# Patient Record
Sex: MALE | Race: WHITE | Employment: OTHER | URBAN - METROPOLITAN AREA
[De-identification: names, ages, dates, MRNs, and addresses within clinical notes are randomized per-mention and may not be internally consistent; named-entity substitution may affect disease eponyms.]

---

## 2017-01-31 ENCOUNTER — PROCEDURE VISIT (OUTPATIENT)
Dept: CARDIOLOGY CLINIC | Age: 57
End: 2017-01-31

## 2017-01-31 ENCOUNTER — OFFICE VISIT (OUTPATIENT)
Dept: CARDIOLOGY CLINIC | Age: 57
End: 2017-01-31

## 2017-01-31 VITALS
SYSTOLIC BLOOD PRESSURE: 98 MMHG | OXYGEN SATURATION: 97 % | WEIGHT: 192.5 LBS | DIASTOLIC BLOOD PRESSURE: 64 MMHG | HEIGHT: 72 IN | HEART RATE: 73 BPM | BODY MASS INDEX: 26.07 KG/M2

## 2017-01-31 DIAGNOSIS — I25.110 CORONARY ARTERY DISEASE INVOLVING NATIVE HEART WITH UNSTABLE ANGINA PECTORIS, UNSPECIFIED VESSEL OR LESION TYPE (HCC): Primary | ICD-10-CM

## 2017-01-31 DIAGNOSIS — E78.00 HYPERCHOLESTEREMIA: ICD-10-CM

## 2017-01-31 DIAGNOSIS — I10 ESSENTIAL HYPERTENSION: ICD-10-CM

## 2017-01-31 DIAGNOSIS — Z72.0 TOBACCO ABUSE: ICD-10-CM

## 2017-01-31 DIAGNOSIS — Z95.810 AUTOMATIC IMPLANTABLE CARDIOVERTER-DEFIBRILLATOR IN SITU: ICD-10-CM

## 2017-01-31 DIAGNOSIS — I25.5 ISCHEMIC CARDIOMYOPATHY: ICD-10-CM

## 2017-01-31 DIAGNOSIS — I42.8 OTHER PRIMARY CARDIOMYOPATHIES: ICD-10-CM

## 2017-01-31 DIAGNOSIS — I51.3 LEFT VENTRICULAR THROMBUS: ICD-10-CM

## 2017-01-31 PROCEDURE — 99214 OFFICE O/P EST MOD 30 MIN: CPT | Performed by: INTERNAL MEDICINE

## 2017-01-31 PROCEDURE — 93283 PRGRMG EVAL IMPLANTABLE DFB: CPT | Performed by: INTERNAL MEDICINE

## 2017-01-31 RX ORDER — CLOPIDOGREL BISULFATE 75 MG/1
75 TABLET ORAL DAILY
Qty: 90 TABLET | Refills: 3 | Status: SHIPPED | OUTPATIENT
Start: 2017-01-31 | End: 2018-03-02 | Stop reason: SDUPTHER

## 2017-01-31 RX ORDER — CARVEDILOL 12.5 MG/1
12.5 TABLET ORAL 2 TIMES DAILY WITH MEALS
Qty: 180 TABLET | Refills: 3 | Status: SHIPPED | OUTPATIENT
Start: 2017-01-31 | End: 2018-03-07 | Stop reason: SDUPTHER

## 2017-01-31 RX ORDER — ATORVASTATIN CALCIUM 80 MG/1
80 TABLET, FILM COATED ORAL DAILY
Qty: 90 TABLET | Refills: 3 | Status: SHIPPED | OUTPATIENT
Start: 2017-01-31 | End: 2018-03-02 | Stop reason: SDUPTHER

## 2017-01-31 RX ORDER — NICOTINE 21 MG/24HR
1 PATCH, TRANSDERMAL 24 HOURS TRANSDERMAL DAILY
Qty: 90 PATCH | Refills: 3 | Status: SHIPPED | OUTPATIENT
Start: 2017-01-31 | End: 2019-11-05

## 2017-04-29 PROBLEM — F14.10 COCAINE ABUSE (HCC): Status: ACTIVE | Noted: 2017-04-29

## 2017-04-29 PROBLEM — R07.9 CHEST PAIN: Status: ACTIVE | Noted: 2017-04-29

## 2017-06-15 ENCOUNTER — TELEPHONE (OUTPATIENT)
Dept: CARDIOLOGY CLINIC | Age: 57
End: 2017-06-15

## 2017-06-15 ASSESSMENT — ENCOUNTER SYMPTOMS: SHORTNESS OF BREATH: 0

## 2017-06-15 ASSESSMENT — COPD QUESTIONNAIRES: CAT_SEVERITY: MODERATE

## 2017-06-21 ENCOUNTER — TELEPHONE (OUTPATIENT)
Dept: CARDIOLOGY CLINIC | Age: 57
End: 2017-06-21

## 2017-06-22 ENCOUNTER — TELEPHONE (OUTPATIENT)
Dept: CARDIOLOGY CLINIC | Age: 57
End: 2017-06-22

## 2017-06-27 ENCOUNTER — HOSPITAL ENCOUNTER (OUTPATIENT)
Dept: ENDOSCOPY | Age: 57
Discharge: HOME | End: 2017-06-27
Attending: INTERNAL MEDICINE | Admitting: INTERNAL MEDICINE

## 2017-06-27 VITALS
HEART RATE: 55 BPM | DIASTOLIC BLOOD PRESSURE: 80 MMHG | SYSTOLIC BLOOD PRESSURE: 105 MMHG | OXYGEN SATURATION: 95 % | WEIGHT: 202.1 LBS | TEMPERATURE: 97.2 F | BODY MASS INDEX: 27.37 KG/M2 | RESPIRATION RATE: 13 BRPM | HEIGHT: 72 IN

## 2017-06-27 RX ORDER — PANTOPRAZOLE SODIUM 40 MG/1
40 TABLET, DELAYED RELEASE ORAL DAILY
Qty: 60 TABLET | Refills: 3 | Status: SHIPPED | OUTPATIENT
Start: 2017-06-27 | End: 2018-03-07 | Stop reason: SDUPTHER

## 2017-06-27 RX ORDER — SODIUM CHLORIDE 9 MG/ML
INJECTION, SOLUTION INTRAVENOUS CONTINUOUS
Status: DISCONTINUED | OUTPATIENT
Start: 2017-06-27 | End: 2017-06-28 | Stop reason: HOSPADM

## 2017-06-27 RX ADMIN — SODIUM CHLORIDE: 9 INJECTION, SOLUTION INTRAVENOUS at 08:03

## 2017-06-27 ASSESSMENT — PAIN - FUNCTIONAL ASSESSMENT: PAIN_FUNCTIONAL_ASSESSMENT: 0-10

## 2017-06-27 ASSESSMENT — ENCOUNTER SYMPTOMS: DYSPNEA ACTIVITY LEVEL: AFTER AMBULATING 2 FLIGHTS OF STAIRS

## 2017-09-07 ENCOUNTER — TELEPHONE (OUTPATIENT)
Dept: CARDIOLOGY CLINIC | Age: 57
End: 2017-09-07

## 2017-09-07 RX ORDER — SILDENAFIL 50 MG/1
50 TABLET, FILM COATED ORAL PRN
Qty: 10 TABLET | Refills: 0 | Status: SHIPPED | OUTPATIENT
Start: 2017-09-07 | End: 2018-03-07 | Stop reason: DRUGHIGH

## 2017-10-30 PROCEDURE — 93296 REM INTERROG EVL PM/IDS: CPT | Performed by: INTERNAL MEDICINE

## 2017-10-30 PROCEDURE — 93295 DEV INTERROG REMOTE 1/2/MLT: CPT | Performed by: INTERNAL MEDICINE

## 2017-10-31 ENCOUNTER — NURSE ONLY (OUTPATIENT)
Dept: CARDIOLOGY CLINIC | Age: 57
End: 2017-10-31

## 2017-10-31 DIAGNOSIS — I25.5 ISCHEMIC CARDIOMYOPATHY: ICD-10-CM

## 2017-10-31 DIAGNOSIS — Z95.810 AUTOMATIC IMPLANTABLE CARDIOVERTER-DEFIBRILLATOR IN SITU: ICD-10-CM

## 2017-10-31 NOTE — LETTER
[address] [place] [phone]  [address] [facility]    Pacemaker/Defibrillator Clinic          10/30/17        Mamta Rolle  [address] [address]        Dear Mamta Rolle    This letter is to inform you that we received the transmission from your monitor at home that checks your pacemaker and/or defibrillator, or implanted heart monitor. Everything is within normal limits. The next date your monitor will automatically transmit will be 2-6-18. Please do not send additional routine transmissions unless specifically requested. Your device and monitor are wireless and most transmit cellularly, but please periodically check your monitor is still plugged in to the electrical outlet. If you still use the telephone land line to send please ensure the connection to the phone estela is secure. This will help to ensure successful automatic transmissions in the future. Also, the monitor needs to be close to you while sleeping at night. Please be aware that the remote device transmission sites are periodically monitored only during regular business hours during which simultaneous in-office device clinics are being run. If your transmission requires attention, we will contact you as soon as possible. Thank you.             Candace

## 2018-03-02 ENCOUNTER — TELEPHONE (OUTPATIENT)
Dept: CARDIOLOGY CLINIC | Age: 58
End: 2018-03-02

## 2018-03-02 DIAGNOSIS — I10 ESSENTIAL HYPERTENSION: ICD-10-CM

## 2018-03-02 DIAGNOSIS — E78.00 HYPERCHOLESTEREMIA: ICD-10-CM

## 2018-03-02 DIAGNOSIS — I25.5 ISCHEMIC CARDIOMYOPATHY: ICD-10-CM

## 2018-03-02 DIAGNOSIS — I25.110 CORONARY ARTERY DISEASE INVOLVING NATIVE HEART WITH UNSTABLE ANGINA PECTORIS, UNSPECIFIED VESSEL OR LESION TYPE (HCC): ICD-10-CM

## 2018-03-02 DIAGNOSIS — Z72.0 TOBACCO ABUSE: ICD-10-CM

## 2018-03-02 RX ORDER — CLOPIDOGREL BISULFATE 75 MG/1
75 TABLET ORAL DAILY
Qty: 90 TABLET | Refills: 0 | Status: SHIPPED | OUTPATIENT
Start: 2018-03-02 | End: 2018-03-07 | Stop reason: SDUPTHER

## 2018-03-02 RX ORDER — ATORVASTATIN CALCIUM 80 MG/1
80 TABLET, FILM COATED ORAL DAILY
Qty: 90 TABLET | Refills: 0 | Status: SHIPPED | OUTPATIENT
Start: 2018-03-02 | End: 2018-03-07 | Stop reason: SDUPTHER

## 2018-03-07 ENCOUNTER — OFFICE VISIT (OUTPATIENT)
Dept: CARDIOLOGY CLINIC | Age: 58
End: 2018-03-07

## 2018-03-07 VITALS
WEIGHT: 209.3 LBS | DIASTOLIC BLOOD PRESSURE: 82 MMHG | SYSTOLIC BLOOD PRESSURE: 134 MMHG | HEIGHT: 72 IN | BODY MASS INDEX: 28.35 KG/M2 | HEART RATE: 74 BPM

## 2018-03-07 DIAGNOSIS — I10 ESSENTIAL HYPERTENSION: ICD-10-CM

## 2018-03-07 DIAGNOSIS — I25.5 ISCHEMIC CARDIOMYOPATHY: ICD-10-CM

## 2018-03-07 DIAGNOSIS — E78.00 HYPERCHOLESTEREMIA: ICD-10-CM

## 2018-03-07 DIAGNOSIS — Z72.0 TOBACCO ABUSE: ICD-10-CM

## 2018-03-07 DIAGNOSIS — I25.110 CORONARY ARTERY DISEASE INVOLVING NATIVE HEART WITH UNSTABLE ANGINA PECTORIS, UNSPECIFIED VESSEL OR LESION TYPE (HCC): Primary | ICD-10-CM

## 2018-03-07 PROCEDURE — 99214 OFFICE O/P EST MOD 30 MIN: CPT | Performed by: NURSE PRACTITIONER

## 2018-03-07 RX ORDER — SILDENAFIL 50 MG/1
50 TABLET, FILM COATED ORAL PRN
Qty: 10 TABLET | Refills: 0 | Status: CANCELLED | OUTPATIENT
Start: 2018-03-07

## 2018-03-07 RX ORDER — SILDENAFIL 100 MG/1
100 TABLET, FILM COATED ORAL PRN
Qty: 30 TABLET | Refills: 3 | Status: SHIPPED | OUTPATIENT
Start: 2018-03-07 | End: 2019-03-12 | Stop reason: SDUPTHER

## 2018-03-07 RX ORDER — ATORVASTATIN CALCIUM 80 MG/1
80 TABLET, FILM COATED ORAL DAILY
Qty: 90 TABLET | Refills: 0 | Status: SHIPPED | OUTPATIENT
Start: 2018-03-07 | End: 2018-12-07 | Stop reason: SDUPTHER

## 2018-03-07 RX ORDER — CLOPIDOGREL BISULFATE 75 MG/1
75 TABLET ORAL DAILY
Qty: 90 TABLET | Refills: 0 | Status: SHIPPED | OUTPATIENT
Start: 2018-03-07 | End: 2019-03-23 | Stop reason: SDUPTHER

## 2018-03-07 RX ORDER — ASPIRIN 81 MG/1
81 TABLET ORAL DAILY
Qty: 30 TABLET | Refills: 11 | Status: SHIPPED | OUTPATIENT
Start: 2018-03-07 | End: 2019-03-23 | Stop reason: SDUPTHER

## 2018-03-07 RX ORDER — CARVEDILOL 12.5 MG/1
12.5 TABLET ORAL 2 TIMES DAILY WITH MEALS
Qty: 180 TABLET | Refills: 3 | Status: SHIPPED | OUTPATIENT
Start: 2018-03-07 | End: 2019-07-05 | Stop reason: SDUPTHER

## 2018-03-07 RX ORDER — PANTOPRAZOLE SODIUM 40 MG/1
40 TABLET, DELAYED RELEASE ORAL DAILY
Qty: 60 TABLET | Refills: 3 | Status: SHIPPED | OUTPATIENT
Start: 2018-03-07 | End: 2019-03-23 | Stop reason: SDUPTHER

## 2018-04-04 ENCOUNTER — OFFICE VISIT (OUTPATIENT)
Dept: CARDIOLOGY CLINIC | Age: 58
End: 2018-04-04

## 2018-04-04 ENCOUNTER — PROCEDURE VISIT (OUTPATIENT)
Dept: CARDIOLOGY CLINIC | Age: 58
End: 2018-04-04

## 2018-04-04 VITALS
OXYGEN SATURATION: 97 % | WEIGHT: 210.9 LBS | HEART RATE: 79 BPM | DIASTOLIC BLOOD PRESSURE: 80 MMHG | SYSTOLIC BLOOD PRESSURE: 106 MMHG | BODY MASS INDEX: 28.56 KG/M2 | HEIGHT: 72 IN

## 2018-04-04 DIAGNOSIS — I25.5 ISCHEMIC CARDIOMYOPATHY: ICD-10-CM

## 2018-04-04 DIAGNOSIS — Z95.810 AUTOMATIC IMPLANTABLE CARDIOVERTER-DEFIBRILLATOR IN SITU: ICD-10-CM

## 2018-04-04 DIAGNOSIS — I25.110 CORONARY ARTERY DISEASE INVOLVING NATIVE HEART WITH UNSTABLE ANGINA PECTORIS, UNSPECIFIED VESSEL OR LESION TYPE (HCC): Primary | Chronic | ICD-10-CM

## 2018-04-04 DIAGNOSIS — I25.119 CORONARY ARTERY DISEASE WITH ANGINA PECTORIS, UNSPECIFIED VESSEL OR LESION TYPE, UNSPECIFIED WHETHER NATIVE OR TRANSPLANTED HEART (HCC): Chronic | ICD-10-CM

## 2018-04-04 PROCEDURE — 93283 PRGRMG EVAL IMPLANTABLE DFB: CPT | Performed by: INTERNAL MEDICINE

## 2018-04-04 PROCEDURE — 99214 OFFICE O/P EST MOD 30 MIN: CPT | Performed by: INTERNAL MEDICINE

## 2018-04-16 ENCOUNTER — TELEPHONE (OUTPATIENT)
Dept: CARDIOLOGY CLINIC | Age: 58
End: 2018-04-16

## 2018-07-10 ENCOUNTER — NURSE ONLY (OUTPATIENT)
Dept: CARDIOLOGY CLINIC | Age: 58
End: 2018-07-10

## 2018-07-10 DIAGNOSIS — I42.9 PRIMARY CARDIOMYOPATHY (HCC): ICD-10-CM

## 2018-07-10 DIAGNOSIS — Z95.810 AUTOMATIC IMPLANTABLE CARDIOVERTER-DEFIBRILLATOR IN SITU: ICD-10-CM

## 2018-07-10 PROCEDURE — 93296 REM INTERROG EVL PM/IDS: CPT | Performed by: INTERNAL MEDICINE

## 2018-07-10 PROCEDURE — 93295 DEV INTERROG REMOTE 1/2/MLT: CPT | Performed by: INTERNAL MEDICINE

## 2018-07-24 ENCOUNTER — HOSPITAL ENCOUNTER (OUTPATIENT)
Dept: NON INVASIVE DIAGNOSTICS | Age: 58
Discharge: HOME | End: 2018-07-24
Attending: INTERNAL MEDICINE | Admitting: INTERNAL MEDICINE

## 2018-07-24 DIAGNOSIS — R07.89 OTHER CHEST PAIN: ICD-10-CM

## 2018-07-24 LAB
LV EF: 28 %
LVEF MODALITY: NORMAL

## 2018-07-28 DIAGNOSIS — E78.00 HYPERCHOLESTEREMIA: ICD-10-CM

## 2018-07-28 DIAGNOSIS — Z72.0 TOBACCO ABUSE: ICD-10-CM

## 2018-07-28 DIAGNOSIS — I25.5 ISCHEMIC CARDIOMYOPATHY: ICD-10-CM

## 2018-07-28 DIAGNOSIS — I10 ESSENTIAL HYPERTENSION: ICD-10-CM

## 2018-07-28 DIAGNOSIS — I25.110 CORONARY ARTERY DISEASE INVOLVING NATIVE HEART WITH UNSTABLE ANGINA PECTORIS, UNSPECIFIED VESSEL OR LESION TYPE (HCC): ICD-10-CM

## 2018-07-31 ENCOUNTER — OFFICE VISIT (OUTPATIENT)
Dept: CARDIOLOGY CLINIC | Age: 58
End: 2018-07-31

## 2018-07-31 VITALS
DIASTOLIC BLOOD PRESSURE: 72 MMHG | WEIGHT: 213 LBS | HEART RATE: 72 BPM | SYSTOLIC BLOOD PRESSURE: 110 MMHG | BODY MASS INDEX: 28.89 KG/M2 | OXYGEN SATURATION: 96 %

## 2018-07-31 DIAGNOSIS — I10 ESSENTIAL HYPERTENSION: Chronic | ICD-10-CM

## 2018-07-31 DIAGNOSIS — I25.110 CORONARY ARTERY DISEASE INVOLVING NATIVE HEART WITH UNSTABLE ANGINA PECTORIS, UNSPECIFIED VESSEL OR LESION TYPE (HCC): Chronic | ICD-10-CM

## 2018-07-31 DIAGNOSIS — Z72.0 TOBACCO ABUSE: Chronic | ICD-10-CM

## 2018-07-31 DIAGNOSIS — I42.9 PRIMARY CARDIOMYOPATHY (HCC): Primary | ICD-10-CM

## 2018-07-31 DIAGNOSIS — E78.2 MIXED HYPERLIPIDEMIA: ICD-10-CM

## 2018-07-31 PROCEDURE — 99214 OFFICE O/P EST MOD 30 MIN: CPT | Performed by: NURSE PRACTITIONER

## 2018-07-31 RX ORDER — LISINOPRIL 2.5 MG/1
2.5 TABLET ORAL DAILY
Qty: 30 TABLET | Refills: 3 | Status: SHIPPED | OUTPATIENT
Start: 2018-07-31 | End: 2018-12-07 | Stop reason: SDUPTHER

## 2018-08-01 RX ORDER — ATORVASTATIN CALCIUM 80 MG/1
80 TABLET, FILM COATED ORAL DAILY
Qty: 90 TABLET | Refills: 3 | Status: SHIPPED | OUTPATIENT
Start: 2018-08-01 | End: 2018-12-06 | Stop reason: SDUPTHER

## 2018-08-01 RX ORDER — CLOPIDOGREL BISULFATE 75 MG/1
75 TABLET ORAL DAILY
Qty: 90 TABLET | Refills: 3 | Status: SHIPPED | OUTPATIENT
Start: 2018-08-01 | End: 2019-03-25 | Stop reason: ALTCHOICE

## 2018-11-01 ENCOUNTER — OFFICE VISIT (OUTPATIENT)
Dept: CARDIOLOGY CLINIC | Age: 58
End: 2018-11-01
Payer: COMMERCIAL

## 2018-11-01 VITALS
BODY MASS INDEX: 28.58 KG/M2 | WEIGHT: 211 LBS | HEART RATE: 68 BPM | SYSTOLIC BLOOD PRESSURE: 102 MMHG | HEIGHT: 72 IN | DIASTOLIC BLOOD PRESSURE: 60 MMHG

## 2018-11-01 DIAGNOSIS — I25.5 ISCHEMIC CARDIOMYOPATHY: ICD-10-CM

## 2018-11-01 DIAGNOSIS — E78.2 MIXED HYPERLIPIDEMIA: ICD-10-CM

## 2018-11-01 DIAGNOSIS — I25.10 CORONARY ARTERY DISEASE INVOLVING NATIVE CORONARY ARTERY OF NATIVE HEART WITHOUT ANGINA PECTORIS: Primary | Chronic | ICD-10-CM

## 2018-11-01 DIAGNOSIS — Z72.0 TOBACCO ABUSE: Chronic | ICD-10-CM

## 2018-11-01 DIAGNOSIS — I10 ESSENTIAL HYPERTENSION: Chronic | ICD-10-CM

## 2018-11-01 PROCEDURE — 99214 OFFICE O/P EST MOD 30 MIN: CPT | Performed by: NURSE PRACTITIONER

## 2018-11-01 RX ORDER — AMOXICILLIN AND CLAVULANATE POTASSIUM 875; 125 MG/1; MG/1
TABLET, FILM COATED ORAL
Refills: 0 | COMMUNITY
Start: 2018-10-28 | End: 2019-11-05

## 2018-11-01 NOTE — PROGRESS NOTES
fixed anterior and fixed inferior lateral defects  of severe intensity c/w scar. There is no ischemia. There is severe LV dysfunction with EF=28%. This is similar to study in 2013. Procedure: 11/14  [facility]  iFR RCA - 0.96        Coronary Findings   Vessel Ostial Proximal Mid Distal Special   LM 0% 0% 0% 0%     LAD 0% 0% 0% 0% D1-100% with L-L karey   RI   50% sup br         Cx 0% 0% 0% 0% Widely patent stent   RCA 0% 0% 40%, 60% 0% iFR negative      ECHO: 11/16  Left Ventricle   Normal left ventricular size. Mild concentric left ventricular hypertrophy.   Decreased left ventricular systolic function with mid to distal   inferolateral and apical hypokinesis. Estimated EF 40%. No evidence of an   apical clot. Definity confirmed. Assessment:   ~ Coronary artery disease/ cardiomyopathy     S/p CABG, AICD 4/13 11/16 4/13 CX stent patent, D1 100%, LIMA- OM patient but occluded to LAD, ramus severe, RCA 40-60%  Cx stent patent, D1 100%, LIMA-OM patent but occluded to LAD, ramus severe      Last Myoview stress test: 7/224/18: EF 28%, showed no ischemia     Last ECHO: 11/16: EF 40%, mild MR, TR     On    ASA, Plavix, Coreg, Lisinopril, and Lipitor    Patient appears stable on physical , No signs of CHF    Plan: continue current medications     ~ LV thrombus     Last ECHO: 11/16- showed no thrombus, on Plavix and ASA    ~ Hypertension     Today's B/P- 102/60     Stable, continue current medications    ~ Hyperlipidemia     Continue Lipitor 80 mg daily    6/28/18: HDL: 42, LDL: 91    ~ Tobacco    Smoking cessation was discussed at today's office visit    Patient is stable since last office visit. Plan:   1. Continue current medications  2. Lab slip for BMP- today   3. Smoking cessation was discussed at today's visit  4. Follow up in six months    I have addressed the patient's cardiac risk factors and adjusted pharmacologic treatment as needed.  In addition, I have reinforced the need for patient directed risk

## 2018-12-06 DIAGNOSIS — Z72.0 TOBACCO ABUSE: ICD-10-CM

## 2018-12-06 DIAGNOSIS — I25.110 CORONARY ARTERY DISEASE INVOLVING NATIVE HEART WITH UNSTABLE ANGINA PECTORIS, UNSPECIFIED VESSEL OR LESION TYPE (HCC): ICD-10-CM

## 2018-12-06 DIAGNOSIS — I10 ESSENTIAL HYPERTENSION: ICD-10-CM

## 2018-12-06 DIAGNOSIS — E78.00 HYPERCHOLESTEREMIA: ICD-10-CM

## 2018-12-06 DIAGNOSIS — I25.5 ISCHEMIC CARDIOMYOPATHY: ICD-10-CM

## 2018-12-07 RX ORDER — ATORVASTATIN CALCIUM 80 MG/1
80 TABLET, FILM COATED ORAL DAILY
Qty: 90 TABLET | Refills: 3 | Status: SHIPPED | OUTPATIENT
Start: 2018-12-07

## 2018-12-07 RX ORDER — LISINOPRIL 2.5 MG/1
2.5 TABLET ORAL DAILY
Qty: 90 TABLET | Refills: 3 | Status: SHIPPED | OUTPATIENT
Start: 2018-12-07 | End: 2019-11-05

## 2019-02-11 ENCOUNTER — HOSPITAL ENCOUNTER (OUTPATIENT)
Dept: GENERAL RADIOLOGY | Age: 59
Discharge: HOME | End: 2019-02-11
Payer: COMMERCIAL

## 2019-02-11 ENCOUNTER — HOSPITAL ENCOUNTER (OUTPATIENT)
Age: 59
Discharge: HOME | End: 2019-02-11
Payer: COMMERCIAL

## 2019-02-11 DIAGNOSIS — R52 PAIN: ICD-10-CM

## 2019-02-11 PROCEDURE — 73030 X-RAY EXAM OF SHOULDER: CPT

## 2019-03-12 ENCOUNTER — TELEPHONE (OUTPATIENT)
Dept: CARDIOLOGY CLINIC | Age: 59
End: 2019-03-12

## 2019-03-12 RX ORDER — SILDENAFIL 100 MG/1
100 TABLET, FILM COATED ORAL PRN
Qty: 30 TABLET | Refills: 3 | Status: ON HOLD | OUTPATIENT
Start: 2019-03-12 | End: 2020-01-09 | Stop reason: HOSPADM

## 2019-03-23 DIAGNOSIS — E78.00 HYPERCHOLESTEREMIA: ICD-10-CM

## 2019-03-23 DIAGNOSIS — I25.5 ISCHEMIC CARDIOMYOPATHY: ICD-10-CM

## 2019-03-23 DIAGNOSIS — I25.110 CORONARY ARTERY DISEASE INVOLVING NATIVE HEART WITH UNSTABLE ANGINA PECTORIS, UNSPECIFIED VESSEL OR LESION TYPE (HCC): ICD-10-CM

## 2019-03-23 DIAGNOSIS — Z72.0 TOBACCO ABUSE: ICD-10-CM

## 2019-03-23 DIAGNOSIS — I10 ESSENTIAL HYPERTENSION: ICD-10-CM

## 2019-03-25 RX ORDER — ASPIRIN 81 MG/1
TABLET, COATED ORAL
Qty: 30 TABLET | Refills: 0 | Status: SHIPPED | OUTPATIENT
Start: 2019-03-25

## 2019-03-25 RX ORDER — CLOPIDOGREL BISULFATE 75 MG/1
75 TABLET ORAL DAILY
Qty: 90 TABLET | Refills: 0 | Status: ON HOLD | OUTPATIENT
Start: 2019-03-25 | End: 2019-11-20 | Stop reason: ALTCHOICE

## 2019-03-25 RX ORDER — PANTOPRAZOLE SODIUM 40 MG/1
40 TABLET, DELAYED RELEASE ORAL DAILY
Qty: 60 TABLET | Refills: 0 | Status: SHIPPED | OUTPATIENT
Start: 2019-03-25 | End: 2019-06-06 | Stop reason: SDUPTHER

## 2019-06-07 RX ORDER — PANTOPRAZOLE SODIUM 40 MG/1
40 TABLET, DELAYED RELEASE ORAL DAILY
Qty: 60 TABLET | Refills: 0 | Status: SHIPPED | OUTPATIENT
Start: 2019-06-07

## 2019-07-05 DIAGNOSIS — I10 ESSENTIAL HYPERTENSION: ICD-10-CM

## 2019-07-05 DIAGNOSIS — I25.110 CORONARY ARTERY DISEASE INVOLVING NATIVE HEART WITH UNSTABLE ANGINA PECTORIS, UNSPECIFIED VESSEL OR LESION TYPE (HCC): ICD-10-CM

## 2019-07-05 DIAGNOSIS — Z72.0 TOBACCO ABUSE: ICD-10-CM

## 2019-07-05 DIAGNOSIS — I25.5 ISCHEMIC CARDIOMYOPATHY: ICD-10-CM

## 2019-07-05 DIAGNOSIS — E78.00 HYPERCHOLESTEREMIA: ICD-10-CM

## 2019-07-05 RX ORDER — CARVEDILOL 12.5 MG/1
TABLET ORAL
Qty: 180 TABLET | Refills: 0 | Status: ON HOLD | OUTPATIENT
Start: 2019-07-05 | End: 2020-01-21 | Stop reason: HOSPADM

## 2019-09-30 ENCOUNTER — TELEPHONE (OUTPATIENT)
Dept: CARDIOLOGY CLINIC | Age: 59
End: 2019-09-30

## 2019-11-05 RX ORDER — LANOLIN ALCOHOL/MO/W.PET/CERES
1000 CREAM (GRAM) TOPICAL DAILY
Status: ON HOLD | COMMUNITY
End: 2020-01-21 | Stop reason: HOSPADM

## 2019-11-05 RX ORDER — OMEPRAZOLE 20 MG/1
20 CAPSULE, DELAYED RELEASE ORAL NIGHTLY
Status: ON HOLD | COMMUNITY
End: 2020-01-03

## 2019-11-06 ENCOUNTER — ANESTHESIA EVENT (OUTPATIENT)
Dept: ENDOSCOPY | Age: 59
End: 2019-11-06
Payer: COMMERCIAL

## 2019-11-20 ENCOUNTER — ANESTHESIA (OUTPATIENT)
Dept: ENDOSCOPY | Age: 59
End: 2019-11-20
Payer: COMMERCIAL

## 2019-11-20 ENCOUNTER — HOSPITAL ENCOUNTER (OUTPATIENT)
Age: 59
Setting detail: OUTPATIENT SURGERY
Discharge: HOME | End: 2019-11-20
Attending: INTERNAL MEDICINE | Admitting: INTERNAL MEDICINE
Payer: COMMERCIAL

## 2019-11-20 VITALS
BODY MASS INDEX: 29.12 KG/M2 | HEIGHT: 72 IN | DIASTOLIC BLOOD PRESSURE: 82 MMHG | TEMPERATURE: 96.9 F | RESPIRATION RATE: 16 BRPM | OXYGEN SATURATION: 99 % | WEIGHT: 215 LBS | HEART RATE: 69 BPM | SYSTOLIC BLOOD PRESSURE: 132 MMHG

## 2019-11-20 VITALS — OXYGEN SATURATION: 100 % | DIASTOLIC BLOOD PRESSURE: 73 MMHG | SYSTOLIC BLOOD PRESSURE: 108 MMHG

## 2019-11-20 PROCEDURE — 2500000003 HC RX 250 WO HCPCS: Performed by: INTERNAL MEDICINE

## 2019-11-20 PROCEDURE — 3700000000 HC ANESTHESIA ATTENDED CARE: Performed by: INTERNAL MEDICINE

## 2019-11-20 PROCEDURE — 3700000001 HC ADD 15 MINUTES (ANESTHESIA): Performed by: INTERNAL MEDICINE

## 2019-11-20 PROCEDURE — 7100000011 HC PHASE II RECOVERY - ADDTL 15 MIN: Performed by: INTERNAL MEDICINE

## 2019-11-20 PROCEDURE — 6360000002 HC RX W HCPCS: Performed by: NURSE ANESTHETIST, CERTIFIED REGISTERED

## 2019-11-20 PROCEDURE — 6370000000 HC RX 637 (ALT 250 FOR IP): Performed by: INTERNAL MEDICINE

## 2019-11-20 PROCEDURE — 2500000003 HC RX 250 WO HCPCS: Performed by: NURSE ANESTHETIST, CERTIFIED REGISTERED

## 2019-11-20 PROCEDURE — 2709999900 HC NON-CHARGEABLE SUPPLY: Performed by: INTERNAL MEDICINE

## 2019-11-20 PROCEDURE — 2580000003 HC RX 258: Performed by: ANESTHESIOLOGY

## 2019-11-20 PROCEDURE — 3609010600 HC COLONOSCOPY POLYPECTOMY SNARE/COLD BIOPSY: Performed by: INTERNAL MEDICINE

## 2019-11-20 PROCEDURE — 7100000010 HC PHASE II RECOVERY - FIRST 15 MIN: Performed by: INTERNAL MEDICINE

## 2019-11-20 RX ORDER — PROPOFOL 10 MG/ML
INJECTION, EMULSION INTRAVENOUS CONTINUOUS PRN
Status: DISCONTINUED | OUTPATIENT
Start: 2019-11-20 | End: 2019-11-20 | Stop reason: SDUPTHER

## 2019-11-20 RX ORDER — PROPOFOL 10 MG/ML
INJECTION, EMULSION INTRAVENOUS PRN
Status: DISCONTINUED | OUTPATIENT
Start: 2019-11-20 | End: 2019-11-20 | Stop reason: SDUPTHER

## 2019-11-20 RX ORDER — SODIUM CHLORIDE 9 MG/ML
INJECTION, SOLUTION INTRAVENOUS CONTINUOUS
Status: DISCONTINUED | OUTPATIENT
Start: 2019-11-20 | End: 2019-11-20 | Stop reason: HOSPADM

## 2019-11-20 RX ORDER — LIDOCAINE HYDROCHLORIDE 20 MG/ML
INJECTION, SOLUTION INFILTRATION; PERINEURAL PRN
Status: DISCONTINUED | OUTPATIENT
Start: 2019-11-20 | End: 2019-11-20 | Stop reason: SDUPTHER

## 2019-11-20 RX ADMIN — PROPOFOL 40 MG: 10 INJECTION, EMULSION INTRAVENOUS at 11:32

## 2019-11-20 RX ADMIN — PROPOFOL 30 MG: 10 INJECTION, EMULSION INTRAVENOUS at 11:45

## 2019-11-20 RX ADMIN — LIDOCAINE HYDROCHLORIDE 100 MG: 20 INJECTION, SOLUTION INFILTRATION; PERINEURAL at 11:32

## 2019-11-20 RX ADMIN — PROPOFOL 20 MG: 10 INJECTION, EMULSION INTRAVENOUS at 11:38

## 2019-11-20 RX ADMIN — PROPOFOL 40 MG: 10 INJECTION, EMULSION INTRAVENOUS at 11:35

## 2019-11-20 RX ADMIN — PROPOFOL 140 MCG/KG/MIN: 10 INJECTION, EMULSION INTRAVENOUS at 11:32

## 2019-11-20 RX ADMIN — SODIUM CHLORIDE: 9 INJECTION, SOLUTION INTRAVENOUS at 11:17

## 2019-11-20 RX ADMIN — PROPOFOL 20 MG: 10 INJECTION, EMULSION INTRAVENOUS at 11:48

## 2019-11-20 ASSESSMENT — PAIN - FUNCTIONAL ASSESSMENT: PAIN_FUNCTIONAL_ASSESSMENT: 0-10

## 2019-11-20 ASSESSMENT — PAIN SCALES - GENERAL: PAINLEVEL_OUTOF10: 0

## 2019-11-20 NOTE — TELEPHONE ENCOUNTER
Medication Refill      PLEASE NOTE PT HAS APPT [facility]PK[facility] SCHEDULED FOR 3/7/18   THESE SCRIPTS WERE REFUSED ON 2/27/18 SO PT CALLED IN TO SCHED. When was your last appointment with cardiology?  (if 1year or longer, please schedule an appointment)    Medication needing refilled:clopidogrel (PLAVIX) 75 MG tablet AND  atorvastatin (LIPITOR) 80 MG tablet      Doseage of the medication    How are you taking this medication (QD, BID, TID, QID, PRN):    Patient want a 30 or 90 day supply called in:30    Which Pharmacy are we sending the medication [facility]: [facility] [address] [facility] [address]    Pharmacy Phone number:    Pharmacy Fax number: PAST SURGICAL HISTORY:  Cataract extraction status of eye, left     H/O knee surgery torn minsicus repair b/l knee    H/O:

## 2020-01-03 ENCOUNTER — APPOINTMENT (OUTPATIENT)
Dept: CT IMAGING | Age: 60
DRG: 065 | End: 2020-01-03
Payer: COMMERCIAL

## 2020-01-03 ENCOUNTER — APPOINTMENT (OUTPATIENT)
Dept: GENERAL RADIOLOGY | Age: 60
DRG: 065 | End: 2020-01-03
Payer: COMMERCIAL

## 2020-01-03 ENCOUNTER — HOSPITAL ENCOUNTER (INPATIENT)
Age: 60
LOS: 6 days | Discharge: NURSING, REHAB OR LONG-TERM CARE | DRG: 065 | End: 2020-01-09
Attending: EMERGENCY MEDICINE | Admitting: INTERNAL MEDICINE
Payer: COMMERCIAL

## 2020-01-03 PROBLEM — I63.9 ACUTE CEREBROVASCULAR ACCIDENT (CVA) (HCC): Status: ACTIVE | Noted: 2020-01-03

## 2020-01-03 LAB
A/G RATIO: 1.1 (ref 1.1–2.2)
ALBUMIN SERPL-MCNC: 3.8 G/DL (ref 3.4–5)
ALP BLD-CCNC: 82 U/L (ref 40–129)
ALT SERPL-CCNC: 14 U/L (ref 10–40)
ANION GAP SERPL CALCULATED.3IONS-SCNC: 12 MMOL/L (ref 3–16)
AST SERPL-CCNC: 16 U/L (ref 15–37)
BASOPHILS ABSOLUTE: 0.1 K/UL (ref 0–0.2)
BASOPHILS RELATIVE PERCENT: 0.8 %
BILIRUB SERPL-MCNC: 0.7 MG/DL (ref 0–1)
BUN BLDV-MCNC: 17 MG/DL (ref 7–20)
CALCIUM SERPL-MCNC: 9.6 MG/DL (ref 8.3–10.6)
CHLORIDE BLD-SCNC: 102 MMOL/L (ref 99–110)
CO2: 28 MMOL/L (ref 21–32)
CREAT SERPL-MCNC: 1.2 MG/DL (ref 0.8–1.3)
EOSINOPHILS ABSOLUTE: 0.1 K/UL (ref 0–0.6)
EOSINOPHILS RELATIVE PERCENT: 0.7 %
GFR AFRICAN AMERICAN: >60
GFR NON-AFRICAN AMERICAN: >60
GLOBULIN: 3.6 G/DL
GLUCOSE BLD-MCNC: 106 MG/DL (ref 70–99)
GLUCOSE BLD-MCNC: 118 MG/DL (ref 70–99)
HCT VFR BLD CALC: 46.5 % (ref 40.5–52.5)
HEMOGLOBIN: 15.2 G/DL (ref 13.5–17.5)
INR BLD: 0.92 (ref 0.86–1.14)
LYMPHOCYTES ABSOLUTE: 1.6 K/UL (ref 1–5.1)
LYMPHOCYTES RELATIVE PERCENT: 13.3 %
MCH RBC QN AUTO: 30.9 PG (ref 26–34)
MCHC RBC AUTO-ENTMCNC: 32.6 G/DL (ref 31–36)
MCV RBC AUTO: 95 FL (ref 80–100)
MONOCYTES ABSOLUTE: 0.8 K/UL (ref 0–1.3)
MONOCYTES RELATIVE PERCENT: 6.5 %
NEUTROPHILS ABSOLUTE: 9.2 K/UL (ref 1.7–7.7)
NEUTROPHILS RELATIVE PERCENT: 78.7 %
PDW BLD-RTO: 14 % (ref 12.4–15.4)
PERFORMED ON: ABNORMAL
PLATELET # BLD: 230 K/UL (ref 135–450)
PMV BLD AUTO: 9 FL (ref 5–10.5)
POTASSIUM REFLEX MAGNESIUM: 4.5 MMOL/L (ref 3.5–5.1)
PROTHROMBIN TIME: 10.7 SEC (ref 10–13.2)
RBC # BLD: 4.9 M/UL (ref 4.2–5.9)
REASON FOR REJECTION: NORMAL
REJECTED TEST: NORMAL
SODIUM BLD-SCNC: 142 MMOL/L (ref 136–145)
TOTAL PROTEIN: 7.4 G/DL (ref 6.4–8.2)
TROPONIN: <0.01 NG/ML
WBC # BLD: 11.7 K/UL (ref 4–11)

## 2020-01-03 PROCEDURE — 84484 ASSAY OF TROPONIN QUANT: CPT

## 2020-01-03 PROCEDURE — 36415 COLL VENOUS BLD VENIPUNCTURE: CPT

## 2020-01-03 PROCEDURE — 70450 CT HEAD/BRAIN W/O DYE: CPT

## 2020-01-03 PROCEDURE — 80053 COMPREHEN METABOLIC PANEL: CPT

## 2020-01-03 PROCEDURE — 2580000003 HC RX 258: Performed by: INTERNAL MEDICINE

## 2020-01-03 PROCEDURE — 6360000004 HC RX CONTRAST MEDICATION: Performed by: EMERGENCY MEDICINE

## 2020-01-03 PROCEDURE — 96361 HYDRATE IV INFUSION ADD-ON: CPT

## 2020-01-03 PROCEDURE — 70498 CT ANGIOGRAPHY NECK: CPT

## 2020-01-03 PROCEDURE — 85610 PROTHROMBIN TIME: CPT

## 2020-01-03 PROCEDURE — 96360 HYDRATION IV INFUSION INIT: CPT

## 2020-01-03 PROCEDURE — 99285 EMERGENCY DEPT VISIT HI MDM: CPT

## 2020-01-03 PROCEDURE — 85025 COMPLETE CBC W/AUTO DIFF WBC: CPT

## 2020-01-03 PROCEDURE — 2060000000 HC ICU INTERMEDIATE R&B

## 2020-01-03 PROCEDURE — 6370000000 HC RX 637 (ALT 250 FOR IP): Performed by: EMERGENCY MEDICINE

## 2020-01-03 PROCEDURE — 2580000003 HC RX 258: Performed by: EMERGENCY MEDICINE

## 2020-01-03 PROCEDURE — 71045 X-RAY EXAM CHEST 1 VIEW: CPT

## 2020-01-03 PROCEDURE — 93005 ELECTROCARDIOGRAM TRACING: CPT | Performed by: EMERGENCY MEDICINE

## 2020-01-03 RX ORDER — SODIUM CHLORIDE 0.9 % (FLUSH) 0.9 %
10 SYRINGE (ML) INJECTION EVERY 12 HOURS SCHEDULED
Status: DISCONTINUED | OUTPATIENT
Start: 2020-01-03 | End: 2020-01-09 | Stop reason: HOSPADM

## 2020-01-03 RX ORDER — ASPIRIN 300 MG/1
600 SUPPOSITORY RECTAL ONCE
Status: COMPLETED | OUTPATIENT
Start: 2020-01-03 | End: 2020-01-03

## 2020-01-03 RX ORDER — CARVEDILOL 6.25 MG/1
12.5 TABLET ORAL 2 TIMES DAILY
Status: DISCONTINUED | OUTPATIENT
Start: 2020-01-03 | End: 2020-01-09 | Stop reason: HOSPADM

## 2020-01-03 RX ORDER — ASPIRIN 300 MG/1
300 SUPPOSITORY RECTAL DAILY
Status: DISCONTINUED | OUTPATIENT
Start: 2020-01-04 | End: 2020-01-09 | Stop reason: HOSPADM

## 2020-01-03 RX ORDER — ONDANSETRON 2 MG/ML
4 INJECTION INTRAMUSCULAR; INTRAVENOUS EVERY 6 HOURS PRN
Status: DISCONTINUED | OUTPATIENT
Start: 2020-01-03 | End: 2020-01-09 | Stop reason: HOSPADM

## 2020-01-03 RX ORDER — CLOPIDOGREL BISULFATE 75 MG/1
75 TABLET ORAL DAILY
Status: DISCONTINUED | OUTPATIENT
Start: 2020-01-04 | End: 2020-01-09 | Stop reason: HOSPADM

## 2020-01-03 RX ORDER — ATORVASTATIN CALCIUM 80 MG/1
80 TABLET, FILM COATED ORAL DAILY
Status: DISCONTINUED | OUTPATIENT
Start: 2020-01-04 | End: 2020-01-09 | Stop reason: HOSPADM

## 2020-01-03 RX ORDER — ASPIRIN 81 MG/1
81 TABLET ORAL DAILY
Status: DISCONTINUED | OUTPATIENT
Start: 2020-01-04 | End: 2020-01-03 | Stop reason: SDUPTHER

## 2020-01-03 RX ORDER — SODIUM CHLORIDE 0.9 % (FLUSH) 0.9 %
10 SYRINGE (ML) INJECTION PRN
Status: DISCONTINUED | OUTPATIENT
Start: 2020-01-03 | End: 2020-01-09 | Stop reason: HOSPADM

## 2020-01-03 RX ORDER — ASPIRIN 81 MG/1
81 TABLET ORAL DAILY
Status: DISCONTINUED | OUTPATIENT
Start: 2020-01-04 | End: 2020-01-09 | Stop reason: HOSPADM

## 2020-01-03 RX ORDER — PANTOPRAZOLE SODIUM 40 MG/1
40 TABLET, DELAYED RELEASE ORAL DAILY
Status: DISCONTINUED | OUTPATIENT
Start: 2020-01-04 | End: 2020-01-09 | Stop reason: HOSPADM

## 2020-01-03 RX ORDER — 0.9 % SODIUM CHLORIDE 0.9 %
1000 INTRAVENOUS SOLUTION INTRAVENOUS ONCE
Status: COMPLETED | OUTPATIENT
Start: 2020-01-03 | End: 2020-01-03

## 2020-01-03 RX ADMIN — Medication 10 ML: at 23:15

## 2020-01-03 RX ADMIN — SODIUM CHLORIDE 1000 ML: 9 INJECTION, SOLUTION INTRAVENOUS at 19:08

## 2020-01-03 RX ADMIN — IOPAMIDOL 75 ML: 755 INJECTION, SOLUTION INTRAVENOUS at 19:34

## 2020-01-03 RX ADMIN — ASPIRIN 600 MG: 300 SUPPOSITORY RECTAL at 19:08

## 2020-01-03 ASSESSMENT — PAIN SCALES - GENERAL: PAINLEVEL_OUTOF10: 0

## 2020-01-03 NOTE — ED PROVIDER NOTES
[address] [facility]        Pt Name: Sang To  MRN: [id number]  [place] 1960  Date of evaluation: 1/3/2020  Provider: Ilsa Parish MD  PCP: Pennie Umana MD    This patient was seen and evaluated by the attending physician Ilsa Parish MD.      [address] [place] [address]       Chief Complaint   Patient presents with    Altered Mental Status     Pt arrived via EMS from home for c/c of AMS and R sided weakness, last known well time yesterday at 1800, pt alert x3, making chewing motions with his mouth, pt denies pain, reports hx of previous stroke and MI       HISTORY OF PRESENT ILLNESS   (Location/Symptom, Timing/Onset, Context/Setting, Quality, Duration, Modifying Factors, Severity)  Note limiting factors. Sang To is a 62 y.o. male BIB EMS for right sided CVA. IN short, he's Awake and alert to name and location, year. Date is a bit hard for him, but he knows January. Last known well sounds like 24hrs ago+. Right sided weakness with 2/5 strength to RUE and RLE, some right sided neglect. Nursing Notes were all reviewed and agreed with or any disagreements were addressed  in the HPI. REVIEW OF SYSTEMS    (2-9 systems for level 4, 10 or more for level 5)     Review of Systems    Positives and Pertinent negatives as per HPI. Except as noted abovein the ROS, all other systems were reviewed and negative.        PAST MEDICAL HISTORY     Past Medical History:   Diagnosis Date    CAD (coronary artery disease)     MI CABG x5    Chronic kidney disease     Collapsed lung     Bilat    GERD (gastroesophageal reflux disease)     Hematuria     Hyperlipidemia     Hypertension     ICD (implantable cardioverter-defibrillator) in place     Mural thrombus of left ventricle     Tortuous aorta ([address]          SURGICAL HISTORY     Past Surgical History:   Procedure Laterality Date    ARM DEBRIDEMENT  10-26-10    incision tobacco: Never Used    Tobacco comment: trying to quit/currently < 1PPD   Substance and Sexual Activity    Alcohol use: No    Drug use: Yes     Types: Cocaine     Comment: 10 YEARS AGO    Sexual activity: None   Lifestyle    Physical activity:     Days per week: None     Minutes per session: None    Stress: None   Relationships    Social connections:     Talks on phone: None     Gets together: None     Attends Religion service: None     Active member of club or organization: None     Attends meetings of clubs or organizations: None     Relationship status: None    Intimate partner violence:     Fear of current or ex partner: None     Emotionally abused: None     Physically abused: None     Forced sexual activity: None   Other Topics Concern    None   Social History Narrative    None       SCREENINGS   NIH Stroke Scale  Interval: Baseline  Level of Consciousness (1a. ): Alert  LOC Questions (1b. ): Answers both correctly  LOC Commands (1c. ): Performs both tasks correctly  Best Gaze (2. ): Normal  Visual (3. ): No visual loss  Facial Palsy (4. ): (!) Minor paralysis  Motor Arm, Left (5a. ): No drift  Motor Arm, Right (5b. ): Some effort against gravity  Motor Leg, Left (6a. ): No drift  Motor Leg, Right (6b. ): Drift, but does not hit bed  Limb Ataxia (7. ): (!) Present in one limb  Sensory (8. ): (!) Mild to Moderate  Best Language (9. ): Mild to moderate aphasia  Dysarthria (10. ): Mild to moderate dysarthria  Extinction and Inattention (11): No abnormality  Total: 8         PHYSICAL EXAM    (up to 7 for level 4, 8 or more for level 5)     ED Triage Vitals   BP Temp Temp src Pulse Resp SpO2 Height Weight   -- -- -- -- -- -- -- --       Physical Exam     General Appearance:  Alert, cooperative, no distress, appears stated age. Head:  Normocephalic, without obvious abnormality, atraumatic. Eyes:  conjunctiva/corneas clear, EOM's intact. Sclera anicteric.    ENT: Mucous membranes moist.   Neck: Supple, interpretation of EKG. RADIOLOGY:   Non-plain film images such as CT, Ultrasound and MRI are read by the radiologist. Plain radiographic images are visualized andpreliminarily interpreted by the  ED Provider with the below findings:        Interpretation perthe Radiologist below, if available at the time of this note:    XR CHEST PORTABLE   Final Result   No acute abnormality identified. CT HEAD WO CONTRAST   Preliminary Result   1. Low-attenuation within the left thalamus as well as left posteromedial   temporal lobe compatible with acute/subacute PCA territory infarctions. No   associated hemorrhage. 2. Right frontal lobe encephalomalacia compatible with old right MCA   territory infarction. Small area of encephalomalacia in the right cerebellum   compatible with old infarction. 3. Parenchymal volume loss and sequela of chronic microvascular ischemic   changes. Critical results were called by Dr. Keisha Rios MD to [facility] on 1/3/2020 at 18:40. CTA HEAD NECK W CONTRAST    (Results Pending)     No results found.         PROCEDURES   Unless otherwise noted below, none     Procedures    CRITICAL CARE TIME   N/A    CONSULTS:  IP CONSULT TO PHARMACY  IP CONSULT TO HOSPITALIST      EMERGENCY DEPARTMENT COURSE and DIFFERENTIAL DIAGNOSIS/MDM:   Vitals:    Vitals:    01/03/20 1815 01/03/20 1845 01/03/20 1900   BP: (!) 158/100 (!) 164/97 (!) 164/103   Pulse: 77 71 79   Resp: 20 19 13   Temp: 98.3 °F (36.8 °C)     TempSrc: Oral     SpO2: 99% 98% 100%   Weight: 210 lb (95.3 kg)     Height: 6' (1.829 m)         Patient was given thefollowing medications:  Medications   0.9 % sodium chloride bolus (1,000 mLs Intravenous New Bag 1/3/20 1908)   aspirin suppository 600 mg (600 mg Rectal Given 1/3/20 1908)   iopamidol (ISOVUE-370) 76 % injection 75 mL (75 mLs Intravenous Given 1/3/20 1934)       I ordered the stroke alert to expedite  Not a TPA candidate due to time of onset (24hrs +).  PPM; can't MRI presently. Getting CT/CTA  Will admit. Called PCP Dr. Patton Phoenix. Venous Access Procedure Note  Indication: nursing staff unable to obtain access    Procedure: The patient was placed in the appropriate position and the skin over the puncture site was prepped with betadine and draped in a sterile fashion. Intravenous access was obtained in Right Brachial Vein and the site was secured appropriately. US guidance used. The patient tolerated the procedure well. Complications: None            FINAL IMPRESSION      1. Cerebrovascular accident (CVA), unspecified mechanism ([facility]          DISPOSITION/PLAN   DISPOSITION Decision To Admit 01/03/2020 07:43:02 PM      PATIENT REFERREDTO:  No follow-up provider specified.     DISCHARGE MEDICATIONS:  New Prescriptions    No medications on file       DISCONTINUED MEDICATIONS:  Discontinued Medications    No medications on file              (Please note that portions ofthis note were completed with a voice recognition program.  Efforts were made to edit the dictations but occasionally words are mis-transcribed.)    Braxton Sharma MD (electronically signed)           Braxton Sharma MD  01/03/20 2010

## 2020-01-03 NOTE — ED NOTES
Bed: 01  Expected date:   Expected time:   Means of arrival:   Comments:  MEDIC 212 December AG Gomez  01/03/20 8995

## 2020-01-04 LAB
AMPHETAMINE SCREEN, URINE: POSITIVE
BARBITURATE SCREEN URINE: ABNORMAL
BENZODIAZEPINE SCREEN, URINE: ABNORMAL
CANNABINOID SCREEN URINE: ABNORMAL
CHOLESTEROL, TOTAL: 225 MG/DL (ref 0–199)
COCAINE METABOLITE SCREEN URINE: ABNORMAL
EKG ATRIAL RATE: 73 BPM
EKG DIAGNOSIS: NORMAL
EKG P AXIS: 26 DEGREES
EKG P-R INTERVAL: 118 MS
EKG Q-T INTERVAL: 388 MS
EKG QRS DURATION: 98 MS
EKG QTC CALCULATION (BAZETT): 427 MS
EKG R AXIS: -14 DEGREES
EKG T AXIS: 131 DEGREES
EKG VENTRICULAR RATE: 73 BPM
HCT VFR BLD CALC: 44.9 % (ref 40.5–52.5)
HDLC SERPL-MCNC: 39 MG/DL (ref 40–60)
HEMOGLOBIN: 14.9 G/DL (ref 13.5–17.5)
LDL CHOLESTEROL CALCULATED: 152 MG/DL
Lab: ABNORMAL
MCH RBC QN AUTO: 31.3 PG (ref 26–34)
MCHC RBC AUTO-ENTMCNC: 33.2 G/DL (ref 31–36)
MCV RBC AUTO: 94.3 FL (ref 80–100)
METHADONE SCREEN, URINE: ABNORMAL
OPIATE SCREEN URINE: ABNORMAL
OXYCODONE URINE: ABNORMAL
PDW BLD-RTO: 14.1 % (ref 12.4–15.4)
PH UA: 6
PHENCYCLIDINE SCREEN URINE: ABNORMAL
PLATELET # BLD: 205 K/UL (ref 135–450)
PMV BLD AUTO: 8.6 FL (ref 5–10.5)
PROPOXYPHENE SCREEN: ABNORMAL
RBC # BLD: 4.76 M/UL (ref 4.2–5.9)
TRIGL SERPL-MCNC: 170 MG/DL (ref 0–150)
VLDLC SERPL CALC-MCNC: 34 MG/DL
WBC # BLD: 8.3 K/UL (ref 4–11)

## 2020-01-04 PROCEDURE — 93010 ELECTROCARDIOGRAM REPORT: CPT | Performed by: INTERNAL MEDICINE

## 2020-01-04 PROCEDURE — 94760 N-INVAS EAR/PLS OXIMETRY 1: CPT

## 2020-01-04 PROCEDURE — 99255 IP/OBS CONSLTJ NEW/EST HI 80: CPT | Performed by: PSYCHIATRY & NEUROLOGY

## 2020-01-04 PROCEDURE — 85027 COMPLETE CBC AUTOMATED: CPT

## 2020-01-04 PROCEDURE — 92523 SPEECH SOUND LANG COMPREHEN: CPT

## 2020-01-04 PROCEDURE — 80061 LIPID PANEL: CPT

## 2020-01-04 PROCEDURE — 2580000003 HC RX 258: Performed by: INTERNAL MEDICINE

## 2020-01-04 PROCEDURE — 2060000000 HC ICU INTERMEDIATE R&B

## 2020-01-04 PROCEDURE — 6360000002 HC RX W HCPCS: Performed by: INTERNAL MEDICINE

## 2020-01-04 PROCEDURE — 6370000000 HC RX 637 (ALT 250 FOR IP): Performed by: INTERNAL MEDICINE

## 2020-01-04 PROCEDURE — 92610 EVALUATE SWALLOWING FUNCTION: CPT

## 2020-01-04 PROCEDURE — 80307 DRUG TEST PRSMV CHEM ANLYZR: CPT

## 2020-01-04 PROCEDURE — 36415 COLL VENOUS BLD VENIPUNCTURE: CPT

## 2020-01-04 PROCEDURE — 92526 ORAL FUNCTION THERAPY: CPT

## 2020-01-04 RX ADMIN — PANTOPRAZOLE SODIUM 40 MG: 40 TABLET, DELAYED RELEASE ORAL at 08:43

## 2020-01-04 RX ADMIN — CARVEDILOL 12.5 MG: 6.25 TABLET, FILM COATED ORAL at 08:43

## 2020-01-04 RX ADMIN — Medication 10 ML: at 08:43

## 2020-01-04 RX ADMIN — CLOPIDOGREL 75 MG: 75 TABLET, FILM COATED ORAL at 08:43

## 2020-01-04 RX ADMIN — ENOXAPARIN SODIUM 40 MG: 40 INJECTION SUBCUTANEOUS at 08:44

## 2020-01-04 RX ADMIN — Medication 10 ML: at 22:52

## 2020-01-04 RX ADMIN — ATORVASTATIN CALCIUM 80 MG: 80 TABLET, FILM COATED ORAL at 08:43

## 2020-01-04 RX ADMIN — ASPIRIN 81 MG: 81 TABLET, COATED ORAL at 08:43

## 2020-01-04 ASSESSMENT — PAIN SCALES - GENERAL
PAINLEVEL_OUTOF10: 0

## 2020-01-04 NOTE — PROGRESS NOTES
Ice pack applied to right upper arm and raised with 2x pillows for prior infiltrated IV with IV contrast. Right upper arm remains swollen, no redness to site noted. Ulnar and brachial pulses remain palpable. Scar along radial site (prior hx of CABG).

## 2020-01-04 NOTE — PROGRESS NOTES
Bed pad alarm placed on bed and hooked into nurse call light system. Camera to be placed in room d/t pt impulsiveness.

## 2020-01-04 NOTE — CONSULTS
Caron Kay MD        ondansetron [facility]) injection 4 mg  4 mg Intravenous Q6H PRN Caron Kay MD        enoxaparin (LOVENOX) injection 40 mg  40 mg Subcutaneous Daily Caron Kay MD   40 mg at 01/04/20 0844    aspirin EC tablet 81 mg  81 mg Oral Daily Caron Kay MD   81 mg at 01/04/20 2428    Or    aspirin suppository 300 mg  300 mg Rectal Daily Caron Kay MD        clopidogrel (PLAVIX) tablet 75 mg  75 mg Oral Daily Caron Kay MD   75 mg at 01/04/20 0843     No Known Allergies    PHYSICAL EXAMINATION:  /82   Pulse 78   Temp 97.8 °F (36.6 °C) (Oral)   Resp 16   Ht 6' (1.829 m)   Wt 205 lb 6.4 oz (93.2 kg)   SpO2 95%   BMI 27.86 kg/m²   Appearance: Well appearing, well nourished and in no distress  Mental Status Exam: Patient is awake and alert. He is oriented x2. Recent memory slightly impaired. Speech is dysarthric. No aphasia. Fund of knowledge is reasonable. Judgment is normal.  Funduscopic Exam: Could not cooperate for exam  Cranial Nerves:   II: Visual fields: Right homonymous hemianopsia  III: Pupils: Equal round reactive to light  III,IV,VI: Extraocular movements are intact  V: Facial sensation: Normal bilaterally  VII: Mild right facial droop  VIII: Hearing: Normal for conversation  IX: Palate moves symmetrically  XI: Shoulder strength is diminished on the right side  XII: Tongue is in the midline  Motor: Strength is 2/5 on the right side and 4+5 on the left side. Tone is diminished on the right side.   Sensory: Normal for light touch in the arms and legs  Coordination:    Impaired on the right side and normal on the left side           Reflexes: 1 and symmetrical bilaterally  Plantar response: Extensor on the right and flexor on the left  Gait: Unable to ambulate at this time  Romberg: Could not be tested  Vascular: No carotid bruit bilaterally        DATA:  LABS:  General Labs:    CBC:   Lab Results   Component Value

## 2020-01-04 NOTE — PROGRESS NOTES
Patient is admitted to room [phone] from the emergency room. Patient has arrived to the floor at this time via stretcher and ambulated/transfered to bed. Oriented to room. Call light and bedside table within reach. Patient rates a high fall risk. Denies further needs at this time. Will continue to monitor.  [facility]

## 2020-01-04 NOTE — PLAN OF CARE
Received a page from the ED regarding Mr. Lomas. 61 yo M who was LKW at 18:00 on 01/02/2020. Presented with R hemiparesis. Personally reviewed and independently interpreted imaging in the [address] [place] [address] stroke fidelia and discussed case with vascular team. L P2 occlusion. Evidence of completed infarct within the L PCA distribution.  Previous R frontal infarct.  - No role for mechanical thrombectomy given the time of presentation from [facility] being >24 hrs, evidence of completed infarct on [facility], and the location of the occlusion  - Recommend admission to the hospital  - Recommend neurology consult for stroke etiology work up and secondary stroke prevention

## 2020-01-04 NOTE — PLAN OF CARE
Problem: SAFETY  Goal: Free from accidental physical injury  Intervention: ASSESS FOR FALL RISK  Note:   Patient is a high fall risk. Patient free of falls this shift. Bed low, locked and alarmed at all times. Call light and bedside table is within reach. Yellow blanket on bed, arm band on wrist and fall sign posted in the room. Notified patient to ask for assistance when needed. Pt has been impulsive with attempts at getting OOB but Patient verbalized understanding at this time. Problem: PAIN  Goal: Patient's pain/discomfort is manageable  Intervention: ASSESS PAIN LEVEL  Note:   No pain noted upon assessment. Problem: Falls - Risk of: Intervention: Toileting assistance  Note:   Assistance to be provided as needed in the form of urinal/bedpan at this time. Problem: HEMODYNAMIC STATUS  Goal: Patient has stable vital signs and fluid balance  Intervention: Blood pressure monitoring  Note:   VSS upon assessment.

## 2020-01-04 NOTE — H&P
Department of Internal Medicine  History & Physical      SUBJECTIVE: The patient is a 62year old white male with history of CAD, cardiomyopathy,hypercholesterolemia, meth and cocaine abuse and non compliance with medicine, the patient was found home by his step dad on the floor with right sided paralysis, unknown the timing , was not candidate for TPA, , CT consistent with CVA, denies any c/o    ALLERGIES: No Known Allergies   MEDICATIONS:   Prior to Admission medications    Medication Sig Start Date End Date Taking?  Authorizing Provider   vitamin B-12 (CYANOCOBALAMIN) 1000 MCG tablet Take 1,000 mcg by mouth daily   Yes Historical Provider, MD   carvedilol (COREG) 12.5 MG tablet TAKE 1 TABLET BY MOUTH TWICE DAILY WITH MEALS 7/5/19  Yes Sofia Thayer MD   pantoprazole (Gregory [address] 26) 40 MG tablet TAKE 1 TABLET BY MOUTH DAILY 6/7/19  Yes Sofia Thayer MD   ASPIRIN LOW DOSE 81 MG EC tablet TAKE 1 TABLET BY MOUTH DAILY 3/25/19  Yes RAFAEL Pappas CNP   sildenafil (VIAGRA) 100 MG tablet Take 1 tablet by mouth as needed for Erectile Dysfunction 3/12/19  Yes RAFAEL Pappas CNP   atorvastatin (LIPITOR) 80 MG tablet Take 1 tablet by mouth daily 12/7/18  Yes RAFAEL Pappas CNP     PMH   Past Medical History:   Diagnosis Date    CAD (coronary artery disease)     MI CABG x5    Chronic kidney disease     Collapsed lung     Bilat    GERD (gastroesophageal reflux disease)     Hematuria     Hyperlipidemia     Hypertension     ICD (implantable cardioverter-defibrillator) in place     Mural thrombus of left ventricle     Tortuous aorta (HCC)       PSH:       Procedure Laterality Date    ARM DEBRIDEMENT  10-26-10    incision and drainage bilateral arms    CARDIAC SURGERY      three stents    CHOLECYSTECTOMY, OPEN      COLON SURGERY      COLONOSCOPY  07/18/2016    COLONOSCOPY  11/20/2019    COLONOSCOPY POLYPECTOMY SNARE/COLD BIOPSY performed by Shellie Sahu MD at [address] [place] [address] apparent distress and appears older than stated age  HEAD:  Normocephalic, atraumatic  HEENT:  ENT exam normal, no neck nodes or sinus tenderness  EYE: conjunctivae/corneas clear. PERRL, EOM's intact. Fundi benign. NECK:  Supple, symmetrical, trachea midline, no adenopathy, thyroid symmetric, not enlarged and no tenderness, skin normal  LUNGS:  no increased work of breathing, decreased air exchange and crackles right base and left base  CARDIOVASCULAR:  regular rate and rhythm  ABDOMEN:  No scars, normal bowel sounds, soft, non-distended, non-tender, no masses palpated, no hepatosplenomegally  MUSCULOSKELETAL:  There is no redness, warmth, or swelling of the joints. Full range of motion noted. Motor strength is 5 out of 5 all extremities bilaterally.   Tone is normal.  NEUROLOGIC:  Right side  0/5, left normal  SKIN:  no bruising or bleeding  EDEMA: Normal    Data    Labs Reviewed   CBC WITH AUTO DIFFERENTIAL - Abnormal; Notable for the following components:       Result Value    WBC 11.7 (*)     Neutrophils Absolute 9.2 (*)     All other components within normal limits    Narrative:     Performed at:  [facility] [address], [place], [address]   Phone [phone]   COMPREHENSIVE METABOLIC PANEL W/ REFLEX TO MG FOR LOW K - Abnormal; Notable for the following components:    Glucose 106 (*)     All other components within normal limits    Narrative:     Performed at:  [facility] [address], [place], [address]   Phone [phone]   POCT GLUCOSE - Abnormal; Notable for the following components:    POC Glucose 118 (*)     All other components within normal limits    Narrative:     Performed at:  [facility] [address],  [place], [address]   Phone [phone]   TROPONIN    Narrative:     Performed at:  [facility] [address], [place]   Phone [phone]   PROTIME-INR    Narrative:     Performed at:  [facility]  [address] [facility],  [place], [address]   Phone [phone]    Narrative:     Abdoul Tompkins  SFERF tel. [phone],  Rejected Test Name/Called to: cmp / Chano Fong RN, 01/03/2020 19:37, by  Justice Simons  Performed at:  [facility]  [address] [facility],  [place], [address]   Phone [phone]   CBC    Narrative:     Performed at:  [facility]  [address],  [place], [address]   Phone [phone]   LIPID PANEL   URINE DRUG SCREEN   POCT GLUCOSE     CBC:   Lab Results   Component Value Date    WBC 8.3 01/04/2020    RBC 4.76 01/04/2020    HGB 14.9 01/04/2020    HCT 44.9 01/04/2020    MCV 94.3 01/04/2020    MCH 31.3 01/04/2020    MCHC 33.2 01/04/2020    RDW 14.1 01/04/2020     01/04/2020    MPV 8.6 01/04/2020     CMP:    Lab Results   Component Value Date     01/03/2020    K 4.5 01/03/2020     01/03/2020    CO2 28 01/03/2020    BUN 17 01/03/2020    CREATININE 1.2 01/03/2020    GFRAA >60 01/03/2020    GFRAA >60 04/10/2013    AGRATIO 1.1 01/03/2020    LABGLOM >60 01/03/2020    LABGLOM >60>60 03/23/2012    GLUCOSE 106 01/03/2020    PROT 7.4 01/03/2020    PROT 6.0 03/23/2012    LABALBU 3.8 01/03/2020    CALCIUM 9.6 01/03/2020    BILITOT 0.7 01/03/2020    ALKPHOS 82 01/03/2020    AST 16 01/03/2020    ALT 14 01/03/2020       Imaging   CT HEAD WO CONTRAST [[id number]] Collected: 01/03/20 1835     Order Status: Completed Updated: 01/03/20 1849     Narrative:       EXAMINATION:  CT OF THE HEAD WITHOUT CONTRAST,  1/3/2020 6:26 pm    TECHNIQUE:  CT of the head was performed without the administration of intravenous  contrast. Dose modulation, iterative reconstruction, and/or weight based  adjustment of the mA/kV was utilized to reduce the radiation dose to as low  as reasonably achievable. COMPARISON:  None    HISTORY:  ORDERING SYSTEM PROVIDED HISTORY: Dense right hemiparesis; right sided  weakness  TECHNOLOGIST PROVIDED HISTORY:  Has a \"code stroke\" or \"stroke alert\" been called? ->Yes  Reason for exam:->Dense right hemiparesis; right sided weakness    FINDINGS:  BRAIN/VENTRICLES: There is low-attenuation within the left thalamus as well  as inferomedial left temporal lobe (series 2, images 24-25). Encephalomalacia in the right frontal lobe as well as small area of  encephalomalacia in the right inferior cerebellum.  Smaller old lacunar  infarctions in the right basal ganglia. Mild parenchymal volume loss with enlargement of the ventricles and cerebral  sulci.  There are nonspecific areas of hypoattenuation within the  periventricular and subcortical white matter, which likely represent chronic  microvascular ischemic change.  Intracranial atherosclerotic disease. ORBITS: The visualized portion of the orbits demonstrate no acute abnormality. SINUSES: The visualized paranasal sinuses and mastoid air cells demonstrate  no acute abnormality. SOFT TISSUES/SKULL: No acute abnormality of the visualized skull or soft  tissues.     Impression:       1. Low-attenuation within the left thalamus as well as left posteromedial  temporal lobe compatible with acute/subacute PCA territory infarctions.  No  associated hemorrhage. 2. Right frontal lobe encephalomalacia compatible with old right MCA  territory infarction.  Small area of encephalomalacia in the right cerebellum  compatible with old infarction. 3. Parenchymal volume loss and sequela of chronic microvascular ischemic  changes.   Critical results were called by Dr. Smita Moya      Patient Active Problem List   Diagnosis    Cellulitis    Hyponatremia    Arm pain    Essential hypertension    Ischemic chest pain ([address]    Mixed hyperlipidemia    ARF (acute renal failure) (HCC)    Automatic implantable cardioverter-defibrillator in situ    Tobacco abuse    Primary cardiomyopathy ([facility]    CAD (coronary artery disease)    Ischemic chest pain (HCC)    Unstable angina ([facility]    Chest pain    Cocaine abuse ([facility]    Ischemic cardiomyopathy    Hematochezia    Acute [facility] accident ([facility]) ([facility]         PLAN  1. The patient was admitted to telemetry  2. Neurology consult  3. PT/OT and social service consult  4. NPO and speech therapy evaluation  5. Urine drug screen  6. Resume aspirin and plavix  7. Echo and carotid doppler ordered  8.  MRI not done due to pacemaker

## 2020-01-04 NOTE — ED NOTES
At 2000, pt back from CT scan and informed by [facility] SYS GARTH, CT tech, that the IV contrast had infiltrated and study was not completed. Right upper extremity IV site and arm without any redness; + swelling noted. See pt's NIHSS for sensorimotor status of RUE prior to CT. Pt denied pain at the site. RUE elevated with ice pack placed initially, but pt repositioned self with assistance of his mother, onto right side. Distal circulation intact with + ulnar pulse and cap refill less than 2 seconds. Pt admitted to floor and transported @ 2239 as noted; right upper extremity again elevated on several pillows, pt instructed to keep arm elevated if possible and an additional ice pack applied. Right upper extremity reassessed by this RN and Seb Comer RN; continues to be swollen at that time with cap refill less than 2 seconds and + ulnar pulse.       Kayode Ornelas Post, RN  01/04/20 [address], RN  01/04/20 1494

## 2020-01-04 NOTE — ED NOTES
Pt a, a/o x 3, resps easy. NIHSS done by this RN and Jacky Deluna RN and Bhargav Jane RN at shift handoff. HOB elevated. VS as noted. Mother at bedside. Sinus rhythm noted on monitor with resps easy.      Lo Burns RN  01/03/20 1929

## 2020-01-04 NOTE — ED PROVIDER NOTES
I took call from [facility] for results of the CTA of the head and neck. This shows a focal occlusion of the mid P2 segment of the left posterior cerebral artery.   I spoke with the on-call neurosurgeon, Denver Gonsalez, regarding patient due to profound deficits and he states that they do not routinely go after PCA clots especially considering patient is greater than 24 hours out side last known normal.  He will consult as needed and speak with stroke team.     Will OMAR Aguilar  01/03/20 2138

## 2020-01-04 NOTE — PLAN OF CARE
Patient is a high fall risk. Patient free of falls this shift. Bed low, locked and alarmed at all times. Call light and bedside table is within reach. Yellow blanket on bed, arm band on wrist and fall sign posted in the room. Notified patient to ask for assistance when needed. Patient verbalized understanding.

## 2020-01-04 NOTE — PROGRESS NOTES
Pt educated on bedrest order and that he will need to call out for assistance with urinal or bedpan. Pt also with new R sided weakness. Pt states he is agreeable to use call light at this time if assistance is needed. Call light in reach of pt and explained how to use.

## 2020-01-05 PROBLEM — R13.12 OROPHARYNGEAL DYSPHAGIA: Status: ACTIVE | Noted: 2020-01-05

## 2020-01-05 PROCEDURE — 6360000002 HC RX W HCPCS: Performed by: INTERNAL MEDICINE

## 2020-01-05 PROCEDURE — 97535 SELF CARE MNGMENT TRAINING: CPT

## 2020-01-05 PROCEDURE — 97162 PT EVAL MOD COMPLEX 30 MIN: CPT

## 2020-01-05 PROCEDURE — 6370000000 HC RX 637 (ALT 250 FOR IP): Performed by: INTERNAL MEDICINE

## 2020-01-05 PROCEDURE — 99233 SBSQ HOSP IP/OBS HIGH 50: CPT | Performed by: PSYCHIATRY & NEUROLOGY

## 2020-01-05 PROCEDURE — 2580000003 HC RX 258: Performed by: INTERNAL MEDICINE

## 2020-01-05 PROCEDURE — 97166 OT EVAL MOD COMPLEX 45 MIN: CPT

## 2020-01-05 PROCEDURE — 97530 THERAPEUTIC ACTIVITIES: CPT

## 2020-01-05 PROCEDURE — 2060000000 HC ICU INTERMEDIATE R&B

## 2020-01-05 RX ORDER — FLUOXETINE HYDROCHLORIDE 20 MG/1
20 CAPSULE ORAL DAILY
Status: DISCONTINUED | OUTPATIENT
Start: 2020-01-05 | End: 2020-01-09 | Stop reason: HOSPADM

## 2020-01-05 RX ORDER — ALPRAZOLAM 0.25 MG/1
0.25 TABLET ORAL EVERY 6 HOURS PRN
Status: DISCONTINUED | OUTPATIENT
Start: 2020-01-05 | End: 2020-01-09 | Stop reason: HOSPADM

## 2020-01-05 RX ORDER — NICOTINE 21 MG/24HR
1 PATCH, TRANSDERMAL 24 HOURS TRANSDERMAL DAILY
Status: DISCONTINUED | OUTPATIENT
Start: 2020-01-05 | End: 2020-01-06

## 2020-01-05 RX ADMIN — ASPIRIN 81 MG: 81 TABLET, COATED ORAL at 08:43

## 2020-01-05 RX ADMIN — Medication 10 ML: at 08:44

## 2020-01-05 RX ADMIN — MAGNESIUM HYDROXIDE 30 ML: 400 SUSPENSION ORAL at 20:29

## 2020-01-05 RX ADMIN — ENOXAPARIN SODIUM 40 MG: 40 INJECTION SUBCUTANEOUS at 08:44

## 2020-01-05 RX ADMIN — CLOPIDOGREL 75 MG: 75 TABLET, FILM COATED ORAL at 08:43

## 2020-01-05 RX ADMIN — CARVEDILOL 12.5 MG: 6.25 TABLET, FILM COATED ORAL at 08:43

## 2020-01-05 RX ADMIN — CARVEDILOL 12.5 MG: 6.25 TABLET, FILM COATED ORAL at 20:29

## 2020-01-05 RX ADMIN — ALPRAZOLAM 0.25 MG: 0.25 TABLET ORAL at 17:12

## 2020-01-05 RX ADMIN — Medication 10 ML: at 20:29

## 2020-01-05 RX ADMIN — FLUOXETINE 20 MG: 20 CAPSULE ORAL at 20:29

## 2020-01-05 RX ADMIN — ATORVASTATIN CALCIUM 80 MG: 80 TABLET, FILM COATED ORAL at 08:43

## 2020-01-05 RX ADMIN — PANTOPRAZOLE SODIUM 40 MG: 40 TABLET, DELAYED RELEASE ORAL at 08:44

## 2020-01-05 ASSESSMENT — PAIN SCALES - GENERAL
PAINLEVEL_OUTOF10: 0

## 2020-01-05 NOTE — PROGRESS NOTES
Occupational Therapy   Occupational Therapy Initial Assessment  Date: 2020   Patient Name: Sophie Cazares  MRN: [id number]     : 1960    Date of Service: 2020    Lily Lomas scored a 14/24 on the AM-PAC ADL Inpatient form. Current research shows that an AM-PAC score of 17 or less is typically not associated with a discharge to the patient's home setting. Based on the patients AM-PAC score and their current ADL deficits, it is recommended that the patient have 5-7 sessions per week of Occupational Therapy at d/c to increase the patients independence. Discharge Recommendations:  IP Rehab       Assessment   Performance deficits / Impairments: Decreased functional mobility ; Decreased ROM; Decreased strength;Decreased sensation;Decreased balance;Decreased ADL status; Decreased safe awareness;Decreased coordination  Assessment: Pt is a 62year old male who was admitted to hospital for possible CVA. Pt is requiring increased assist with ADLs and functional mobility. Pt requires cues to use R side and attend to R side during functional trasnfers. Pt would benefit from continued skilled OT intervention to maximize independence with ADL/IADLs and return to PLOF. Treatment Diagnosis: CVA   Prognosis: Good  Decision Making: Medium Complexity  History: see above  Exam: see above  Assistance / Modification: mod A x2 for functional mobility, max A with LB dressing   OT Education: OT Role;Plan of Care;Transfer Training  REQUIRES OT FOLLOW UP: Yes  Activity Tolerance  Activity Tolerance: Patient limited by fatigue;Patient limited by pain  Activity Tolerance: Pt eager to complete OOB activity initially however has pt aware of defcitis and fatigue increased pt eager to return to bed. Pt requires cueing to attend to R side and bring R UE/LE with functional transfers. Safety Devices  Safety Devices in place: Yes  Type of devices: All fall risk precautions in place;Nurse notified; Left in bed;Call light Alert  MEWS Score: 1  Patient Currently in Pain: Denies  Oxygen Therapy  SpO2: 95 %  O2 Device: None (Room air)  Social/Functional History  Social/Functional History  Lives With: Alone  Type of Home: House  Home Layout: One level, Laundry in basement  Home Access: Level entry  Bathroom Shower/Tub: Tub/Shower unit  ADL Assistance: Independent  Homemaking Assistance: Independent  Ambulation Assistance: Independent  Transfer Assistance: Independent  Active : Yes  Additional Comments: patient questionable historian - unsure of accuracy, denied falls        Objective   Vision: Impaired  Vision Exceptions: Wears glasses for reading  Hearing: Within functional limits    Orientation  Overall Orientation Status: Impaired  Orientation Level: Oriented to place; Disoriented to person;Disoriented to situation;Disoriented to time  Observation/Palpation  Posture: Fair  Observation: decreased awareness of RUE/RLE and R environment  Balance  Sitting Balance: (SBA-CGA while sitting EOB for UB bathing and dressing task )  Standing Balance: Moderate assistance(mod A x2 )  Standing Balance  Time: ~45 sec  Activity: pull pants up over hips   Comment: cueing to utilize walker for suppoer, required mod A x2  ADL  Feeding: Setup(unable ot open packages, only utilizing L UE for feeding tasks this date )  Grooming: Setup;Minimal assistance(to wash face while sitting EOB )  UE Bathing: Moderate assistance  UE Dressing: Moderate assistance  LE Dressing: Maximum assistance(to don pants )  Tone LUE  LUE Tone: Normotonic  Coordination  Movements Are Fluid And Coordinated: No  Coordination and Movement description: Fine motor impairments;Gross motor impairments; Left UE     Bed mobility  Supine to Sit: Moderate assistance  Sit to Supine: Minimal assistance  Scooting:  Moderate assistance  Transfers  Sit to stand: 2 Person assistance(mod A x2)  Stand to sit: 2 Person assistance(mod A x2 )  Vision - Basic Assessment  Prior Vision: Wears glasses

## 2020-01-05 NOTE — PROGRESS NOTES
Shift assessment complete, meds given whole with water. Pt has been grumpy this evening, has not communicated much with me. Will not do an NIH scale. Is very upset about his prognosis and that he cannot get into his phone. Pt remains very weak trying to get to the side of the bed. Vitals have been stable. Pt resting comfortably. No other concerns, will continue to monitor.

## 2020-01-05 NOTE — PROGRESS NOTES
GFRAA >60 01/03/2020    GFRAA >60 04/10/2013    LABGLOM >60 01/03/2020    LABGLOM >60>60 03/23/2012    GLUCOSE 106 01/03/2020       ASSESSMENT      Patient Active Problem List   Diagnosis    Cellulitis    Hyponatremia    Arm pain    Essential hypertension    Ischemic chest pain (HCC)    Mixed hyperlipidemia    ARF (acute renal failure) (HCC)    Automatic implantable cardioverter-defibrillator in situ    Tobacco abuse    Primary cardiomyopathy ([address]    CAD (coronary artery disease)    Ischemic chest pain ([address]    Unstable angina ([address]    Chest pain    Cocaine abuse ([address]    Ischemic cardiomyopathy    Hematochezia    Acute cerebrovascular accident (CVA) ([address]    Oropharyngeal dysphagia         PLAN  1. Echo and carotid doppler ordered  2. PT/OT and speech therapy  3. Social service for disposition   4.  Acute rehab consult

## 2020-01-05 NOTE — PROGRESS NOTES
Perfect serve message sent to Dr Lisa Cifuentes is a smoker 1-2 pack a day. Could he get nicotine patch.  Thanks \"

## 2020-01-05 NOTE — PROGRESS NOTES
NEUROLOGY FOLLOWUP    HISTORY OF PRESENT ILLNESS :     Sam Srinivasan is a 62 y.o. male   History was obtained from the patient and dictations in the chart  Additional history was obtained from the patient's mother at the bedside. Patient denies any major changes in his neurological symptoms. He continues to have right-sided weakness. Detailed history:  Patient lives alone. There is a history of cocaine abuse until about 4 years ago. Patient's family states that he has been clean since that time. Patient was found on the floor by his stepdad with right-sided weakness. He was brought to the hospital.  Since the time of onset was not clear patient was not a TPA candidate. He was admitted to the telemetry unit. Patient's right-sided weakness has persisted.   Patient has known cardiomyopathy and has cardiac defibrillator     REVIEW OF SYSTEMS       Constitutional:  []   Chills   []  Fatigue   []  Fevers   []  Malaise   []  Weight loss     [x] Denies all of the above    Respiratory:   []  Cough    []  Shortness of breath         [x] Denies all of the above     Cardiovascular:   []  Chest pain    []  Exertional chest pressure/discomfort           [] Palpitations    []  Syncope     [x] Denies all of the above    Past Medical History:   Diagnosis Date    CAD (coronary artery disease)     MI CABG x5    Chronic kidney disease     Collapsed lung     Bilat    GERD (gastroesophageal reflux disease)     Hematuria     Hyperlipidemia     Hypertension     ICD (implantable cardioverter-defibrillator) in place     Mural thrombus of left ventricle     Tortuous aorta ([address]      Family History   Problem Relation Age of Onset    Asthma Mother     Alcohol Abuse Father     Diabetes Brother     Heart Attack Paternal Uncle      Social History     Socioeconomic History    Marital status:      Spouse name: None    Number of the right side. Unable to ambulate at this time. Plantar responses extensor on the right and flexor on the left. No carotid bruit. No neck stiffness. DATA :  LABS:  General Labs:    CBC:   Lab Results   Component Value Date    WBC 8.3 01/04/2020    RBC 4.76 01/04/2020    HGB 14.9 01/04/2020    HCT 44.9 01/04/2020    MCV 94.3 01/04/2020    MCH 31.3 01/04/2020    MCHC 33.2 01/04/2020    RDW 14.1 01/04/2020     01/04/2020    MPV 8.6 01/04/2020     BMP:    Lab Results   Component Value Date     01/03/2020    K 4.5 01/03/2020     01/03/2020    CO2 28 01/03/2020    BUN 17 01/03/2020    LABALBU 3.8 01/03/2020    CREATININE 1.2 01/03/2020    CALCIUM 9.6 01/03/2020    GFRAA >60 01/03/2020    GFRAA >60 04/10/2013    LABGLOM >60 01/03/2020    LABGLOM >60>60 03/23/2012    GLUCOSE 106 01/03/2020     RADIOLOGY REVIEW:  I have reviewed radiology image(s) and reports(s) of: CT head as well as CT angiogram      IMPRESSION :  Acute left posterior cerebral artery infarction involving the left thalamus and the left medial temporal lobe. This is most likely from cardiac emboli.   CT angiogram shows focal occlusion of the P2 segment of the left posterior cerebral artery  CT head confirms the nonhemorrhagic stroke  Cardiomyopathy  Other risk factors include hypertension and hyperlipidemia  History of cocaine abuse in the past  Patient Active Problem List   Diagnosis    Cellulitis    Hyponatremia    Arm pain    Essential hypertension    Ischemic chest pain (HCC)    Mixed hyperlipidemia    ARF (acute renal failure) (HCC)    Automatic implantable cardioverter-defibrillator in situ    Tobacco abuse    Primary cardiomyopathy ([address]    CAD (coronary artery disease)    Ischemic chest pain ([address]    Unstable angina ([address]    Chest pain    Cocaine abuse ([address]    Ischemic cardiomyopathy    Hematochezia    Acute cerebrovascular accident (CVA) ([address]    Oropharyngeal dysphagia       RECOMMENDATIONS :  Discussed with the patient  Discussed with patient's mother at the bedside  Discussed with Dr. Omar Chambers antiplatelet therapy for the time being  Await echocardiogram  If the ejection fraction is significantly decreased patient may need chronic anticoagulation. However there is been some questions raised about patient's compliance. High risk patient with acute stroke          Please note a portion of this chart was generated using dragon dictation software. Although every effort was made to ensure the accuracy of this automated transcription, some errors in transcription may have occurred.          Lillian Gutiérrez M.D.

## 2020-01-05 NOTE — PROGRESS NOTES
Nicotine patch applied. Order received for xanax. No need of xanax this time. Will continue monitor. Family at bed side.

## 2020-01-05 NOTE — PROGRESS NOTES
Perfect serve message sent to Dr Mariah Patiño- Pt said to his mom that \" I don't want to live any more, I want to go to [facility] \" pt was tearful. Did not mention any suicidal thoughts or plan to this RN.  Thanks

## 2020-01-05 NOTE — PROGRESS NOTES
Physical Therapy    Facility/Department: [address]  Initial Assessment    NAME: Jay Jay Fenton  : 1960  MRN: [id number]    Date of Service: 2020    Discharge Recommendations:  Jay Jay Fenton scored a  on the AM-PAC short mobility form. Current research shows that an AM-PAC score of 17 or less is typically not associated with a discharge to the patient's home setting. Based on the patients AM-PAC score and their current functional mobility deficits, it is recommended that the patient have 5-7 sessions per week of Physical Therapy at d/c to increase the patients independence. 5-7 sessions per week   PT Equipment Recommendations  Equipment Needed: No    Assessment   Body structures, Functions, Activity limitations: Decreased safe awareness;Decreased functional mobility ; Decreased balance;Decreased ROM; Decreased endurance;Decreased strength;Decreased sensation  Assessment: Patient not at baseline function and would benefit from skilled PT to address above deficits and facilitate return to baseline function. Patient with significant R weakness, decreased awaress of RUE/LE and decreaed awareness of deficits. Patient at significant risk of falling  Treatment Diagnosis: decreased functional mobility, impaired gait, R weakness s/p CVA  Prognosis: Good  Decision Making: Medium Complexity  Clinical Presentation: evolving  PT Education: Transfer Training;Goals;PT Role;Functional Mobility Training;Plan of Care;General Safety;Gait Training  Patient Education: d/c recommendations  Barriers to Learning: cognitive  REQUIRES PT FOLLOW UP: Yes  Activity Tolerance  Activity Tolerance: Patient limited by fatigue       Patient Diagnosis(es): The encounter diagnosis was Cerebrovascular accident (CVA), unspecified mechanism ([facility] [address]      has a past medical history of CAD (coronary artery disease), Chronic kidney disease, Collapsed lung, GERD (gastroesophageal reflux disease), Hematuria, Hyperlipidemia, Hypertension, ICD (implantable cardioverter-defibrillator) in place, Mural thrombus of left ventricle, and Tortuous aorta ([facility] [address] has a past surgical history that includes Colon surgery; Cholecystectomy, open; Arm Debridement (10-26-10); Cardiac surgery; Colonoscopy (07/18/2016); pacemaker placement (Left, 2013); Upper gastrointestinal endoscopy (05/01/2017); Upper gastrointestinal endoscopy (06/27/2017); and Colonoscopy (11/20/2019). Restrictions  Restrictions/Precautions  Restrictions/Precautions: Fall Risk(high fall risk)  Required Braces or Orthoses?: No  Position Activity Restriction  Other position/activity restrictions: Pt arrived via EMS from home for c/c of AMS and R sided weakness, CT head showed \" Low-attenuation within the left thalamus as well as left posteromedial temporal lobe compatible with acute/subacute PCA territory infarctions. Vision/Hearing  Vision: Impaired  Vision Exceptions: Wears glasses for reading  Hearing: Within functional limits     Subjective  General  Chart Reviewed: Yes  Family / Caregiver Present: No  Diagnosis: CVA  Follows Commands: Impaired  Other (Comment): occasional delayed responses, increased cues needed at times  General Comment  Comments: supine in bed upon arrival with telesitter in place  Subjective  Subjective: Denied pain. Agreeable to therapy. Decreased verbal communication  Pain Screening  Patient Currently in Pain: Denies          Orientation  Orientation  Overall Orientation Status: Impaired  Orientation Level: Disoriented to situation;Oriented to person;Oriented to place; Disoriented to time  Social/Functional History  Social/Functional History  Lives With: Alone  Type of Home: House  Home Layout: One level, Laundry in basement  Home Access: Level entry  Bathroom Shower/Tub: Tub/Shower unit  ADL Assistance: Independent  Homemaking Assistance: Independent  Ambulation Assistance: Independent  Transfer Assistance: Independent  Active :  Yes  Additional [phone])  Mobility Inpatient CMS 0-100% Score: 86.62 (01/05/20 0948)  Mobility Inpatient CMS G-Code Modifier : CM (01/05/20 5064)          Goals  Short term goals  Time Frame for Short term goals: To be met prior to discharge  Short term goal 1: Bed mobility with mod A  Short term goal 2: Sit to/from stand with mod A  Short term goal 3: Bed to/from chair with mod A  Short term goal 4: Ambulate 25 feet with AAD and mod A  Patient Goals   Patient goals :  To go home       Therapy Time   Individual Concurrent Group Co-treatment   Time In 0844         Time Out 0910         Minutes 26         Timed Code Treatment Minutes: [address]       Jesse Jarvis, PT     Thanks, Jesse Jarvis PT, DPT [id number]

## 2020-01-06 LAB
LEFT VENTRICULAR EJECTION FRACTION MODE: NORMAL
LV EF: 35 %
LV EF: 35 %
LVEF MODALITY: NORMAL

## 2020-01-06 PROCEDURE — 97112 NEUROMUSCULAR REEDUCATION: CPT

## 2020-01-06 PROCEDURE — 93306 TTE W/DOPPLER COMPLETE: CPT

## 2020-01-06 PROCEDURE — 2060000000 HC ICU INTERMEDIATE R&B

## 2020-01-06 PROCEDURE — 97530 THERAPEUTIC ACTIVITIES: CPT

## 2020-01-06 PROCEDURE — 94760 N-INVAS EAR/PLS OXIMETRY 1: CPT

## 2020-01-06 PROCEDURE — 92526 ORAL FUNCTION THERAPY: CPT

## 2020-01-06 PROCEDURE — 99233 SBSQ HOSP IP/OBS HIGH 50: CPT | Performed by: PSYCHIATRY & NEUROLOGY

## 2020-01-06 PROCEDURE — 2580000003 HC RX 258: Performed by: INTERNAL MEDICINE

## 2020-01-06 PROCEDURE — 97535 SELF CARE MNGMENT TRAINING: CPT

## 2020-01-06 PROCEDURE — 6370000000 HC RX 637 (ALT 250 FOR IP): Performed by: INTERNAL MEDICINE

## 2020-01-06 PROCEDURE — 6360000002 HC RX W HCPCS: Performed by: INTERNAL MEDICINE

## 2020-01-06 RX ORDER — LORAZEPAM 2 MG/ML
0.5 INJECTION INTRAMUSCULAR EVERY 6 HOURS PRN
Status: DISCONTINUED | OUTPATIENT
Start: 2020-01-06 | End: 2020-01-09 | Stop reason: HOSPADM

## 2020-01-06 RX ORDER — NICOTINE 21 MG/24HR
1 PATCH, TRANSDERMAL 24 HOURS TRANSDERMAL DAILY
Status: DISCONTINUED | OUTPATIENT
Start: 2020-01-06 | End: 2020-01-09 | Stop reason: HOSPADM

## 2020-01-06 RX ADMIN — ASPIRIN 81 MG: 81 TABLET, COATED ORAL at 09:20

## 2020-01-06 RX ADMIN — PANTOPRAZOLE SODIUM 40 MG: 40 TABLET, DELAYED RELEASE ORAL at 09:24

## 2020-01-06 RX ADMIN — Medication 10 ML: at 09:21

## 2020-01-06 RX ADMIN — FLUOXETINE 20 MG: 20 CAPSULE ORAL at 09:20

## 2020-01-06 RX ADMIN — ATORVASTATIN CALCIUM 80 MG: 80 TABLET, FILM COATED ORAL at 09:20

## 2020-01-06 RX ADMIN — ALPRAZOLAM 0.25 MG: 0.25 TABLET ORAL at 21:08

## 2020-01-06 RX ADMIN — CARVEDILOL 12.5 MG: 6.25 TABLET, FILM COATED ORAL at 09:20

## 2020-01-06 RX ADMIN — LORAZEPAM 0.5 MG: 2 INJECTION INTRAMUSCULAR; INTRAVENOUS at 23:14

## 2020-01-06 RX ADMIN — ENOXAPARIN SODIUM 40 MG: 40 INJECTION SUBCUTANEOUS at 09:20

## 2020-01-06 RX ADMIN — CLOPIDOGREL 75 MG: 75 TABLET, FILM COATED ORAL at 09:20

## 2020-01-06 RX ADMIN — Medication 10 ML: at 21:09

## 2020-01-06 RX ADMIN — CARVEDILOL 12.5 MG: 6.25 TABLET, FILM COATED ORAL at 21:08

## 2020-01-06 ASSESSMENT — PAIN SCALES - GENERAL
PAINLEVEL_OUTOF10: 0

## 2020-01-06 NOTE — PROGRESS NOTES
disease), Hematuria, Hyperlipidemia, Hypertension, ICD (implantable cardioverter-defibrillator) in place, Mural thrombus of left ventricle, and Tortuous aorta ([address] has a past surgical history that includes Colon surgery; Cholecystectomy, open; Arm Debridement (10-26-10); Cardiac surgery; Colonoscopy (07/18/2016); pacemaker placement (Left, 2013); Upper gastrointestinal endoscopy (05/01/2017); Upper gastrointestinal endoscopy (06/27/2017); and Colonoscopy (11/20/2019). Restrictions  Restrictions/Precautions  Restrictions/Precautions: Fall Risk(high fall risk)  Required Braces or Orthoses?: No  Position Activity Restriction  Other position/activity restrictions: Pt arrived via EMS from home for c/c of AMS and R sided weakness, CT head showed \" Low-attenuation within the left thalamus as well as left posteromedial temporal lobe compatible with acute/subacute PCA territory infarctions. Subjective   General  Chart Reviewed: Yes  Additional Pertinent Hx: CAD, GERD, Hematuria, HTN, Hyperlipidemia, CAD, CKD, Pacemaker, ICD, Collapsed Lung   Family / Caregiver Present: No  Referring Practitioner: Chandler Oreilly MD  Diagnosis: CVA   Subjective  Subjective: Pt was lying in bed upon arrival. Pt agreeable to PT/OT this date. Pre Treatment Pain Screening  Intervention List: Patient able to continue with treatment  Vital Signs  Patient Currently in Pain: Denies   Orientation  Orientation  Overall Orientation Status: Impaired  Orientation Level: Oriented to place; Disoriented to person;Disoriented to situation;Disoriented to time  Objective    ADL  Feeding: Setup  Grooming: Setup;Minimal assistance(wash face, comb hair)  UE Bathing: Moderate assistance  LE Bathing: Maximum assistance  UE Dressing:  Moderate assistance  LE Dressing: Maximum assistance        Standing Balance  Time: ~2 minutes  Activity: sit<>stand modA of 2, side steps modA + Christa with verbal cues  Bed mobility  Supine to Sit: Dependent/Total(modA of 2)  Sit to Supine: Dependent/Total(modA of 2)  Scooting: Moderate assistance                    Neuromuscular Education  Neuromuscular education: Yes  NDT Treatment: Upper extremity; Sitting(Simultaneous filing. User may not have seen previous data.)  Trunk Control: CGA to close SBA sitting EOB  Functional Movement Patterns: crossing midline RUE     Cognition  Overall Cognitive Status: Exceptions  Arousal/Alertness: Delayed responses to stimuli  Following Commands: Follows one step commands with increased time  Attention Span: Difficulty dividing attention  Memory: Decreased recall of recent events  Safety Judgement: Decreased awareness of need for safety;Decreased awareness of need for assistance  Insights: Decreased awareness of deficits  Initiation: Requires cues for some  Sequencing: Requires cues for some  Cognition Comment: Pt requires cueing to utilize R side and assist needed to recall R UE during functional transfers and tasks                  Upper Extremity Function  NDT Treatment: Upper extremity; Sitting(Simultaneous filing.  User may not have seen previous data.)                       Plan   Plan  Times per week: 5-7x/week   Plan weeks: until discharge   Current Treatment Recommendations: Strengthening, Balance Training, ROM, Functional Mobility Training, Neuromuscular Re-education, Safety Education & Training, Self-Care / ADL, Patient/Caregiver Education & Training, Cognitive/Perceptual Training    AM-PAC Score        AM-[facility] Inpatient Daily Activity Raw Score: 14 (01/06/20 1636)  AM-PAC Inpatient ADL T-Scale Score : 33.39 (01/06/20 1636)  ADL Inpatient CMS 0-100% Score: 59.67 (01/06/20 1636)  ADL Inpatient CMS G-Code Modifier : CK (01/06/20 1636)    Goals  Short term goals  Time Frame for Short term goals: STG=LTG  Short term goal 1: Pt will complete grooming tasks with B UE with min A   Short term goal 2: Pt will complete functional transfer with min A with AD as needed  Short term goal 3: Pt will complete LB dressing tasks with min A with AD/AE as needed  Patient Goals   Patient goals : Return home        Therapy Time   Individual Concurrent Group Co-treatment   Time In       0398   Time Out       1621   Minutes       38        Timed Code Treatment Minutes:  23 Minutes    Total Treatment Minutes:  [address], OTR/L [id number]

## 2020-01-06 NOTE — CONSULTS
tinnitus, ear drainage, sinus pressure, nasal congestion, epistaxis and snoring. RESPIRATORY: Negative for hemoptysis, cough, sputum production. CARDIOVASCULAR: negative for chest pain, palpitations, exertional chest pressure/discomfort, edema, syncope. GASTROINTESTINAL: negative for nausea, vomiting, diarrhea, constipation, blood in stool and abdominal pain. GENITOURINARY: negative for frequency, dysuria, urinary incontinence, decreased urine volume, and hematuria. HEMATOLOGIC/LYMPHATIC: negative for easy bruising, bleeding and lymphadenopathy. ALLERGIC/IMMUNOLOGIC: negative for recurrent infections, angioedema, anaphylaxis and drug reactions. ENDOCRINE: negative for weight changes and diabetic symptoms including polyuria, polydipsia and polyphagia. MUSCULOSKELETAL: negative for pain, joint swelling, decreased range of motion and muscle weakness. NEUROLOGICAL: negative for headaches, slurred speech, positive unilateral weakness. PSYCHIATRIC/BEHAVIORAL: negative for hallucinations, behavioral problems, confusion and agitation. All pertinent positives are noted in the HPI. Physical Examination:  Vitals:   Patient Vitals for the past 24 hrs:   BP Temp Temp src Pulse Resp SpO2 Weight   01/06/20 0815 (!) 143/85 97.4 °F (36.3 °C) Axillary 60 16 95 % --   01/06/20 0743 -- -- -- -- -- -- 205 lb (93 kg)   01/06/20 0555 118/83 98.3 °F (36.8 °C) Oral 62 16 95 % --   01/05/20 2322 125/87 97.9 °F (36.6 °C) Oral 77 14 98 % --   01/05/20 2006 120/82 97.4 °F (36.3 °C) Oral 71 14 95 % --   01/05/20 1714 127/87 98.1 °F (36.7 °C) Temporal 66 16 96 % --   01/05/20 1252 (!) 142/74 97.8 °F (36.6 °C) Oral 70 16 95 % --     Psych: Stable mood, normal judgement, normal affect. Const: No distress  Eyes: Conjunctiva noninjected, no icterus noted; pupils equal, round. HENT: Atraumatic, normocephalic; Oral mucosa moist  Neck: Trachea midline, neck supple. No thyromegaly noted.   CV: No audible murmurs  Resp: No dilated.   -A bubble study was performed and fails to show evidence of shunting.   -Pacer / ICD wire is visualized in the right heart. Most recent imaging studies revealed   EXAMINATION:   CT OF THE HEAD WITHOUT CONTRAST,  1/3/2020 6:26 pm       TECHNIQUE:   CT of the head was performed without the administration of intravenous   contrast. Dose modulation, iterative reconstruction, and/or weight based   adjustment of the mA/kV was utilized to reduce the radiation dose to as low   as reasonably achievable.       COMPARISON:   None       HISTORY:   ORDERING SYSTEM PROVIDED HISTORY: Dense right hemiparesis; right sided   weakness   TECHNOLOGIST PROVIDED HISTORY:   Has a \"code stroke\" or \"stroke alert\" been called? ->Yes   Reason for exam:->Dense right hemiparesis; right sided weakness       FINDINGS:   BRAIN/VENTRICLES: There is low-attenuation within the left thalamus as well   as inferomedial left temporal lobe (series 2, images 24-25).     Encephalomalacia in the right frontal lobe as well as small area of   encephalomalacia in the right inferior cerebellum.  Smaller old lacunar   infarctions in the right basal ganglia.       Mild parenchymal volume loss with enlargement of the ventricles and cerebral   sulci.  There are nonspecific areas of hypoattenuation within the   periventricular and subcortical white matter, which likely represent chronic   microvascular ischemic change.  Intracranial atherosclerotic disease.       ORBITS: The visualized portion of the orbits demonstrate no acute abnormality.       SINUSES: The visualized paranasal sinuses and mastoid air cells demonstrate   no acute abnormality.       SOFT TISSUES/SKULL: No acute abnormality of the visualized skull or soft   tissues.           Impression   1. Low-attenuation within the left thalamus as well as left posteromedial   temporal lobe compatible with acute/subacute PCA territory infarctions.  No   associated hemorrhage.    2. Right frontal plaque   in the carotid bulbs and proximal internal carotid arteries bilaterally.       VERTEBRAL ARTERIES: No dissection, arterial injury, or significant stenosis. Left vertebral artery is dominant.       SOFT TISSUES: The lung apices are clear.  No cervical or superior mediastinal   lymphadenopathy.  The larynx and pharynx are unremarkable.  No acute   abnormality of the salivary and thyroid glands.       BONES: No acute osseous abnormality.           CTA HEAD:       ANTERIOR CIRCULATION: No significant stenosis of the intracranial internal   carotid, anterior cerebral, or middle cerebral arteries. No aneurysm.       POSTERIOR CIRCULATION: Abrupt occlusion of the mid P2 segment of the left   posterior cerebral artery (series 2, images 235-236).  The intradural   vertebral arteries, basilar artery, and right posterior cerebral artery are   patent without focal stenosis.  Left intradural vertebral artery   atherosclerotic calcifications.       OTHER: No dural venous sinus thrombosis on this non-dedicated study.       BRAIN: No mass effect or midline shift. No extra-axial fluid collection. The   gray-white differentiation is maintained.           Impression   1. Focal occlusion of the mid P2 segment of the left posterior cerebral   artery. 2. No additional hemodynamically significant stenosis or branch occlusion in   the cervical or intracranial arterial circulation. The above laboratory data have been reviewed. The above imaging data have been reviewed. The above medical testing have been reviewed. Body mass index is 27.8 kg/m². Assessment and Plan:  Acute left thalamic and temporal lobe infarcts: PT/OT for hemiplegia  Dysphagia: SLP  HLD  HTN  CAD  Cardiomyopathy  H/O Cocaine abuse    Dispo: Patient is an appropriate ARU candidate. Precert initiated today. We will continue to follow. Thank you for the consultation.     Brianna Damon MD 1/6/2020, 11:58 AM     * This document was created using dictation software. While all precautions were taken to ensure accuracy, errors may have occurred. Please disregard any typographical errors.

## 2020-01-06 NOTE — PROGRESS NOTES
Recommendations: Strengthening, Transfer Training, Endurance Training, Neuromuscular Re-education, Balance Training, Gait Training, Home Exercise Program, Functional Mobility Training, Safety Education & Training  Safety Devices  Type of devices:  All fall risk precautions in place, Call light within reach, Bed alarm in place, Left in bed, Telesitter in use, Nurse notified, Gait belt, Patient at risk for falls  Restraints  Initially in place: No     Therapy Time   Individual Concurrent Group Co-treatment   Time In       1547   Time Out       1625   Minutes       38        Timed Code Treatment Minutes:  38    Total Treatment Minutes:  OPHELIA [address], [address], [address] [facility]

## 2020-01-06 NOTE — PROGRESS NOTES
Upon assessment pt remains AOx4 but forgetful with short term memory as pt repeats questions several times during assessment with no memory of prior answer from this RN. Pt calm and cooperative with assessment.

## 2020-01-06 NOTE — PLAN OF CARE
Problem: SAFETY  Goal: Free from accidental physical injury  Intervention: ASSESS FOR FALL RISK  Note:   Patient is a high fall risk. Patient free of falls this shift. Bed low, locked and alarmed at all times. Call light and bedside table is within reach. Yellow blanket on bed, arm band on wrist and fall sign posted in the room. Notified patient to ask for assistance when needed. Patient verbalized understanding but has been impulsive so far this admission. Pt room directly across from nurses station in view of monitor tech. Pt remains on camera and in low bed with fall mats. Bed alarmed and bed pad alarm hooked into nurse call light system. Problem: HEMODYNAMIC STATUS  Goal: Patient has stable vital signs and fluid balance  Note:   NIHSS remains unchanged. Problem: ACTIVITY INTOLERANCE/IMPAIRED MOBILITY  Goal: Mobility/activity is maintained at optimum level for patient  Intervention: ASSESS FOR BARRIERS TO MOBILITY/ACTIVITY  Note:   Pt with new R sided weakness, pt able to independently turn self and reposition in bed as needed. 2x person assist to be provided with urinal use and standing at bedside.

## 2020-01-06 NOTE — PROGRESS NOTES
of choking/ poor tolerance of diet noted recommend NPO pending swallow re-assessment    ST.) Pt will tolerate recommended diet without s/s of aspiration. (20 ONGOING)    2.) Pt will tolerate diet advance to least restricted diet, as clinically indicated, with no signs of aspiration.  (20 ONGOING)   4.) Pt will improve oral motor function via bolus control exercises 5/5. (20 ONGOING)     Plan: Continued daily Dysphagia treatment with goals per plan of care. Patient/Family Education:Education given to the Pt, pt's mother and nurse, who verbalized understanding re: strict aspiration precautions to utilize with po (small bites/ sips, reduce intake, alternate textures, ensure upright with all Po and strict GERD precautions). Discharge Recommendations: Recommend continued speech therapy for speech/ cognition and dysphagia prior to and following d/c. Timed Code Treatment: 0     Total Treatment Time: 12 minutes     Signature:  Warren Giordano. LU  [id number] [facility] #[id number] 2020 2:57 PM  Speech-Language Pathologist

## 2020-01-06 NOTE — PROGRESS NOTES
Department of Internal Medicine  Progress Note      SUBJECTIVE:  No new complaints, more alert today, having lunch    Review of Systems - General ROS:denies any headache or weakness    Respiratory ROS: denies any shortness of breath, dyspnea on exertion, wheezing, or cough   Cardiovascular ROS: denies any chest pain, palpitations, shortness of breath, dizziness syncope or swelling in extremities  Gastrointestinal ROS: denies any abdominal pain, acid reflux, change in bowel habits or blood in stool, dark or tarry stools       OBJECTIVE:      PHYSICAL:   VITALS:  BP 97/60   Pulse 79   Temp 97.6 °F (36.4 °C) (Oral)   Resp 16   Ht 6' (1.829 m)   Wt 205 lb (93 kg)   SpO2 95%   BMI 27.80 kg/m²   PULSE OXIMETRY RANGE: SpO2  Av.4 %  Min: 94 %  Max: 98 %    Intake/Output Summary (Last 24 hours) at 2020 1309  Last data filed at 2020 1307  Gross per 24 hour   Intake 720 ml   Output 1375 ml   Net -655 ml      CONSTITUTIONAL:  awake, alert, cooperative, no apparent distress, and appears stated age  NECK:  Supple, symmetrical, trachea midline, no adenopathy, thyroid symmetric, not enlarged and no tenderness, skin normal  LUNGS:  No increased work of breathing, good air exchange, clear to auscultation bilaterally, no crackles or wheezing  CARDIOVASCULAR:  regular rate and rhythm  ABDOMEN:  No scars, normal bowel sounds, soft, non-distended, non-tender, no masses palpated, no hepatosplenomegally  NEUROLOGIC:  Awake, alert and oriented, muscle strength on the right 2/5 , upper better then lower  EDEMA: Normal    Data      CBC:   Lab Results   Component Value Date    WBC 8.3 2020    RBC 4.76 2020    HGB 14.9 2020    HCT 44.9 2020    MCV 94.3 2020    MCH 31.3 2020    MCHC 33.2 2020    RDW 14.1 2020     2020    MPV 8.6 2020     CMP:    Lab Results   Component Value Date     2020    K 4.5 2020     2020    CO2 28 01/03/2020    BUN 17 01/03/2020    CREATININE 1.2 01/03/2020    GFRAA >60 01/03/2020    GFRAA >60 04/10/2013    AGRATIO 1.1 01/03/2020    LABGLOM >60 01/03/2020    LABGLOM >60>60 03/23/2012    GLUCOSE 106 01/03/2020    PROT 7.4 01/03/2020    PROT 6.0 03/23/2012    LABALBU 3.8 01/03/2020    CALCIUM 9.6 01/03/2020    BILITOT 0.7 01/03/2020    ALKPHOS 82 01/03/2020    AST 16 01/03/2020    ALT 14 01/03/2020       ASSESSMENT      Patient Active Problem List   Diagnosis    Cellulitis    Hyponatremia    Arm pain    Essential hypertension    Ischemic chest pain (HCC)    Mixed hyperlipidemia    ARF (acute renal failure) (HCC)    Automatic implantable cardioverter-defibrillator in situ    Tobacco abuse    Primary cardiomyopathy ([address]    CAD (coronary artery disease)    Ischemic chest pain ([address]    Unstable angina ([address]    Chest pain    Cocaine abuse ([address]    Ischemic cardiomyopathy    Hematochezia    Acute cerebrovascular accident (CVA) ([address]    Oropharyngeal dysphagia         PLAN  1. Continue current care  2. Started on Prozac for depression  3.  Awaiting disposition to acute rehabilitation

## 2020-01-06 NOTE — PROGRESS NOTES
Return from ECHO - am po medications given - swallowed whole with orange juice with no choking or coughing- set up with breakfast tray -feeding self with LUE- RUE with weakness- limited ROM - RLE flaccid -LLE with moderate strength- oriented to person - confusion to place time and situation - re oriented to place and situation at time- now with meal - call light in reach - bed alarm on -in specialty bed- low bed since attempts to get up without assistance to ensure safety -video camera in place to ensure safety - bed alarm on

## 2020-01-07 PROCEDURE — 2580000003 HC RX 258: Performed by: INTERNAL MEDICINE

## 2020-01-07 PROCEDURE — 97530 THERAPEUTIC ACTIVITIES: CPT

## 2020-01-07 PROCEDURE — 97112 NEUROMUSCULAR REEDUCATION: CPT

## 2020-01-07 PROCEDURE — 2060000000 HC ICU INTERMEDIATE R&B

## 2020-01-07 PROCEDURE — 99232 SBSQ HOSP IP/OBS MODERATE 35: CPT | Performed by: PSYCHIATRY & NEUROLOGY

## 2020-01-07 PROCEDURE — 97116 GAIT TRAINING THERAPY: CPT

## 2020-01-07 PROCEDURE — 6360000002 HC RX W HCPCS: Performed by: INTERNAL MEDICINE

## 2020-01-07 PROCEDURE — 6370000000 HC RX 637 (ALT 250 FOR IP): Performed by: INTERNAL MEDICINE

## 2020-01-07 RX ADMIN — Medication 10 ML: at 10:23

## 2020-01-07 RX ADMIN — CARVEDILOL 12.5 MG: 6.25 TABLET, FILM COATED ORAL at 10:27

## 2020-01-07 RX ADMIN — CLOPIDOGREL 75 MG: 75 TABLET, FILM COATED ORAL at 10:27

## 2020-01-07 RX ADMIN — PANTOPRAZOLE SODIUM 40 MG: 40 TABLET, DELAYED RELEASE ORAL at 10:27

## 2020-01-07 RX ADMIN — FLUOXETINE 20 MG: 20 CAPSULE ORAL at 10:28

## 2020-01-07 RX ADMIN — ENOXAPARIN SODIUM 40 MG: 40 INJECTION SUBCUTANEOUS at 10:23

## 2020-01-07 RX ADMIN — ALPRAZOLAM 0.25 MG: 0.25 TABLET ORAL at 04:06

## 2020-01-07 RX ADMIN — ASPIRIN 81 MG: 81 TABLET, COATED ORAL at 10:27

## 2020-01-07 RX ADMIN — ATORVASTATIN CALCIUM 80 MG: 80 TABLET, FILM COATED ORAL at 10:28

## 2020-01-07 RX ADMIN — Medication 10 ML: at 22:14

## 2020-01-07 ASSESSMENT — PAIN SCALES - GENERAL
PAINLEVEL_OUTOF10: 0

## 2020-01-07 NOTE — PLAN OF CARE
Problem: SAFETY  Goal: Free from accidental physical injury  Outcome: Ongoing     Pt anxious and attempts to get OOB without assistance. Pt very unsteady and needs maximum assistance with transfers. Urinal offered multiple times;pt did not urinate. Pt repositioned multiple times and RN staying in room with patient for safety. Pt given xanax for anxiety and agitation with restlessness at 0406. Pt is a high fall risk. Pt remains free from falls, throughout night. Bed alarm remains in place, door open. Camera remains at bedside for safety monitoring. Pt encouraged to use call light for needs throughout night; call light is within reach. Bed lock is in lowest position. Will continue to monitor throughout night.

## 2020-01-07 NOTE — FLOWSHEET NOTE
Called Dr. Matilde Tyler and updated on pt's agitation tonight and pt's refusal to take xanax. Order placed for Ativan IM now;see MAR & all flowsheets. Friends here at bedside now.

## 2020-01-07 NOTE — PROGRESS NOTES
(implantable cardioverter-defibrillator) in place, Mural thrombus of left ventricle, and Tortuous aorta ([address] has a past surgical history that includes Colon surgery; Cholecystectomy, open; Arm Debridement (10-26-10); Cardiac surgery; Colonoscopy (07/18/2016); pacemaker placement (Left, 2013); Upper gastrointestinal endoscopy (05/01/2017); Upper gastrointestinal endoscopy (06/27/2017); and Colonoscopy (11/20/2019). Restrictions  Restrictions/Precautions  Restrictions/Precautions: Fall Risk(High fall risk)  Required Braces or Orthoses?: No  Position Activity Restriction  Other position/activity restrictions: Pt arrived via EMS from home for c/c of AMS and R sided weakness, CT head showed \" Low-attenuation within the left thalamus as well as left posteromedial temporal lobe compatible with acute/subacute PCA territory infarctions. Subjective   General  Chart Reviewed:  Yes  Additional Pertinent Hx: CAD, GERD, Hematuria, HTN, Hyperlipidemia, CAD, CKD, Pacemaker, ICD, Collapsed Lung   Family / Caregiver Present: Yes(Mother, Cookie)  Referring Practitioner: Candace Holder MD  Diagnosis: CVA   Subjective  Subjective: Pt was lying in bed upon arrival, lethargic but cooperative   Vital Signs  Patient Currently in Pain: Other (comment)(no s/s pain during treatment)   Orientation  Orientation  Orientation Level: Disoriented X4(answers to name, opens eyes briefly)  Objective    ADL  UE Bathing: Maximum assistance  UE Dressing: Maximum assistance  LE Dressing: Dependent/Total        Balance  Standing Balance: Dependent/Total(maxA of 2 sit>stand, moda of 2 with maximal cues 2-3 side steps to [facility]) Bedside table used in front of patient /WB through forearms to facilitate upright posture)  Standing Balance  Time: ~3-5 minutes  Activity: side steps modA of 2, changing brief dependent in stance  Bed mobility  Bridging: Minimal assistance  Rolling to Left: Moderate assistance  Rolling to Right: Maximum assistance(due to pushing with LUE, LLE)  Supine to Sit: 2 Person assistance;Dependent/Total(maxA of 2)  Sit to Supine: Dependent/Total(of 2)  Scooting: Minimal assistance(CGA to Christa)           Coordination  Gross Motor: cues for attention to R side, ataxic movement attempts to thread RUE into gown. Hand over hand to place RUE in WB position (supinates, inattention)              Cognition  Overall Cognitive Status: Exceptions  Arousal/Alertness: Delayed responses to stimuli  Following Commands:  Follows one step commands with increased time  Attention Span: Difficulty dividing attention  Memory: Decreased recall of recent events  Safety Judgement: Decreased awareness of need for safety;Decreased awareness of need for assistance  Insights: Decreased awareness of deficits  Initiation: Requires cues for some  Sequencing: Requires cues for some  Cognition Comment: Pt requires cueing to utilize R side and assist needed to recall R UE during functional transfers and tasks        Plan   Plan  Times per week: 5-7x/week   Times per day: Daily  Plan weeks: until discharge   Current Treatment Recommendations: Strengthening, Balance Training, ROM, Functional Mobility Training, Neuromuscular Re-education, Safety Education & Training, Self-Care / ADL, Patient/Caregiver Education & Training, Cognitive/Perceptual Training    AM-PAC Score        [facility] Inpatient Daily Activity Raw Score: 11 (01/07/20 1613)  AM-PAC Inpatient ADL T-Scale Score : 29.04 (01/07/20 1613)  ADL Inpatient CMS 0-100% Score: 70.42 (01/07/20 1613)  ADL Inpatient CMS G-Code Modifier : CL (01/07/20 1613)    Goals  Short term goals  Time Frame for Short term goals: STG=LTG  Short term goal 1: Pt will complete grooming tasks with B UE with min A   Short term goal 2: Pt will complete functional transfer with min A with AD as needed  Short term goal 3: Pt will complete LB dressing tasks with min A with AD/AE as needed  Patient Goals   Patient goals : Return home        Therapy Time

## 2020-01-07 NOTE — PLAN OF CARE
Sleeping - no distress-NSR on tele- door closed for quiet- mother left for day taking cell phone so patient could not be disturbed with it - made RN aware she was available by phone call if needed- call light in reach - bed alarm on - camera in place to ensure safety

## 2020-01-07 NOTE — PLAN OF CARE
Hand off report- then became restless trying to get up confusion to place time situation -oriented to person only - encouraged to lay back down - now back down in bed resting -vitals stable- HR 60 bpm - NSR on tele -no signs of pain or distress- 02 sat 98% on room air -lungs CTA_ no edema- call light in reach - bed alarm on - camera in place to ensure safety

## 2020-01-07 NOTE — PROGRESS NOTES
Department of Internal Medicine  Progress Note      SUBJECTIVE: very lethargic secondary to Anxiolytic          OBJECTIVE:      PHYSICAL:   VITALS:  /66   Pulse 67   Temp 97.5 °F (36.4 °C) (Axillary)   Resp 16   Ht 6' (1.829 m)   Wt 205 lb (93 kg)   SpO2 99%   BMI 27.80 kg/m²   PULSE OXIMETRY RANGE: SpO2  Av %  Min: 95 %  Max: 99 %    Intake/Output Summary (Last 24 hours) at 2020 1831  Last data filed at 2020 1230  Gross per 24 hour   Intake 460 ml   Output 400 ml   Net 60 ml      CONSTITUTIONAL:  somnolent  NECK:  Supple, symmetrical, trachea midline, no adenopathy, thyroid symmetric, not enlarged and no tenderness, skin normal  LUNGS:  No increased work of breathing, good air exchange, clear to auscultation bilaterally, no crackles or wheezing  CARDIOVASCULAR:  regular rate and rhythm  ABDOMEN:  No scars, normal bowel sounds, soft, non-distended, non-tender, no masses palpated, no hepatosplenomegally  NEUROLOGIC:  Right hemiparesis  EDEMA: Normal    Data      CBC:   Lab Results   Component Value Date    WBC 8.3 2020    RBC 4.76 2020    HGB 14.9 2020    HCT 44.9 2020    MCV 94.3 2020    MCH 31.3 2020    MCHC 33.2 2020    RDW 14.1 2020     2020    MPV 8.6 2020     CMP:    Lab Results   Component Value Date     2020    K 4.5 2020     2020    CO2 28 2020    BUN 17 2020    CREATININE 1.2 2020    GFRAA >60 2020    GFRAA >60 04/10/2013    AGRATIO 1.1 2020    LABGLOM >60 2020    LABGLOM >60>60 2012    GLUCOSE 106 2020    PROT 7.4 2020    PROT 6.0 2012    LABALBU 3.8 2020    CALCIUM 9.6 2020    BILITOT 0.7 2020    ALKPHOS 82 2020    AST 16 2020    ALT 14 2020       ASSESSMENT      Patient Active Problem List   Diagnosis    Cellulitis    Hyponatremia    Arm pain    Essential hypertension    Ischemic chest pain ([address]    Mixed hyperlipidemia    ARF (acute renal failure) (HCC)    Automatic implantable cardioverter-defibrillator in situ    Tobacco abuse    Primary cardiomyopathy ([address]    CAD (coronary artery disease)    Ischemic chest pain (HCC)    [facility]ina [facility]    Chest pain    Cocaine abuse ([facility]    Ischemic cardiomyopathy    Hematochezia    Acute cerebrovascular accident (CVA) ([address]    Oropharyngeal dysphagia         PLAN  1. continue and PT and OT  2. Not candidate for anticoagulation secondary to noncompliance  3.  Awaiting disposition

## 2020-01-07 NOTE — PROGRESS NOTES
Endurance Training, Neuromuscular Re-education, Balance Training, Gait Training, Home Exercise Program, Functional Mobility Training, Safety Education & Training  Safety Devices  Type of devices:  All fall risk precautions in place, Call light within reach, Bed alarm in place, Left in bed, Telesitter in use, Nurse notified, Gait belt, Patient at risk for falls  Restraints  Initially in place: No     Therapy Time   Individual Concurrent Group Co-treatment   Time In       1536   Time Out       1601   Minutes       25        Timed Code Treatment Minutes:  25  Total Treatment Minutes:  [address], [address], [address] [facility]

## 2020-01-07 NOTE — PLAN OF CARE
Problem: Anxiety:  Goal: Level of anxiety will decrease  Description  Level of anxiety will decrease  Outcome: Ongoing     Problem: HEMODYNAMIC STATUS  Goal: Patient has stable vital signs and fluid balance  Outcome: Ongoing     Pt anxious and attempts to get OOB without assistance often. Pt sitting up in bed with friends at bedside. Pt very unsteady and needs maximum assistance with transfers. Pt was refusing xanax and wanting to leave hospital tonight, but pt's friends were able to talk to patient and calm patient somewhat. Pt then agreeable to take xanax for his anxiety. Pt given xanax at 2108; see MAR & all flowsheets. Pt is a high fall risk. Pt remains free from falls, throughout night. Bed alarm remains in place, door open. Camera remains at bedside for safety monitoring. Pt encouraged to use call light for needs throughout night; call light is within reach. Bed lock is in lowest position. Will continue to monitor throughout night.

## 2020-01-07 NOTE — PROGRESS NOTES
Speech Language Pathology Attempt    Hold therapy at this time per RN. RN reported that a code violet was called last night d/t combative behavior. Currently, pt is lethargic and not appropriate for tx. RN reported that PO intake has been held d/t pt's reduced level of alertness. Will re-attempt at a later date, as appropriate.      Sorin Cheng M.S. [id number] [facility]   Speech-Language Pathologist

## 2020-01-07 NOTE — PLAN OF CARE
PT/OT in and worked with patient - up at [address] at bedside- clean depends - clean gown - back in bed- was cooperative for PT/OT - now back asleep- camera in place to ensure safety - bed alarm on - call light in reach

## 2020-01-07 NOTE — PLAN OF CARE
Problem: SAFETY  Goal: Free from accidental physical injury  Outcome: Ongoing     Problem: PAIN  Goal: Patient's pain/discomfort is manageable  Outcome: Ongoing     Problem: Falls - Risk of:  Goal: Will remain free from falls  Description  Will remain free from falls  Outcome: Ongoing     Problem: HEMODYNAMIC STATUS  Goal: Patient has stable vital signs and fluid balance  Outcome: Ongoing     Vitals:    01/07/20 0149   BP: 131/74   Pulse: 65   Resp: 14   Temp: 97.1 °F (36.2 °C)   SpO2: 96%     Pt had IM Ativan per MD ordered at 2314; code violet was called d/t pt combativeness, restlessness, & agitation. Pt is a high fall risk. Pt remains free from falls, throughout night. Camera remains at bedside for safety monitoring. Bed alarm remains in place, door open. Pt encouraged to use call light for needs throughout night; call light is within reach. Bed lock is in lowest position. VSS upon recheck at [address] & pt denied pain. Will continue to monitor throughout night.

## 2020-01-08 PROCEDURE — 2580000003 HC RX 258: Performed by: INTERNAL MEDICINE

## 2020-01-08 PROCEDURE — 6360000002 HC RX W HCPCS: Performed by: INTERNAL MEDICINE

## 2020-01-08 PROCEDURE — 2060000000 HC ICU INTERMEDIATE R&B

## 2020-01-08 PROCEDURE — 94760 N-INVAS EAR/PLS OXIMETRY 1: CPT

## 2020-01-08 PROCEDURE — 97530 THERAPEUTIC ACTIVITIES: CPT

## 2020-01-08 PROCEDURE — 97535 SELF CARE MNGMENT TRAINING: CPT

## 2020-01-08 RX ORDER — QUETIAPINE FUMARATE 25 MG/1
50 TABLET, FILM COATED ORAL NIGHTLY
Status: DISCONTINUED | OUTPATIENT
Start: 2020-01-08 | End: 2020-01-09 | Stop reason: HOSPADM

## 2020-01-08 RX ADMIN — Medication 10 ML: at 09:20

## 2020-01-08 RX ADMIN — LORAZEPAM 0.5 MG: 2 INJECTION INTRAMUSCULAR; INTRAVENOUS at 15:20

## 2020-01-08 RX ADMIN — LORAZEPAM 0.5 MG: 2 INJECTION INTRAMUSCULAR; INTRAVENOUS at 00:07

## 2020-01-08 ASSESSMENT — PAIN SCALES - GENERAL
PAINLEVEL_OUTOF10: 0

## 2020-01-08 NOTE — PROGRESS NOTES
Pt attempting to get out of bed this morning, yelling for cigarettes and/or a joint. Pt remains anxious and impulsive. Given lorazepam x1 for anxiety and agitation per order. Will continue to monitor pt.

## 2020-01-08 NOTE — PLAN OF CARE
Problem: SAFETY  Goal: Free from accidental physical injury  1/8/2020 1202 by Pop Chinchilla RN  Outcome: Ongoing  1/8/2020 0435 by Alvarado Gamez RN  Outcome: Ongoing     Problem: PAIN  Goal: Patient's pain/discomfort is manageable  1/8/2020 1202 by Pop Chinchilla RN  Outcome: Ongoing  1/8/2020 0435 by Alvarado Gamez RN  Outcome: Ongoing     Problem: KNOWLEDGE DEFICIT  Goal: Patient/S.O. demonstrates understanding of disease process, treatment plan, medications, and discharge instructions.   1/8/2020 1202 by Pop Chinchilla RN  Outcome: Ongoing  1/8/2020 0435 by Alvarado Gamez RN  Outcome: Ongoing

## 2020-01-08 NOTE — PLAN OF CARE
Problem: PAIN  Goal: Patient's pain/discomfort is manageable  Outcome: Ongoing   No complaints of pain from the patient. Pt does complain of right arm being \"tied down\" to something. Pt assured R. Arm was not tied down to anything and repositioned. Will continue to monitor. Problem: SKIN INTEGRITY  Goal: Skin integrity is maintained or improved  Outcome: Ongoing   Pt aware of when he needs to void. Skin is c/d/I. Will continue to monitor. Problem: Falls - Risk of:  Goal: Will remain free from falls  Description  Will remain free from falls  Outcome: Ongoing   Pt remains free from falls. Pt is asleep in bed with 4/4 side rails up, bed in lowest position with brakes locked and bedside table and call light within reach. Bed alarm on. Will continue to monitor pt. Pt in low bed. Camera in room   Problem: HEMODYNAMIC STATUS  Goal: Patient has stable vital signs and fluid balance  Outcome: Ongoing   Vitals are stable. Problem: Anxiety:  Goal: Level of anxiety will decrease  Description  Level of anxiety will decrease  Outcome: Ongoing   Pt anxious/impulsive/agitated early am, given IM ativan per order. Pt back to sleep w/ RR >10. No sign of distress.

## 2020-01-08 NOTE — PROGRESS NOTES
Shift assessment completed. Vitals are stable. Pt responds to speech, oriented to self. Respirations are even and unlabored, pt resting with no signs of distress noted. Low bed in room, bed alarm on, camera at bedside. Will continue to monitor pt.

## 2020-01-08 NOTE — PROGRESS NOTES
Margo Maynard  1/8/2020  [id number]    Chief Complaint: <principal problem not specified>    Subjective:   Required ativan again overnight due to agitation. Somnolent this am. Worked again with therapy yesterday. ROS: No CP, SOB, dyspnea    Objective:  Patient Vitals for the past 24 hrs:   BP Temp Temp src Pulse Resp SpO2 Weight   01/08/20 0737 122/83 98 °F (36.7 °C) Temporal 79 18 94 % 206 lb 6.4 oz (93.6 kg)   01/08/20 0609 120/80 98.1 °F (36.7 °C) Axillary 77 16 95 % --   01/08/20 0003 (!) 137/91 97.7 °F (36.5 °C) Axillary 69 16 96 % --   01/07/20 2130 117/83 98 °F (36.7 °C) Axillary 71 16 96 % --   01/07/20 1516 103/66 97.5 °F (36.4 °C) Axillary 67 16 99 % --   01/07/20 1230 109/82 97.5 °F (36.4 °C) Axillary 65 16 96 % --     Gen: No distress, pleasant. Resting in bed  HEENT: Normocephalic, atraumatic. CV: No audible murmurs, well perfused extremities  Resp: No respiratory distress. No increased WOB  Abd: Soft, nontender nondistended  Ext: No edema. Neuro: somnolent but arousable with verbal cues, appropriately interactive. Laboratory data: Available via EMR. Therapy progress:  PT  Position Activity Restriction  Other position/activity restrictions: Pt arrived via EMS from home for c/c of AMS and R sided weakness, CT head showed \" Low-attenuation within the left thalamus as well as left posteromedial temporal lobe compatible with acute/subacute PCA territory infarctions. Objective     Sit to Stand: Dependent/Total  Stand to sit: Dependent/Total  Device: No Device  Assistance: Dependent/Total(Mod Ax2)  Distance: 2-3'  OT  PT Equipment Recommendations  Equipment Needed: No     Assessment        SLP                Body mass index is 27.99 kg/m².     Assessment:  Patient Active Problem List   Diagnosis    Cellulitis    Hyponatremia    Arm pain    Essential hypertension    Ischemic chest pain (HCC)    Mixed hyperlipidemia    ARF (acute renal failure) (HCC)    Automatic implantable cardioverter-defibrillator in situ    Tobacco abuse    [facility]    CAD (coronary artery disease)    Ischemic chest pain (HCC)    Unstable angina ([facility]    Chest pain    Cocaine abuse ([facility]    Ischemic cardiomyopathy    Hematochezia    Acute cerebrovascular accident (CVA) ([facility]    Oropharyngeal dysphagia       Plan:   Acute left thalamic and temporal lobe infarcts: PT/OT for hemiplegia  Agitation: environmental precautions. Question withdrawal with stimulant abuse vs post stroke confusion  Dysphagia: SLP  HLD  HTN  CAD  Cardiomyopathy  H/O Cocaine abuse     Dispo: Patient is an appropriate ARU candidate. Precert remains pending. We will continue to follow. Bernita Lundborg, MD 1/8/2020, 11:23 AM    * This document was created using dictation software. While all precautions were taken to ensure accuracy, errors may have occurred. Please disregard any typographical errors.

## 2020-01-08 NOTE — PROGRESS NOTES
Occupational Therapy  Facility/Department: [address]  Daily Treatment Note  NAME: Anne-Marie Sigala  : 1960  MRN: [id number]    Date of Service: 2020    Discharge Recommendations:Kedar Lomas scored a  on the AM-PAC ADL Inpatient form. Current research shows that an AM-PAC score of 17 or less is typically not associated with a discharge to the patient's home setting. Based on the patients AM-PAC score and their current ADL deficits, it is recommended that the patient have 5-7 sessions per week of Occupational Therapy at d/c to increase the patients independence. IP Rehab       Assessment   Performance deficits / Impairments: Decreased functional mobility ; Decreased ROM; Decreased strength;Decreased sensation;Decreased balance;Decreased ADL status; Decreased safe awareness;Decreased coordination  Treatment Diagnosis: CVA   Prognosis: Good  OT Education: OT Role;Plan of Care;Transfer Training  REQUIRES OT FOLLOW UP: Yes  Activity Tolerance  Activity Tolerance: Patient limited by fatigue;Treatment limited secondary to decreased cognition  Activity Tolerance: Pt eager to complete OOB activity initially however has pt aware of defcitis and fatigue increased pt eager to return to bed. Pt requires cueing to attend to R side and bring R UE/LE with functional transfers. Safety Devices  Safety Devices in place: Yes  Type of devices: All fall risk precautions in place;Nurse notified; Left in bed;Call light within reach; Bed alarm in place; Patient at risk for falls         Patient Diagnosis(es): The encounter diagnosis was Cerebrovascular accident (CVA), unspecified mechanism ([address] has a past medical history of CAD (coronary artery disease), Chronic kidney disease, Collapsed lung, GERD (gastroesophageal reflux disease), Hematuria, Hyperlipidemia, Hypertension, ICD (implantable cardioverter-defibrillator) in place, Mural thrombus of left ventricle, and Tortuous aorta ([address]    has a past surgical history that includes Colon surgery; Cholecystectomy, open; Arm Debridement (10-26-10); Cardiac surgery; Colonoscopy (07/18/2016); pacemaker placement (Left, 2013); Upper gastrointestinal endoscopy (05/01/2017); Upper gastrointestinal endoscopy (06/27/2017); and Colonoscopy (11/20/2019). Restrictions  Restrictions/Precautions  Restrictions/Precautions: Fall Risk(High fall risk)  Required Braces or Orthoses?: No  Position Activity Restriction  Other position/activity restrictions: Pt arrived via EMS from home for c/c of AMS and R sided weakness, CT head showed \" Low-attenuation within the left thalamus as well as left posteromedial temporal lobe compatible with acute/subacute PCA territory infarctions. Subjective   General  Chart Reviewed: Yes  Additional Pertinent Hx: CAD, GERD, Hematuria, HTN, Hyperlipidemia, CAD, CKD, Pacemaker, ICD, Collapsed Lung   Family / Caregiver Present: Yes(Mother, Cookie)  Referring Practitioner: Malaika Ruiz MD  Diagnosis: CVA   Subjective  Subjective: Patient supine in bed, lethargic, but easily awakened and agreeable  Pre Treatment Pain Screening  Intervention List: Patient able to continue with treatment  Vital Signs  Patient Currently in Pain: Denies   Orientation  Orientation  Orientation Level: Oriented to person  Objective    ADL  Feeding: Setup(Patient seated EOB, this writer cut up breakfast food, patient able to feed self with L hand (pt is L dominant) and WB assist/balance RUE)  LE Dressing: Maximum assistance        Standing Balance  Time: Patient tolerated sitting EOB for self feeding, fatigued quickly, but cooperative and calm throughout session. Patient more verbally interactive this date. Patient states \"long recovery\" during feeding  Bed mobility  Rolling to Left: Minimal assistance  Rolling to Right: Minimal assistance  Supine to Sit: Dependent/Total(ModA of 2)  Sit to Supine:  Moderate assistance  Scooting: Contact guard assistance

## 2020-01-09 ENCOUNTER — HOSPITAL ENCOUNTER (INPATIENT)
Age: 60
LOS: 13 days | Discharge: NURSING, REHAB OR LONG-TERM CARE | DRG: 057 | End: 2020-01-22
Attending: PHYSICAL MEDICINE & REHABILITATION | Admitting: PHYSICAL MEDICINE & REHABILITATION
Payer: COMMERCIAL

## 2020-01-09 VITALS
RESPIRATION RATE: 16 BRPM | TEMPERATURE: 97.9 F | SYSTOLIC BLOOD PRESSURE: 106 MMHG | DIASTOLIC BLOOD PRESSURE: 73 MMHG | HEIGHT: 72 IN | OXYGEN SATURATION: 98 % | BODY MASS INDEX: 27.96 KG/M2 | WEIGHT: 206.4 LBS | HEART RATE: 56 BPM

## 2020-01-09 PROBLEM — F32.9 CURRENT EPISODE OF MAJOR DEPRESSIVE DISORDER WITHOUT PRIOR EPISODE: Status: ACTIVE | Noted: 2020-01-09

## 2020-01-09 PROBLEM — F06.30 ORGANIC MOOD DISORDER: Status: ACTIVE | Noted: 2020-01-09

## 2020-01-09 PROBLEM — I63.9 ACUTE ISCHEMIC STROKE (HCC): Status: ACTIVE | Noted: 2020-01-09

## 2020-01-09 PROCEDURE — 1280000000 HC REHAB R&B

## 2020-01-09 PROCEDURE — 6370000000 HC RX 637 (ALT 250 FOR IP): Performed by: PHYSICAL MEDICINE & REHABILITATION

## 2020-01-09 PROCEDURE — 92526 ORAL FUNCTION THERAPY: CPT

## 2020-01-09 PROCEDURE — 2580000003 HC RX 258: Performed by: INTERNAL MEDICINE

## 2020-01-09 PROCEDURE — 97112 NEUROMUSCULAR REEDUCATION: CPT

## 2020-01-09 PROCEDURE — 97530 THERAPEUTIC ACTIVITIES: CPT

## 2020-01-09 PROCEDURE — 6370000000 HC RX 637 (ALT 250 FOR IP): Performed by: INTERNAL MEDICINE

## 2020-01-09 PROCEDURE — 97129 THER IVNTJ 1ST 15 MIN: CPT

## 2020-01-09 PROCEDURE — 92507 TX SP LANG VOICE COMM INDIV: CPT

## 2020-01-09 RX ORDER — ALPRAZOLAM 0.25 MG/1
0.25 TABLET ORAL EVERY 6 HOURS PRN
Status: DISCONTINUED | OUTPATIENT
Start: 2020-01-09 | End: 2020-01-12 | Stop reason: ALTCHOICE

## 2020-01-09 RX ORDER — PANTOPRAZOLE SODIUM 40 MG/1
40 TABLET, DELAYED RELEASE ORAL DAILY
Status: CANCELLED | OUTPATIENT
Start: 2020-01-10

## 2020-01-09 RX ORDER — ATORVASTATIN CALCIUM 80 MG/1
80 TABLET, FILM COATED ORAL DAILY
Status: CANCELLED | OUTPATIENT
Start: 2020-01-10

## 2020-01-09 RX ORDER — SODIUM CHLORIDE 0.9 % (FLUSH) 0.9 %
10 SYRINGE (ML) INJECTION EVERY 12 HOURS SCHEDULED
Status: DISCONTINUED | OUTPATIENT
Start: 2020-01-09 | End: 2020-01-09

## 2020-01-09 RX ORDER — FLUOXETINE HYDROCHLORIDE 20 MG/1
20 CAPSULE ORAL DAILY
Status: DISCONTINUED | OUTPATIENT
Start: 2020-01-10 | End: 2020-01-22 | Stop reason: HOSPADM

## 2020-01-09 RX ORDER — QUETIAPINE FUMARATE 25 MG/1
50 TABLET, FILM COATED ORAL NIGHTLY
Status: DISCONTINUED | OUTPATIENT
Start: 2020-01-09 | End: 2020-01-22 | Stop reason: HOSPADM

## 2020-01-09 RX ORDER — LORAZEPAM 2 MG/ML
0.5 INJECTION INTRAMUSCULAR EVERY 6 HOURS PRN
Status: CANCELLED | OUTPATIENT
Start: 2020-01-09

## 2020-01-09 RX ORDER — ALPRAZOLAM 0.25 MG/1
0.25 TABLET ORAL EVERY 6 HOURS PRN
Status: CANCELLED | OUTPATIENT
Start: 2020-01-09

## 2020-01-09 RX ORDER — CLOPIDOGREL BISULFATE 75 MG/1
75 TABLET ORAL DAILY
Status: DISCONTINUED | OUTPATIENT
Start: 2020-01-10 | End: 2020-01-22 | Stop reason: HOSPADM

## 2020-01-09 RX ORDER — NICOTINE 21 MG/24HR
1 PATCH, TRANSDERMAL 24 HOURS TRANSDERMAL DAILY
Qty: 30 PATCH | Refills: 0 | Status: ON HOLD | OUTPATIENT
Start: 2020-01-10 | End: 2020-01-21 | Stop reason: HOSPADM

## 2020-01-09 RX ORDER — PANTOPRAZOLE SODIUM 40 MG/1
40 TABLET, DELAYED RELEASE ORAL DAILY
Status: DISCONTINUED | OUTPATIENT
Start: 2020-01-10 | End: 2020-01-22 | Stop reason: HOSPADM

## 2020-01-09 RX ORDER — HYDRALAZINE HYDROCHLORIDE 25 MG/1
50 TABLET, FILM COATED ORAL EVERY 8 HOURS PRN
Status: DISCONTINUED | OUTPATIENT
Start: 2020-01-09 | End: 2020-01-22 | Stop reason: HOSPADM

## 2020-01-09 RX ORDER — CLOPIDOGREL BISULFATE 75 MG/1
75 TABLET ORAL DAILY
Status: CANCELLED | OUTPATIENT
Start: 2020-01-10

## 2020-01-09 RX ORDER — FLUOXETINE HYDROCHLORIDE 20 MG/1
20 CAPSULE ORAL DAILY
Status: CANCELLED | OUTPATIENT
Start: 2020-01-10

## 2020-01-09 RX ORDER — FLUOXETINE HYDROCHLORIDE 20 MG/1
20 CAPSULE ORAL DAILY
Qty: 30 CAPSULE | Refills: 0 | Status: SHIPPED | OUTPATIENT
Start: 2020-01-10 | End: 2020-07-09

## 2020-01-09 RX ORDER — QUETIAPINE FUMARATE 25 MG/1
50 TABLET, FILM COATED ORAL NIGHTLY
Status: CANCELLED | OUTPATIENT
Start: 2020-01-09

## 2020-01-09 RX ORDER — ATORVASTATIN CALCIUM 80 MG/1
80 TABLET, FILM COATED ORAL DAILY
Status: DISCONTINUED | OUTPATIENT
Start: 2020-01-10 | End: 2020-01-22 | Stop reason: HOSPADM

## 2020-01-09 RX ORDER — NICOTINE 21 MG/24HR
1 PATCH, TRANSDERMAL 24 HOURS TRANSDERMAL DAILY
Status: DISCONTINUED | OUTPATIENT
Start: 2020-01-10 | End: 2020-01-22 | Stop reason: HOSPADM

## 2020-01-09 RX ORDER — SODIUM CHLORIDE 0.9 % (FLUSH) 0.9 %
10 SYRINGE (ML) INJECTION PRN
Status: CANCELLED | OUTPATIENT
Start: 2020-01-09

## 2020-01-09 RX ORDER — ONDANSETRON 4 MG/1
4 TABLET, ORALLY DISINTEGRATING ORAL EVERY 8 HOURS PRN
Status: CANCELLED | OUTPATIENT
Start: 2020-01-09

## 2020-01-09 RX ORDER — ASPIRIN 81 MG/1
81 TABLET, CHEWABLE ORAL DAILY
Status: DISCONTINUED | OUTPATIENT
Start: 2020-01-10 | End: 2020-01-22 | Stop reason: HOSPADM

## 2020-01-09 RX ORDER — SODIUM CHLORIDE 0.9 % (FLUSH) 0.9 %
10 SYRINGE (ML) INJECTION PRN
Status: DISCONTINUED | OUTPATIENT
Start: 2020-01-09 | End: 2020-01-09

## 2020-01-09 RX ORDER — HYDRALAZINE HYDROCHLORIDE 25 MG/1
50 TABLET, FILM COATED ORAL EVERY 8 HOURS PRN
Status: CANCELLED | OUTPATIENT
Start: 2020-01-09

## 2020-01-09 RX ORDER — QUETIAPINE FUMARATE 50 MG/1
50 TABLET, FILM COATED ORAL NIGHTLY
Qty: 30 TABLET | Refills: 0 | Status: SHIPPED | OUTPATIENT
Start: 2020-01-09 | End: 2020-07-09

## 2020-01-09 RX ORDER — ASPIRIN 81 MG/1
81 TABLET, CHEWABLE ORAL DAILY
Status: CANCELLED | OUTPATIENT
Start: 2020-01-10

## 2020-01-09 RX ORDER — CARVEDILOL 6.25 MG/1
12.5 TABLET ORAL 2 TIMES DAILY
Status: CANCELLED | OUTPATIENT
Start: 2020-01-09

## 2020-01-09 RX ORDER — SODIUM CHLORIDE 0.9 % (FLUSH) 0.9 %
10 SYRINGE (ML) INJECTION EVERY 12 HOURS SCHEDULED
Status: CANCELLED | OUTPATIENT
Start: 2020-01-09

## 2020-01-09 RX ORDER — ONDANSETRON 4 MG/1
4 TABLET, ORALLY DISINTEGRATING ORAL EVERY 8 HOURS PRN
Status: DISCONTINUED | OUTPATIENT
Start: 2020-01-09 | End: 2020-01-22 | Stop reason: HOSPADM

## 2020-01-09 RX ORDER — DIPHENHYDRAMINE HCL 25 MG
25 TABLET ORAL EVERY 6 HOURS PRN
Status: CANCELLED | OUTPATIENT
Start: 2020-01-09

## 2020-01-09 RX ORDER — LORAZEPAM 2 MG/ML
0.5 INJECTION INTRAMUSCULAR EVERY 6 HOURS PRN
Status: DISCONTINUED | OUTPATIENT
Start: 2020-01-09 | End: 2020-01-22 | Stop reason: HOSPADM

## 2020-01-09 RX ORDER — CLOPIDOGREL BISULFATE 75 MG/1
75 TABLET ORAL DAILY
Qty: 30 TABLET | Refills: 0 | Status: SHIPPED | OUTPATIENT
Start: 2020-01-10 | End: 2020-07-09

## 2020-01-09 RX ORDER — NICOTINE 21 MG/24HR
1 PATCH, TRANSDERMAL 24 HOURS TRANSDERMAL DAILY
Status: CANCELLED | OUTPATIENT
Start: 2020-01-10

## 2020-01-09 RX ORDER — CARVEDILOL 6.25 MG/1
12.5 TABLET ORAL 2 TIMES DAILY
Status: DISCONTINUED | OUTPATIENT
Start: 2020-01-09 | End: 2020-01-10

## 2020-01-09 RX ORDER — DIPHENHYDRAMINE HCL 25 MG
25 TABLET ORAL EVERY 6 HOURS PRN
Status: DISCONTINUED | OUTPATIENT
Start: 2020-01-09 | End: 2020-01-22 | Stop reason: HOSPADM

## 2020-01-09 RX ADMIN — CARVEDILOL 12.5 MG: 6.25 TABLET, FILM COATED ORAL at 11:08

## 2020-01-09 RX ADMIN — ATORVASTATIN CALCIUM 80 MG: 80 TABLET, FILM COATED ORAL at 11:09

## 2020-01-09 RX ADMIN — ASPIRIN 81 MG: 81 TABLET, COATED ORAL at 11:08

## 2020-01-09 RX ADMIN — PANTOPRAZOLE SODIUM 40 MG: 40 TABLET, DELAYED RELEASE ORAL at 11:08

## 2020-01-09 RX ADMIN — MAGNESIUM HYDROXIDE 30 ML: 400 SUSPENSION ORAL at 17:03

## 2020-01-09 RX ADMIN — CARVEDILOL 12.5 MG: 6.25 TABLET, FILM COATED ORAL at 20:44

## 2020-01-09 RX ADMIN — Medication 10 ML: at 11:13

## 2020-01-09 RX ADMIN — FLUOXETINE 20 MG: 20 CAPSULE ORAL at 11:09

## 2020-01-09 RX ADMIN — CLOPIDOGREL 75 MG: 75 TABLET, FILM COATED ORAL at 11:08

## 2020-01-09 ASSESSMENT — PAIN SCALES - GENERAL
PAINLEVEL_OUTOF10: 0

## 2020-01-09 NOTE — PROGRESS NOTES
[facility] She states [facility] is waiting on a discharge orders.

## 2020-01-09 NOTE — PROGRESS NOTES
aspiration(Ongoing 1/9/20)  4.) Pt will improve oral motor function via bolus control exercises 5/5(Ongoing 1/9/20)    Speech Language Treatment:  Impressions: Pt much more awake and verbally responsive to direct questions. Pt presents with flat affect, poor eye contact in conversation, poor insight/awareness of deficits, and reduced executive function for cognitively integrated problem solving/task completion. Therefore, cognitive goals also added this date. Speech Language STG, addressed this date: 1. Pt will improve speech intelligibility in connected speech via graded tasks to 90%  · Mild reduced articulatory precision for single word production  · Poor self monitoring with cues required to reduce rate in connected speech in order to improve intelligibility  · 80% intelligible for single words and short phrases   2. Pt will improve auditory processing/comprehension of commands and questions to 80%, via graded tasks   · Poor sustained attention to person/task impacting comprehension of 2+ unit complex information  · Poor eye contact to speaker with frequent redirection required  NEW GOALS:  3. Pt will improve verbal expression/pragmatic skills for affect, eye contact, turn taking and appropriate content in conversation via graded tasks to 80%  4. Pt will improve executive function for multifactor task completion via graded tasks to 80%  5. Ongoing assessment of high level integrated cognitive linguistic function with further treatment recommendations as indicated. Patient/Family Education:Education given to the Pt and nurse, who verbalized understanding    Assessment: Patient progressing toward goals    Plan: Continue as per plan of care    Discharge Recommendations: Pt will benefit from continued skilled Speech Therapy for Speech and Dysphagia services, prior to returning home.     Treatment time  Timed Code Treatment Minutes: 15 min  Total Treatment time:40 min    Mateo [id number]   -

## 2020-01-09 NOTE — PLAN OF CARE
Problem: SAFETY  Goal: Free from accidental physical injury  1/9/2020 1737 by Price Pierce RN  Outcome: Completed  Note:   Pt remains free from falls. Safety precautions in place. Bed in lowest position, Matts down on side of bed, bed wheels locked, call light with in reach, bed alarm on, yellow blanket in place, fall risk wrist band on, SAFE outside of doorway. Will continue to monitor. Problem: PAIN  Goal: Patient's pain/discomfort is manageable  1/9/2020 1737 by Price Pierce RN  Outcome: Completed  Note:   Patient has not had any complaint of pain this shift. Patient encouraged to inform nurse if any pain noted. Problem: SKIN INTEGRITY  Goal: Skin integrity is maintained or improved  1/9/2020 1737 by Price Pierce RN  Outcome: Completed     Problem: KNOWLEDGE DEFICIT  Goal: Patient/S.O. demonstrates understanding of disease process, treatment plan, medications, and discharge instructions. 1/9/2020 1737 by Price Pierce RN  Outcome: Completed  Note:   Patient has stated a couple times this shift that he wants to get out of here. Patient was re-oriented that he was in the Hospital for a stroke and his entire R side is week and he needs to go to acute rehab for Therapy. Patient stated ok and went back to sleep both times. Problem: DISCHARGE BARRIERS  Goal: Patient's continuum of care needs are met  1/9/2020 1737 by Price Pierce RN  Outcome: Completed     Problem: Falls - Risk of:  Goal: Will remain free from falls  Description  Will remain free from falls  1/9/2020 1737 by Price Pierce RN  Outcome: Completed     Problem: Falls - Risk of:  Goal: Absence of physical injury  Description  Absence of physical injury  1/9/2020 1737 by Price Pierce RN  Outcome: Completed     Problem: HEMODYNAMIC STATUS  Goal: Patient has stable vital signs and fluid balance  1/9/2020 1737 by Price Pierce RN  Outcome: Completed  Note:   Vital signs stable. Patient took meds as ordered whole in [employer]. Problem: ACTIVITY INTOLERANCE/IMPAIRED MOBILITY  Goal: Mobility/activity is maintained at optimum level for patient  1/9/2020 1737 by Aiyana Sharpe RN  Outcome: Completed     Problem: Risk for Impaired Skin Integrity  Goal: Tissue integrity - skin and mucous membranes  Description  Structural intactness and normal physiological function of skin and  mucous membranes. 1/9/2020 1737 by Aiyana Sharpe RN  Outcome: Completed     Problem: Respiratory:  Goal: Absence of aspiration  Description  Absence of aspiration  1/9/2020 1737 by Aiyana Sharpe RN  Outcome: Completed  Note:   Patient encouraged to eat slower and to take his time while drinking fluids. Patient has done well with eating today. No signs of aspiration noted this shift.       Problem: Anxiety:  Goal: Level of anxiety will decrease  Description  Level of anxiety will decrease  1/9/2020 1737 by Aiyana Sharpe RN  Outcome: Completed

## 2020-01-09 NOTE — DISCHARGE INSTR - COC
Continuity of Care Form    Patient Name: Akanksha Coy   :  1960  MRN:  [id number]    Admit date:  1/3/2020  Discharge date:  2020    Code Status Order: Full Code   Advance Directives:   Advance Care Flowsheet Documentation     Date/Time Healthcare Directive Type of Healthcare Directive Copy in [address] Agent's Name Healthcare Agent's Phone Number    [phone]  No, patient does not have an advance directive for healthcare treatment -- -- -- -- --          Admitting Physician:  Tawnya Elias MD  PCP: Tawnya Elias MD    Discharging Nurse: DAGO Fowler 702 Unit/Room#: 3XE-6396/4948-71  Discharging Unit Phone Number: [phone]    Emergency Contact:   Extended Emergency Contact Information  Primary Emergency Contact: [address] [facility] [address] Phone: [phone]  Relation: Parent  Secondary Emergency Contact: Elliot Simmonds [place] of [address] Phone: [phone]  Mobile Phone: [phone]  Relation: Parent    Past Surgical History:  Past Surgical History:   Procedure Laterality Date    ARM DEBRIDEMENT  10-26-10    incision and drainage bilateral arms    CARDIAC SURGERY      three stents    CHOLECYSTECTOMY, OPEN      COLON SURGERY      COLONOSCOPY  2016    COLONOSCOPY  2019    COLONOSCOPY POLYPECTOMY SNARE/COLD BIOPSY performed by Josh Manzo MD at [address] [place] [address] Left 2013    defibrillator    UPPER GASTROINTESTINAL ENDOSCOPY  2017    esophageal erosions, esophageal dilation, biopsy    UPPER GASTROINTESTINAL ENDOSCOPY  2017    dilated       Immunization History: There is no immunization history on file for this patient.     Active Problems:  Patient Active Problem List   Diagnosis Code    Cellulitis L03.90    Hyponatremia E87.1    Arm pain M79.603    Essential hypertension I10    Ischemic chest pain (HCC) I20.9    Mixed hyperlipidemia E78.2    ARF (acute no  Last Modified Barium Swallow with Video (Video Swallowing Test): not done    Treatments at the Time of Hospital Discharge:   Respiratory Treatments: none  Oxygen Therapy:  is not on home oxygen therapy. Ventilator:    - No ventilator support    Rehab Therapies: Physical Therapy, Occupational Therapy and Speech/Language Therapy  Weight Bearing Status/Restrictions: No weight bearing restirctions  Other Medical Equipment (for information only, NOT a DME order):  walker and hospital bed  Other Treatments: none    Patient's personal belongings (please select all that are sent with patient):  Glasses    RN SIGNATURE:  Electronically signed by Adam Chambers RN on 1/9/20 at 6:25 PM    CASE MANAGEMENT/SOCIAL WORK SECTION    Inpatient Status Date: ***    Readmission Risk Assessment Score:  Readmission Risk              Risk of Unplanned Readmission:        18           Discharging to Facility/ Agency   · Name:   · Address:  · Phone:  · Fax:    Dialysis Facility (if applicable)   · Name:  · Address:  · Dialysis Schedule:  · Phone:  · Fax:    / signature: {Esignature:[id number]}    PHYSICIAN SECTION    Prognosis: Good    Condition at Discharge: Stable    Rehab Potential (if transferring to Rehab): Good    Recommended Labs or Other Treatments After Discharge: Pt/Ot/speech therapy    Physician Certification: I certify the above information and transfer of Larena Barthel  is necessary for the continuing treatment of the diagnosis listed and that he requires Acute Rehab for less 30 days.      Update Admission H&P: No change in H&P    PHYSICIAN SIGNATURE:  Electronically signed by RAFAEL Daley CNP on 1/9/20 at 4:40 PM

## 2020-01-09 NOTE — PROGRESS NOTES
Hypertension, ICD (implantable cardioverter-defibrillator) in place, Mural thrombus of left ventricle, and Tortuous aorta ([facility] [address] has a past surgical history that includes Colon surgery; Cholecystectomy, open; Arm Debridement (10-26-10); Cardiac surgery; Colonoscopy (07/18/2016); pacemaker placement (Left, 2013); Upper gastrointestinal endoscopy (05/01/2017); Upper gastrointestinal endoscopy (06/27/2017); and Colonoscopy (11/20/2019). Restrictions  Restrictions/Precautions  Restrictions/Precautions: Fall Risk(High fall risk)  Required Braces or Orthoses?: No  Position Activity Restriction  Other position/activity restrictions: Pt arrived via EMS from home for c/c of AMS and R sided weakness, CT head showed \" Low-attenuation within the left thalamus as well as left posteromedial temporal lobe compatible with acute/subacute PCA territory infarctions. Subjective   General  Chart Reviewed: Yes  Patient assessed for rehabilitation services?: Yes  Additional Pertinent Hx: CAD, GERD, Hematuria, HTN, Hyperlipidemia, CAD, CKD, Pacemaker, ICD, Collapsed Lung   Family / Caregiver Present: No  Referring Practitioner: Caron Kay MD  Diagnosis: CVA   Subjective  Subjective: Patient supine in bed, awakens easily, agreeable to treatment  Pre Treatment Pain Screening  Intervention List: Patient able to continue with treatment  Vital Signs  Patient Currently in Pain: Denies   Orientation  Orientation  Overall Orientation Status: Impaired  Orientation Level: Oriented to person(Able to state month this date)  Objective    ADL  Feeding: None  Additional Comments: Patient lethargic initially, attempts made for stance and mobility prior to ADLs.  Patient became fatigued and returned self to supine prior to ADL performance        Balance  Standing Balance: Dependent/Total(modA of 2 (static stance at EOB, patient leaning R, pushing with LLE) Attempts to cue patient to side step to L, patient took lateral step with L, returned LLE to midline)  Standing Balance  Time: Patient tolerated ~3-5 minutes stance at bedside, using bedside table to promote WB through B elbows and BUE extended intermittently. Difficulty engaging in side steps, patient states \"nope\" and sits EOB. Patient performed lateral scooting Christa and verbal cues to [facility] (patient initially began returning to supine directly after sitting EOB, returned to sitting and performed lateral scoot)  Bed mobility  Bridging: Stand by assistance(x 5 reps, moderate cues for activation)  Supine to Sit: Dependent/Total(modA of 2)  Sit to Supine: Moderate assistance  Scooting: Minimal assistance(with vcs lateral to HOB x 5)           Coordination  Gross Motor: cues for attention to R side, ataxic movement attempts to thread RUE into gown. Hand over hand to place RUE in WB position (supinates, inattention)              Cognition  Overall Cognitive Status: Exceptions  Arousal/Alertness: Delayed responses to stimuli  Following Commands: Follows one step commands with increased time; Follows one step commands with repetition  Attention Span: Difficulty dividing attention  Memory: Decreased recall of recent events  Safety Judgement: Decreased awareness of need for safety;Decreased awareness of need for assistance  Insights: Decreased awareness of deficits  Initiation: Requires cues for some  Sequencing: Requires cues for some  Cognition Comment: Pt requires cueing to utilize R side and assist needed to recall R UE during functional transfers and tasks      Perception  Overall Perceptual Status: Impaired  Unilateral Attention: Cues to attend to right side of body;Cues to maintain midline in standing  Initiation: Cues to initiate tasks                                   Plan   Plan  Times per week: 5-7x/week   Times per day: Daily  Plan weeks: until discharge   Current Treatment Recommendations: Strengthening, Balance Training, ROM, Functional Mobility Training, Neuromuscular Re-education, Safety Education & Training, Self-Care / ADL, Patient/Caregiver Education & Training, Cognitive/Perceptual Training    AM-PAC Score        AM-PAC Inpatient Daily Activity Raw Score: 11 (01/09/20 1120)  AM-PAC Inpatient ADL T-Scale Score : 29.04 (01/09/20 1120)  ADL Inpatient CMS 0-100% Score: 70.42 (01/09/20 1120)  ADL Inpatient CMS G-Code Modifier : CL (01/09/20 1120)    Goals  Short term goals  Time Frame for Short term goals: STG=LTG  Short term goal 1: Pt will complete grooming tasks with B UE with min A   Short term goal 2: Pt will complete functional transfer with min A with AD as needed-modA of 2  Short term goal 3: Pt will complete LB dressing tasks with min A with AD/AE as needed-dep  Patient Goals   Patient goals : Return home        Therapy Time   Individual Concurrent Group Co-treatment   Time In       3944   Time Out       1003   Minutes       15        Timed Code Treatment Minutes:  15 Minutes    Total Treatment Minutes:  [address] [facility], [address]

## 2020-01-09 NOTE — DISCHARGE SUMMARY
TISSUES/SKULL: No acute abnormality of the visualized skull or soft tissues. 1. Low-attenuation within the left thalamus as well as left posteromedial temporal lobe compatible with acute/subacute PCA territory infarctions. No associated hemorrhage. 2. Right frontal lobe encephalomalacia compatible with old right MCA territory infarction. Small area of encephalomalacia in the right cerebellum compatible with old infarction. 3. Parenchymal volume loss and sequela of chronic microvascular ischemic changes. Critical results were called by Dr. Eulogio López MD to Ángel Hutton on 1/3/2020 at 18:40. Xr Chest Portable    Result Date: 1/3/2020  EXAMINATION: ONE XRAY VIEW OF THE CHEST 1/3/2020 4:29 pm COMPARISON: 04/11/2013 HISTORY: ORDERING SYSTEM PROVIDED HISTORY: Right sided deficit, 24hrs TECHNOLOGIST PROVIDED HISTORY: Reason for exam:->Right sided deficit, 24hrs Reason for Exam: Right sided deficit, 24hrs Acuity: Acute Type of Exam: Initial FINDINGS: Cardial pericardial silhouette is stable in appearance. Bipolar pacemaker/ICD again noted. No focal infiltrates are identified. No pneumothorax is seen. No free air. No acute bony abnormality. No acute abnormality identified. Cta Head Neck W Contrast    Result Date: 1/4/2020  EXAMINATION: CTA OF THE HEAD AND NECK WITH CONTRAST 1/3/2020 7:34 pm: TECHNIQUE: CTA of the head and neck was performed with the administration of intravenous contrast. Multiplanar reformatted images are provided for review. MIP images are provided for review. Stenosis of the internal carotid arteries measured using NASCET criteria. Dose modulation, iterative reconstruction, and/or weight based adjustment of the mA/kV was utilized to reduce the radiation dose to as low as reasonably achievable. 3D surface reformatted and curved MIP images were submitted for review subsequent to initial interpretation. These images confirmed the findings in the original report. fluid collection. The gray-white differentiation is maintained. 1. Focal occlusion of the mid P2 segment of the left posterior cerebral artery. 2. No additional hemodynamically significant stenosis or branch occlusion in the cervical or intracranial arterial circulation. Critical results were called by Dr. Marija Quan MD to Niya OLIVER on 1/3/2020 at 21:18. Discharge Exam:  BP (!) 89/55   Pulse 68   Temp 97.5 °F (36.4 °C) (Oral)   Resp 16   Ht 6' (1.829 m)   Wt 206 lb 6.4 oz (93.6 kg)   SpO2 96%   BMI 27.99 kg/m²   General appearance: alert, appears stated age and cooperative  Head: Normocephalic, without obvious abnormality, atraumatic  Eyes: perrla  Throat: lips, mucosa, and tongue normal; teeth and gums normal  Lungs: clear to auscultation bilaterally  Heart: regular rate and rhythm, s1, s2 within normal  Abdomen: soft, non-tender; bowel sounds normal; no masses,  no organomegaly  Extremities: extremities normal, atraumatic, no cyanosis or edema  Pulses: 2+ and symmetric  Neurologic: Alert and oriented to person and place.   Arenas x 4 to command, right upper and right lower extremity weakness noted 4/5    Disposition: acute rehab unit    Condition: stable    Discharge Medications:   Ingridileana Trish   Home Medication Instructions IYX:[id number]    Printed on:01/09/20 1650   Medication Information                      ASPIRIN LOW DOSE 81 MG EC tablet  TAKE 1 TABLET BY MOUTH DAILY             atorvastatin (LIPITOR) 80 MG tablet  Take 1 tablet by mouth daily             carvedilol (COREG) 12.5 MG tablet  TAKE 1 TABLET BY MOUTH TWICE DAILY WITH MEALS             clopidogrel (PLAVIX) 75 MG tablet  Take 1 tablet by mouth daily             FLUoxetine (PROZAC) 20 MG capsule  Take 1 capsule by mouth daily             nicotine (NICODERM CQ) 21 MG/24HR  Place 1 patch onto the skin daily             pantoprazole (PROTONIX) 40 MG tablet  TAKE 1 TABLET BY MOUTH DAILY             QUEtiapine

## 2020-01-09 NOTE — PROGRESS NOTES
Jay Jay Fenton  1/9/2020  [id number]    Chief Complaint: <principal problem not specified>    Subjective:   Required ativan again yesterday due to agitation. Easily arousable this am. Limited participation with therapy yesterday but states he is willing to do \"whatever it takes\" and would be interested in an intensive therapy regimen. ROS: No CP, SOB, dyspnea    Objective:  Patient Vitals for the past 24 hrs:   BP Temp Temp src Pulse Resp SpO2   01/09/20 0510 -- -- -- 77 16 94 %   01/08/20 2326 117/79 98.5 °F (36.9 °C) Axillary 93 18 98 %   01/08/20 2145 (!) 119/91 98 °F (36.7 °C) Temporal 76 18 94 %   01/08/20 1622 122/85 98.2 °F (36.8 °C) Temporal 79 16 94 %   01/08/20 1200 121/85 98.1 °F (36.7 °C) Temporal 78 16 95 %     Gen: No distress, pleasant. Resting in bed  HEENT: Normocephalic, atraumatic. CV: No audible murmurs, well perfused extremities  Resp: No respiratory distress. No increased WOB  Abd: Soft, nontender nondistended  Ext: No edema. Neuro: Easily arousable, follows commands, appropriately interactive. Laboratory data: Available via EMR. Therapy progress:  PT  Position Activity Restriction  Other position/activity restrictions: Pt arrived via EMS from home for c/c of AMS and R sided weakness, CT head showed \" Low-attenuation within the left thalamus as well as left posteromedial temporal lobe compatible with acute/subacute PCA territory infarctions. Objective     Sit to Stand: Dependent/Total  Stand to sit: Dependent/Total  Device: No Device  Assistance: Dependent/Total(Mod Ax2)  Distance: 2-3'  OT  PT Equipment Recommendations  Equipment Needed: No     Assessment        SLP                Body mass index is 27.99 kg/m².     Assessment:  Patient Active Problem List   Diagnosis    Cellulitis    Hyponatremia    Arm pain    Essential hypertension    Ischemic chest pain (HCC)    Mixed hyperlipidemia    ARF (acute renal failure) (HCC)    Automatic implantable cardioverter-defibrillator in situ    Tobacco abuse    Primary cardiomyopathy ([facility] [address]    CAD (coronary artery disease)    Ischemic chest pain (HCC)    Unstable angina ([facility] [address]    Chest pain    Cocaine abuse ([facility] [address]    Ischemic cardiomyopathy    Hematochezia    Acute cerebrovascular accident (CVA) ([facility] [address]    Oropharyngeal dysphagia       Plan:   Acute left thalamic and temporal lobe infarcts: PT/OT for hemiplegia  Large right frontal lobe encephalomalacia: question stimulant abuse as coping mechanism for focus and attention. Consider regulated stimulant use in house    Agitation: environmental precautions. Dysphagia: SLP  HLD  HTN  CAD  Cardiomyopathy  H/O Cocaine abuse     Dispo: Patient is an appropriate ARU candidate. Precert remains pending. Hopeful for an answer today. We will continue to follow. Alfredito Stark MD 1/9/2020, 10:12 AM    * This document was created using dictation software. While all precautions were taken to ensure accuracy, errors may have occurred. Please disregard any typographical errors.

## 2020-01-10 LAB
ANION GAP SERPL CALCULATED.3IONS-SCNC: 11 MMOL/L (ref 3–16)
BUN BLDV-MCNC: 22 MG/DL (ref 7–20)
CALCIUM SERPL-MCNC: 9 MG/DL (ref 8.3–10.6)
CHLORIDE BLD-SCNC: 104 MMOL/L (ref 99–110)
CO2: 24 MMOL/L (ref 21–32)
CREAT SERPL-MCNC: 1.1 MG/DL (ref 0.8–1.3)
GFR AFRICAN AMERICAN: >60
GFR NON-AFRICAN AMERICAN: >60
GLUCOSE BLD-MCNC: 113 MG/DL (ref 70–99)
HCT VFR BLD CALC: 47.3 % (ref 40.5–52.5)
HEMOGLOBIN: 15.8 G/DL (ref 13.5–17.5)
MCH RBC QN AUTO: 31 PG (ref 26–34)
MCHC RBC AUTO-ENTMCNC: 33.3 G/DL (ref 31–36)
MCV RBC AUTO: 93.1 FL (ref 80–100)
PDW BLD-RTO: 13.7 % (ref 12.4–15.4)
PLATELET # BLD: 221 K/UL (ref 135–450)
PMV BLD AUTO: 9.1 FL (ref 5–10.5)
POTASSIUM SERPL-SCNC: 4 MMOL/L (ref 3.5–5.1)
RBC # BLD: 5.08 M/UL (ref 4.2–5.9)
SODIUM BLD-SCNC: 139 MMOL/L (ref 136–145)
WBC # BLD: 7.8 K/UL (ref 4–11)

## 2020-01-10 PROCEDURE — 6370000000 HC RX 637 (ALT 250 FOR IP): Performed by: PHYSICAL MEDICINE & REHABILITATION

## 2020-01-10 PROCEDURE — 85027 COMPLETE CBC AUTOMATED: CPT

## 2020-01-10 PROCEDURE — 2580000003 HC RX 258: Performed by: PHYSICAL MEDICINE & REHABILITATION

## 2020-01-10 PROCEDURE — 80048 BASIC METABOLIC PNL TOTAL CA: CPT

## 2020-01-10 PROCEDURE — 1280000000 HC REHAB R&B

## 2020-01-10 PROCEDURE — 6360000002 HC RX W HCPCS: Performed by: PHYSICAL MEDICINE & REHABILITATION

## 2020-01-10 PROCEDURE — 36415 COLL VENOUS BLD VENIPUNCTURE: CPT

## 2020-01-10 RX ORDER — CARVEDILOL 6.25 MG/1
6.25 TABLET ORAL 2 TIMES DAILY WITH MEALS
Status: DISCONTINUED | OUTPATIENT
Start: 2020-01-10 | End: 2020-01-14

## 2020-01-10 RX ORDER — ACETAMINOPHEN 325 MG/1
650 TABLET ORAL EVERY 4 HOURS PRN
Status: DISCONTINUED | OUTPATIENT
Start: 2020-01-10 | End: 2020-01-22 | Stop reason: HOSPADM

## 2020-01-10 RX ORDER — METHYLPHENIDATE HYDROCHLORIDE 10 MG/1
5 TABLET ORAL 2 TIMES DAILY
Status: DISCONTINUED | OUTPATIENT
Start: 2020-01-10 | End: 2020-01-10

## 2020-01-10 RX ORDER — SODIUM CHLORIDE 9 MG/ML
INJECTION, SOLUTION INTRAVENOUS CONTINUOUS
Status: DISCONTINUED | OUTPATIENT
Start: 2020-01-10 | End: 2020-01-11

## 2020-01-10 RX ORDER — SENNA AND DOCUSATE SODIUM 50; 8.6 MG/1; MG/1
2 TABLET, FILM COATED ORAL 2 TIMES DAILY
Status: DISCONTINUED | OUTPATIENT
Start: 2020-01-10 | End: 2020-01-22 | Stop reason: HOSPADM

## 2020-01-10 RX ADMIN — SODIUM CHLORIDE: 9 INJECTION, SOLUTION INTRAVENOUS at 09:14

## 2020-01-10 RX ADMIN — ASPIRIN 81 MG 81 MG: 81 TABLET ORAL at 10:04

## 2020-01-10 RX ADMIN — PANTOPRAZOLE SODIUM 40 MG: 40 TABLET, DELAYED RELEASE ORAL at 06:15

## 2020-01-10 RX ADMIN — CLOPIDOGREL 75 MG: 75 TABLET, FILM COATED ORAL at 10:04

## 2020-01-10 RX ADMIN — QUETIAPINE FUMARATE 50 MG: 25 TABLET ORAL at 23:16

## 2020-01-10 RX ADMIN — FLUOXETINE 20 MG: 20 CAPSULE ORAL at 10:03

## 2020-01-10 RX ADMIN — ATORVASTATIN CALCIUM 80 MG: 80 TABLET, FILM COATED ORAL at 10:04

## 2020-01-10 RX ADMIN — SODIUM CHLORIDE: 9 INJECTION, SOLUTION INTRAVENOUS at 17:02

## 2020-01-10 RX ADMIN — ENOXAPARIN SODIUM 40 MG: 40 INJECTION SUBCUTANEOUS at 10:03

## 2020-01-10 RX ADMIN — SENNOSIDES AND DOCUSATE SODIUM 2 TABLET: 8.6; 5 TABLET ORAL at 23:16

## 2020-01-10 RX ADMIN — SENNOSIDES AND DOCUSATE SODIUM 2 TABLET: 8.6; 5 TABLET ORAL at 10:04

## 2020-01-10 ASSESSMENT — PAIN SCALES - GENERAL
PAINLEVEL_OUTOF10: 0

## 2020-01-10 NOTE — PLAN OF CARE
tasks to 80% accuracy, min cues  Goal 5: Pt will particpate in ongoing cognitive-linguitic testing with additional goals to be established as necessary                 Plan of Care:  Pt to be seen 5 out of 7 days per week per ARU protocol (60 minutes with SLP)    Therapy Treatments will include:  [x]  therapeutic exercises    [x]  gait training     [x]  neuromuscular re-ed                            [x]  transfer training             [x] community reintegration    [x] bed mobility                          [x]  w/c mobility and training  [x]  self care    [x]home mgmt    [x]  cognitive training            []  energy conservation        [x]  dysphagia tx    [x]  speech/language/communication therapy   [x]  group therapy    [x]  patient/family education    [] Other:    CASE MANAGEMENT:  Goals:   Assist patient/family with discharge planning, patient/family counseling,   and coordination with insurance during ARU stay. Hammad Lomas will be seen a minimum of 3 hours of therapy per day, a minimum of 5 out of 7 days per week  (please see above for specific treatment plan per PT/OT/SLP). [] In this rare instance due to the nature of this patient's medical involvement, this patient will be seen 15 hours per week (900 minutes within a 7 day period). In addition, dietician/nutritionist may monitor calorie count as well as intake and collaboratively work with SLP on dietary upgrades. Neuropsychology/Psychology may evaluate and provide necessary support.     Medical issues being managed closely and that require 24 hour availability of a physician:  [] Swallowing Precautions  [x] Bowel/Bladder Fx  [] Weight bearing precautions  [] Wound Care    [x] Pain Mgmt   [] Infection Protection  [x] DVT Prophylaxis   [x] Fall Precautions  [x] Fluid/Electrolyte/Nutrition Balance  [] Voice Protection   [] Respiratory  [] Other:    Medical Prognosis: [] Good  [x] Fair    [] Guarded   Total expected IRF days 21 days  Anticipated discharge destination: Home  [] Home Independently   [] Home with supervision    []SNF     [x] Other                                           Physician anticipated functional outcomes: Will regain function to minimal assistance level for most activities, may require additional assistance at times   IPOC brief synthesis: 24-year-old male with a history of CAD, CK-MB, HTN, HLD, and status post ICD placement who was admitted on 1/3 with acute right-sided weakness. CTH revealed acute left thalamic and temporal lobe infarcts. CTA revealed an occlusion of the left PCA. echo with an EF of 35 percent and negative bubble study. . No A1c reported.  Of note he did have a UDS which was positive for amphetamines.  He was evaluated by neurology who considered anticoagulation but declined due to prior non-adherence.  He was evaluated by therapy and suggested to continue an inpatient setting prior to returning home.  he was admitted with the above goal.      I have reviewed this initial plan of care and agree with its contents:    Title   Name    Date    Time    Physician: Electronically signed by Ellie Holloway MD on 1/12/2020 at 7:11 PM      Case Mgmt: CHANTEL Rivera, [place], 1/10/2020, 5897    OT: Abilio Fair OTR/L, [place] #[id number], 1/11/2020, 1233    PT: Maru Ojeda, [address], 1/11/20, 1283    RN: Isak Irene RN, 1/9/2020, 5074    ST:  Christy Up, SLP-CCC 1/11/20 @ 21     : Louie Villafana, [address] [place] [address], 1/12/20 7463    Other:

## 2020-01-10 NOTE — H&P
allergies.     Current Facility-Administered Medications   Medication Dose Route Frequency Provider Last Rate Last Dose    sennosides-docusate sodium (SENOKOT-S) 8.6-50 MG tablet 2 tablet  2 tablet Oral BID Hamzah Johnson MD        [Held by provider] carvedilol (COREG) tablet 6.25 mg  6.25 mg Oral BID WC Hamzah Johnson MD        0.9 % sodium chloride infusion   Intravenous Continuous Hamzah Johnson MD        clopidogrel (PLAVIX) tablet 75 mg  75 mg Oral Daily Hamzah Johnson MD        enoxaparin (LOVENOX) injection 40 mg  40 mg Subcutaneous Daily Hamzah Johnson MD        magnesium hydroxide (MILK OF MAGNESIA) 400 MG/5ML suspension 30 mL  30 mL Oral Daily PRN Hamzah Johnson MD        ALPRAZolam Seretha long term) tablet 0.25 mg  0.25 mg Oral Q6H PRN Hamzah Johnson MD        aspirin chewable tablet 81 mg  81 mg Oral Daily Hamzah Johnson MD        atorvastatin (LIPITOR) tablet 80 mg  80 mg Oral Daily Hamzah Johnson MD        diphenhydrAMINE (BENADRYL) tablet 25 mg  25 mg Oral Q6H PRN Hamzah Johnson MD        FLUoxetine (PROZAC) capsule 20 mg  20 mg Oral Daily Hamzah Johnson MD        hydrALAZINE (APRESOLINE) tablet 50 mg  50 mg Oral Q8H PRN Hamzah Johnson MD        LORazepam (ATIVAN) injection 0.5 mg  0.5 mg Intramuscular Q6H PRN Hamzah Johnson MD        nicotine (NICODERM CQ) 14 MG/24HR 1 patch  1 patch Transdermal Daily Hamzah Johnson MD        ondansetron (ZOFRAN-ODT) disintegrating tablet 4 mg  4 mg Oral Q8H PRN Hamzah Johnson MD        pantoprazole (PROTONIX) tablet 40 mg  40 mg Oral Daily Hamzah Johnson MD   40 mg at 01/10/20 0615    QUEtiapine (SEROQUEL) tablet 50 mg  50 mg Oral Nightly Hamzah Johnson MD           REVIEW OF SYSTEMS:   Unable to be obtained reliably    Physical Examination:  Vitals:   Patient Vitals for the past 24 hrs:   BP Temp Temp src Pulse Resp SpO2 Height Weight   01/10/20 0756 103/73 97.6 °F (36.4 °C) Axillary 50 20 97 % -- --   01/10/20 0748 -- -- -- study.       BRAIN: No mass effect or midline shift. No extra-axial fluid collection. The   gray-white differentiation is maintained.           Impression   1. Focal occlusion of the mid P2 segment of the left posterior cerebral   artery. 2. No additional hemodynamically significant stenosis or branch occlusion in   the cervical or intracranial arterial circulation.      The above laboratory data have been reviewed.      The above imaging data have been reviewed.      The above medical testing have been reviewed. Body mass index is 27.93 kg/m². Barriers to Discharge: hypotension, neglect, hemiplegia, safety, ADLs    POST ADMISSION PHYSICIAN EVALUATION  The patient has agreed to being admitted to our comprehensive inpatient  rehabilitation facility consisting of at least 180 minutes of therapy a day,  5 out of 7 days a week. The patient/family has a good understanding of our discharge process. The  patient has potential to make improvement and is in need of at least two of  the following multidisciplinary therapies including but not limited to  physical, occupational, respiratory, and speech, nutritional services, wound care, and prosthetics and orthotics. Given the patients complex condition  and risk of further medical complications, rehabilitation services cannot be  safely provided at a lower level of care such as a skilled nursing facility. I have compared the patients medical and functional status at the time of the  preadmission screening and the same on this date, and there are no significant changes except as documented below in the assessment and plan. By signing this document, I acknowledge that I have personally performed a  full physical examination on this patient within 24 hours of admission to  this inpatient rehabilitation facility and have determined the patient to be  able to tolerate the above course of treatment at an intensive level for a  reasonable period of time.  I will be

## 2020-01-10 NOTE — DISCHARGE INSTR - COC
Continuity of Care Form    Patient Name: Isis Fernandez   :  1960  MRN:  [id number]    Admit date:  2020  Discharge date:  2020    Code Status Order: Full Code   Advance Directives:   [facility] Documentation     Date/Time Healthcare Directive Type of Healthcare Directive Copy in [address] Agent's Name Healthcare Agent's Phone Number    20  No, patient does not have an advance directive for healthcare treatment -- -- -- -- --          Admitting Physician:  Chey Xiong MD  PCP: Eugenia Perez MD    Discharging Nurse: [facility] Unit/Room#: MWN-9483/3001-41  Discharging Unit Phone Number:     Emergency Contact:   Extended Emergency Contact Information  Primary Emergency Contact: Ferdie Goldmann  Mobile Phone: [phone]  Relation: Parent  Secondary Emergency Contact: [address] Phone: [phone]  Mobile Phone: [phone]  Relation: Parent    Past Surgical History:  Past Surgical History:   Procedure Laterality Date    ARM DEBRIDEMENT  10-26-10    incision and drainage bilateral arms    CARDIAC SURGERY      three stents    CHOLECYSTECTOMY, OPEN      COLON SURGERY      COLONOSCOPY  2016    COLONOSCOPY  2019    COLONOSCOPY POLYPECTOMY SNARE/COLD BIOPSY performed by Lloyd Dang MD at [address] [place] [address] Drive Left 2013    defibrillator    UPPER GASTROINTESTINAL ENDOSCOPY  2017    esophageal erosions, esophageal dilation, biopsy    UPPER GASTROINTESTINAL ENDOSCOPY  2017    dilated       Immunization History: There is no immunization history on file for this patient.     Active Problems:  Patient Active Problem List   Diagnosis Code    Cellulitis L03.90    Hyponatremia E87.1    Arm pain M79.603    Essential hypertension I10    Ischemic chest pain (HCC) I20.9    Mixed hyperlipidemia E78.2    ARF [facility]acute [facility] failure) ([facility] N17.9    Automatic implantable cardioverter-defibrillator in situ Z95.810    Tobacco abuse Z72.0    Primary cardiomyopathy (HCC) I42.8    CAD (coronary artery disease) I25.10    Ischemic chest pain (HCC) I20.9    Unstable angina (HCC) I20.0    Chest pain R07.9    Cocaine abuse ([facility] F14.10    Ischemic cardiomyopathy I25.5    Hematochezia K92.1    Acute cerebrovascular accident (CVA) ([facility] I63.9    Oropharyngeal dysphagia R13.12    Organic mood 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    Current episode of major depressive disorder without prior episode F32.9    Acute ischemic stroke ([facility] I63.9       Isolation/Infection:   Isolation          No Isolation        Patient Infection Status     None to display          Nurse Assessment:  Last Vital Signs: BP 90/60 Comment: manual  Pulse 67   Temp 97.6 °F (36.4 °C) (Axillary)   Resp 14   Ht 6' (1.829 m)   Wt 205 lb 14.4 oz (93.4 kg)   SpO2 97%   BMI 27.93 kg/m²     Last documented pain score (0-10 scale): Pain Level: 0  Last Weight:   Wt Readings from Last 1 Encounters:   01/09/20 205 lb 14.4 oz (93.4 kg)     Mental Status:  oriented    IV Access:  - None    Nursing Mobility/ADLs:  Walking   Assisted  Transfer  Assisted  Bathing  Assisted  Dressing  Assisted  Toileting  Assisted  Feeding  Assisted  Med Admin  Independent  Med Delivery   whole    Wound Care Documentation and Therapy:        Elimination:  Continence:   · Bowel: Yes  · Bladder: Yes  Urinary Catheter: None   Colostomy/Ileostomy/Ileal Conduit: No       Date of Last BM: 01/22/2020    Intake/Output Summary (Last 24 hours) at 1/10/2020 1029  Last data filed at 1/9/2020 2002  Gross per 24 hour   Intake --   Output 275 ml   Net -275 ml     I/O last 3 completed shifts:  In: -   Out: 275 [Urine:275]    Safety Concerns: At Risk for Falls    Impairments/Disabilities:      None    Nutrition Therapy:  Current Nutrition Therapy:   - Oral Diet:  General    Routes of Feeding: Oral  Liquids:  Thin

## 2020-01-10 NOTE — PROGRESS NOTES
4 Eyes Skin Assessment     The patient is being assess for  admission    I agree that 2 RN's have performed a thorough Head to Toe Skin Assessment on the patient. ALL assessment sites listed below have been assessed. Areas assessed by both nurses:   [x]   Head, Face, and Ears   [x]   Shoulders, Back, and Chest  [x]   Arms, Elbows, and Hands   [x]   Coccyx, Sacrum, and IschIum  [x]   Legs, Feet, and Heels        Does the Patient have Skin Breakdown?  no      Jon Prevention initiated:  no  Wound Care Orders initiated:  no      [facility] nurse consulted for Pressure Injury (Stage 3,4, Unstageable, DTI, NWPT, and Complex wounds), New and Established Ostomies:  no      Nurse 1 eSignature: Juhi [place] Junior    [employer] this note so that the co-signing nurse is able to place an eSignature**    Nurse 2 eSignature: Nba Chaves RN

## 2020-01-10 NOTE — PROGRESS NOTES
Admission Period/Goal QM Codes    QM Admit Code Goal Code   Eating 5 6   Oral Hygiene 3 6   Toileting Hygiene 9 6   Shower/Bathing 3 6   UB Dressing 2 6   LB Dressing 2 6   Putting on/off Footwear 1 6   Rolling Left and Right 1 6   Sit To Lying 1 6   Lying to Sitting on Bedside 1 6   Sit to Stand 1 4   Chair/Bed to Chair Transfer 1 4   Toilet Transfers 1 4   Car Transfers 88 -   Walk 10 Feet 1 4   Walk 50 Feet with Two Turns 88 4   Walk 150 Feet 88 9   Walk 10 Feet on Uneven Surfaces 88 4   1 Step (Curb) 1 3   4 Steps 88 3   12 Steps 80 9   Picking up Object from Floor 88 3   Wheel 50 Feet with 2 Turns 3 6   Type manual    Wheel 150 Feet 88 6   Type manual        Following discussion at the [place] [address], the above codes were determined to be the patient's usual performance at admission. OT:  Brett Rosas OTR/L, [id number]   1/13/2020     1217     PT:  Osmar López PT, DPT - [id number]   1/13/2020    2289     RN:  Meghan Rodarte RN 1/13/20 1156     ST:  Zeenat LEIGH  [id number] [place] [address] [address][id number] 1/13/19, 0740    : Talha Price, [address] [place] [address], 1/12/20 4679

## 2020-01-10 NOTE — PROGRESS NOTES
Physical Therapy  Attempted to see pt for a scheduled physical theapy evaluation this date. Per RN Theo Garner) pt placed on a medical hold at this time due to low BP. Will complete evaluation when medical hold is lifted and pt is able to participate/ tolerate.   Zaida Alejandre, DPT [id number]

## 2020-01-10 NOTE — PROGRESS NOTES
Occupational Therapy Discharge Summary    Name: Mechelle Welch  : 1960    The pt was evaluated by OT on 20 and seen for 4 treatment sessions prior to DC to ARU on 20, per MD order. The pt's acute therapy goals were:  Short term goals  Time Frame for Short term goals: STG=LTG  Short term goal 1: Pt will complete grooming tasks with B UE with min A   Short term goal 2: Pt will complete functional transfer with min A with AD as needed-modA of 2  Short term goal 3: Pt will complete LB dressing tasks with min A with AD/AE as needed-dep        Patient met 0 goals during stay. Number of Refusals:0  Number of Holds: 0    During this hospitalization, the patient was educated on: OT Role;Plan of Care;Transfer Training       DC pt from OT caseload at this time. Thank you!     Torsten Conner, OTR/L, [id number]

## 2020-01-10 NOTE — PROGRESS NOTES
Speech Language Pathology  Medical - HOLD    Attempted to see pt for a scheduled speech language evaluation this date. Per RN Ramsey Knows) pt placed on a medical hold at this time due to low BP. Will complete evaluation when medical hold is lifted and pt is able to participate/ tolerate. Shayna LEIGH  CCC-SLP #[id number] 1/10/2020 8:29 AM  Speech-Language Pathologist

## 2020-01-11 LAB
ANION GAP SERPL CALCULATED.3IONS-SCNC: 10 MMOL/L (ref 3–16)
BUN BLDV-MCNC: 15 MG/DL (ref 7–20)
CALCIUM SERPL-MCNC: 8.8 MG/DL (ref 8.3–10.6)
CHLORIDE BLD-SCNC: 108 MMOL/L (ref 99–110)
CO2: 23 MMOL/L (ref 21–32)
CREAT SERPL-MCNC: 1 MG/DL (ref 0.8–1.3)
GFR AFRICAN AMERICAN: >60
GFR NON-AFRICAN AMERICAN: >60
GLUCOSE BLD-MCNC: 101 MG/DL (ref 70–99)
POTASSIUM SERPL-SCNC: 4 MMOL/L (ref 3.5–5.1)
SODIUM BLD-SCNC: 141 MMOL/L (ref 136–145)

## 2020-01-11 PROCEDURE — 97112 NEUROMUSCULAR REEDUCATION: CPT

## 2020-01-11 PROCEDURE — 97116 GAIT TRAINING THERAPY: CPT

## 2020-01-11 PROCEDURE — 1280000000 HC REHAB R&B

## 2020-01-11 PROCEDURE — 92523 SPEECH SOUND LANG COMPREHEN: CPT

## 2020-01-11 PROCEDURE — 92610 EVALUATE SWALLOWING FUNCTION: CPT

## 2020-01-11 PROCEDURE — 97530 THERAPEUTIC ACTIVITIES: CPT

## 2020-01-11 PROCEDURE — 80048 BASIC METABOLIC PNL TOTAL CA: CPT

## 2020-01-11 PROCEDURE — 97535 SELF CARE MNGMENT TRAINING: CPT

## 2020-01-11 PROCEDURE — 6370000000 HC RX 637 (ALT 250 FOR IP): Performed by: PHYSICAL MEDICINE & REHABILITATION

## 2020-01-11 PROCEDURE — 2580000003 HC RX 258: Performed by: PHYSICAL MEDICINE & REHABILITATION

## 2020-01-11 PROCEDURE — 97162 PT EVAL MOD COMPLEX 30 MIN: CPT

## 2020-01-11 PROCEDURE — 97166 OT EVAL MOD COMPLEX 45 MIN: CPT

## 2020-01-11 PROCEDURE — 6360000002 HC RX W HCPCS: Performed by: PHYSICAL MEDICINE & REHABILITATION

## 2020-01-11 PROCEDURE — 94760 N-INVAS EAR/PLS OXIMETRY 1: CPT

## 2020-01-11 PROCEDURE — 36415 COLL VENOUS BLD VENIPUNCTURE: CPT

## 2020-01-11 RX ORDER — SODIUM CHLORIDE 0.9 % (FLUSH) 0.9 %
10 SYRINGE (ML) INJECTION 2 TIMES DAILY
Status: DISCONTINUED | OUTPATIENT
Start: 2020-01-11 | End: 2020-01-13 | Stop reason: ALTCHOICE

## 2020-01-11 RX ADMIN — SENNOSIDES AND DOCUSATE SODIUM 2 TABLET: 8.6; 5 TABLET ORAL at 09:05

## 2020-01-11 RX ADMIN — QUETIAPINE FUMARATE 50 MG: 25 TABLET ORAL at 21:30

## 2020-01-11 RX ADMIN — CLOPIDOGREL 75 MG: 75 TABLET, FILM COATED ORAL at 09:05

## 2020-01-11 RX ADMIN — FLUOXETINE 20 MG: 20 CAPSULE ORAL at 09:05

## 2020-01-11 RX ADMIN — SENNOSIDES AND DOCUSATE SODIUM 2 TABLET: 8.6; 5 TABLET ORAL at 21:30

## 2020-01-11 RX ADMIN — SODIUM CHLORIDE: 9 INJECTION, SOLUTION INTRAVENOUS at 01:24

## 2020-01-11 RX ADMIN — ATORVASTATIN CALCIUM 80 MG: 80 TABLET, FILM COATED ORAL at 09:05

## 2020-01-11 RX ADMIN — ASPIRIN 81 MG 81 MG: 81 TABLET ORAL at 09:05

## 2020-01-11 RX ADMIN — ENOXAPARIN SODIUM 40 MG: 40 INJECTION SUBCUTANEOUS at 09:05

## 2020-01-11 RX ADMIN — PANTOPRAZOLE SODIUM 40 MG: 40 TABLET, DELAYED RELEASE ORAL at 06:26

## 2020-01-11 RX ADMIN — Medication 10 ML: at 21:33

## 2020-01-11 ASSESSMENT — PAIN SCALES - GENERAL
PAINLEVEL_OUTOF10: 0
PAINLEVEL_OUTOF10: 0

## 2020-01-11 NOTE — PROGRESS NOTES
Physical Therapy    Facility/Department: [facility] ACUTE REHAB UNIT  Initial Assessment    NAME: Jan Armenta  : 1960  MRN: [id number]    Date of Service: 2020    Discharge Recommendations:  Home with Home health PT, 24 hour supervision or assist   PT Equipment Recommendations  Equipment Needed: Yes  Other: TBD with progress     Assessment   Body structures, Functions, Activity limitations: Decreased functional mobility ; Decreased strength;Decreased endurance;Decreased ADL status; Decreased safe awareness;Decreased sensation;Decreased cognition;Decreased balance;Decreased posture;Decreased coordination  Assessment: Pt presenting with the above deficits secondary to recent CVA. Pt impulsive, inattentive to R side, and unaware of all his deficits. Pt lacking sensation in RLE and has weakness limiting ability to advance LE during transfer, gait, and stairs. Continued skilled PT to promote return to PLOF. Treatment Diagnosis: R inattention, weakness, impaired sensation secondary to CVA  Prognosis: Good  Decision Making: Medium Complexity  PT Education: Goals;PT Role;Plan of Care;General Safety;Transfer Training;Orientation;Gait Training;Functional Mobility Training  Barriers to Learning: cognitive  REQUIRES PT FOLLOW UP: Yes  Activity Tolerance  Activity Tolerance: Patient Tolerated treatment well       Patient Diagnosis(es): There were no encounter diagnoses. has a past medical history of CAD (coronary artery disease), Chronic kidney disease, Collapsed lung, GERD (gastroesophageal reflux disease), Hematuria, Hyperlipidemia, Hypertension, ICD (implantable cardioverter-defibrillator) in place, Mural thrombus of left ventricle, and Tortuous aorta ([facility] 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)  Leisure & Hobbies: roller skating   Additional Comments: denied falls  Cognition        Objective     Observation/Palpation  Posture: Fair  Observation: L visual field preference     PROM RLE (degrees)  RLE PROM: WFL  RLE General PROM: with increased tone throughout range  AROM RLE (degrees)  RLE General AROM: able to extend knee >50% of full range; other AROM limited by weakness   AROM LLE (degrees)  LLE AROM : WFL  Strength RLE  Strength RLE: Exception  R Hip Flexion: 1/5  R Knee Flexion: 2+/5  R Knee Extension: 2+/5  R Ankle Dorsiflexion: 0/5  Strength LLE  Strength LLE: WFL  Tone RLE  RLE Tone: Hypertonic  Tone Description: increased tone with knee extension and ankle DF testing   Sensation  Overall Sensation Status: Impaired  Light Touch: Partial deficits in the RUE;Partial deficits in the RLE  Additional Comments: unable to discern light touch in RUE/RLE  Bed mobility  Rolling to Left: Minimal assistance(with bed railing used)  Supine to Sit: Minimal assistance(HOB elevated, assist of RLE and trunk, bed mobility towards L side of bed)  Sit to Supine: Unable to assess(pt left in chair)  Scooting: Minimal assistance  Comment: HOB raised   Transfers  Sit to Stand:  Moderate Assistance(mod A without grab bar, min A within shower using grab bar)  Stand to sit: Moderate Assistance(for controlled lowering)  Bed to Chair: Dependent/Total  Stand Pivot Transfers: Dependent/Total(max A +min A towards R,  mod A of 1 toward L)  Car Transfer: Unable to assess(due to safety reasons)  Comment: Impulsive, unaware of location of RUE during transfers, assist to move RLE during transfer   Ambulation  Ambulation?: Yes  Ambulation 1  Surface: level tile  Device: Parallel Bars  Assistance: Dependent/Total(Max A of RLE advancement + min A for balance and movement of RUE)  Quality of Gait: Cues to extend RLE (did not buckle), max A to advance RLE then cued at glut during stance, step-to pattern, Assist to maintain  and advance RUE,  Min A of 2 for balance   Distance: 5 ft x 2   Stairs/Curb  Stairs?: Yes  Stairs  # Steps : 1  Stairs Height: 4\"  Rails: Bilateral  Assistance: Dependent/Total(max A of 2)  Comment: impulsive and not following commands (R vs L),  max A to lift RLE onto/off step (RLE adducted across midline), cues at R glut and knee for extension   Wheelchair Activities  Propulsion: Yes  Propulsion 1  Propulsion: Manual  Level: Level Tile  Method: LUE;LLE  Level of Assistance: Minimal assistance  Description/ Details: straight pathway plus left handed turn through doorway   Distance: 94 ft      Balance  Posture: Fair(rounded spine with lateral lean R)  Sitting - Static: Good  Sitting - Dynamic: Fair  Standing - Static: Fair  Standing - Dynamic: Poor      2nd session: pt supine in bed at arrival, denied pain, agreed to therapy after waking up. Bed mobility towards R side with mod A for rolling and supine to sit (HOB elevated, cues for hooking legs to scoot over EOB). SPT towards L side to wc with mod A (impulsive and unaware of where RUE was during transfer). SPT to commode with use of grab bar towards L side with mod A. Assist for LB dressing management. Transfer off of commode towards R side max A of 1 plus min A of 1. Attempted use of AGUEDA walker but PT unable to advance RLE due to tone. [place] railing ambulation x 25 ft with max A of 1 for RLE advancement  plus mod A of 1 for wt shift L (cues at R glut and R knee for extension). Standing balance without UE support ranging from CGA to mod A with cues for R knee extension and upright posture while performing dynamic reaching with both RUE/LE at various directions. SPT to recliner towards R with max A of 1 plus min A of 1 (pt using unsafe technique). Left in chair, alarm on and needs in reach.    Plan   Plan  Times per week: 5 of 7 days per week for 60 minutes per day   Times per day: Daily  Current Treatment Recommendations: Strengthening, Functional Mobility Training, Transfer Training, Balance Training, Endurance Training, Stair training, Gait Training, Neuromuscular Re-education, Wheelchair Mobility Training, Safety Education & Training, Patient/Caregiver Education & Training, Manual Therapy - Joint Manipulation  Safety Devices  Type of devices: All fall risk precautions in place, Call light within reach, Chair alarm in place, Left in chair, Nurse notified, Patient at risk for falls, Telesitter in use  Restraints  Initially in place: No    G-Code       OutComes Score                                                  AM-PAC Score             Goals  Short term goals  Time Frame for Short term goals: to be met in 1.5 wks   Short term goal 1: pt able to perform bed mobility with SBA  Short term goal 2: pt able to perform wc mobility 150ft mod I  Short term goal 3: pt able to perform transfers with min A of 1  Short term goal 4: pt able to ambulate 25 ft with LRAD and mod A   Short term goal 5: pt able to navigate 4 steps with handrailing and mod A  Long term goals  Time Frame for Long term goals : to be met in 3 wks   Long term goal 1: pt able to perform bed mobility mod I  Long term goal 2: pt able to perform transfers with supervision   Long term goal 3: pt able to ambulate 50+ ft with LRAD and supervision   Long term goal 4: pt able to navigate 4 steps with handrailing and supervision   Patient Goals   Patient goals :  To go home       Therapy Time   Individual Concurrent Group Co-treatment   Time In       0930   Time Out       1030   Minutes       60   Timed Code Treatment Minutes: 39 Minutes     Second Session Therapy Time:   Individual Concurrent Group Co-treatment   Time In       2720   Time Out       1315   Minutes       30     Timed Code Treatment Minutes:  75    Total Treatment Minutes:  90    Sanjana De Leon, PT

## 2020-01-11 NOTE — PROGRESS NOTES
Speech Language Pathology  Facility/Department: [facility] ACUTE REHAB UNIT   CLINICAL BEDSIDE SWALLOW EVALUATION    NAME: Norm Joyner  : 1960  MRN: [id number]    ADMISSION DATE: 2020  ADMITTING DIAGNOSIS: has Cellulitis; Hyponatremia; Arm pain; Essential hypertension; Ischemic chest pain ([address] Mixed hyperlipidemia; ARF (acute renal failure) ([address] Automatic implantable cardioverter-defibrillator in situ; Tobacco abuse; Primary cardiomyopathy ([address] CAD (coronary artery disease); Ischemic chest pain ([address] Unstable angina ([address] Chest pain; Cocaine abuse ([facility] Ischemic cardiomyopathy; Hematochezia; Acute cerebrovascular accident (CVA) ([facility] Oropharyngeal dysphagia; Organic mood disorder; Current episode of major depressive disorder without prior episode; and Acute ischemic stroke [facility]) on their problem list.  ONSET DATE:  1/3/20    Recent Chest Xray: (1/3)  \"No acute abnormality identified. \"    Date of Eval: 2020  Evaluating Therapist: Alayna Amaral    Current Diet level:  Current Diet : Dysphagia Soft and Bite-Sized (Dysphagia III)  Current Liquid Diet : Thin    Primary Complaint  To go home and have a cigarette    Pain:  Pain Assessment  Pain Assessment: 0-10  Pain Level: 0    Reason for Referral  Ori Lomas was referred for a bedside swallow evaluation to assess the efficiency of his swallow function, identify signs and symptoms of aspiration and make recommendations regarding safe dietary consistencies, effective compensatory strategies, and safe eating environment. Impression  Dysphagia Diagnosis: Mild oral stage dysphagia;Mild pharyngeal stage dysphagia      Treatment Plan  Requires SLP Intervention: Yes  Duration/Frequency of Treatment: 5x per week  D/C Recommendations: To be determined       Recommended Diet and Intervention  Diet Solids Recommendation: Dysphagia Soft and Bite-Sized (Dysphagia III)  Liquid Consistency Recommendation:  Thin  Recommended Form of Meds: Whole with samuel  Recommendations: Dysphagia treatment  Therapeutic Interventions: Bolus control exercises; Patient/Family education;Diet tolerance monitoring    Compensatory Swallowing Strategies  Compensatory Swallowing Strategies: Alternate solids and liquids;Small bites/sips; Remain upright for 30-45 minutes after meals;Upright as possible for all oral intake    Treatment/Goals  Dysphagia Goals:   1) The patient will tolerate recommended diet without observed clinical signs of aspiration  2) The patient will recall and perform compensatory strategies, with no cues  3) The patient will tolerate regular consistency solids 10/10. General  Chart Reviewed: Yes  Behavior/Cognition: Alert; Impulsive;Distractible;Requires cueing  Respiratory Status: Room air  Communication Observation: Functional  Follows Directions: Simple  Dentition: Adequate  Patient Positioning: Upright in bed  Prior Dysphagia History: Pt seen for BSE and dysphagia tx during inpatient stay and was tolerating a Soft and Bite-Sized (Dysphagia III) diet level with Thin liquids. Consistencies Administered: Reg solid; Dysphagia Minced and Moist (Dysphagia II); Dysphagia Soft and Bite-Sized (Dysphagia III); Thin - cup; Thin - straw    Vision/Hearing  Vision  Vision: Impaired  Vision Exceptions: Wears glasses for reading  Hearing  Hearing: Within functional limits    Oral Motor Deficits  Oral/Motor  Oral Motor: Exceptions to [facility]  Labial ROM: Reduced right  Lingual ROM: Reduced right    Oral Phase Dysfunction  Oral Phase  Oral Phase: Exceptions  Oral Phase Dysfunction  Impaired Mastication: Reg Solid     Indicators of Pharyngeal Phase Dysfunction   Pharyngeal Phase  Pharyngeal Phase: Exceptions  Indicators of Pharyngeal Phase Dysfunction  Delayed Swallow: All  Cough - Immediate:  Thin - straw    Prognosis  Prognosis  Prognosis for safe diet advancement: guarded  Barriers to reach goals: cognitive deficits  Individuals consulted  Consulted and agree with results and recommendations: Patient;RN    Education  Patient Education: role of SLP and POC  Patient Education Response: Needs reinforcement  Safety Devices in place: Yes  Type of devices: Bed alarm in place;Call light within reach       Therapy Time  SLP Individual Minutes  Time In: 0800  Time Out: 0830  Minutes: [id number] [place], [place], [id number]   Speech-Language Pathologist

## 2020-01-11 NOTE — PROGRESS NOTES
Speech Language Pathology  Facility/Department: [facility] ACUTE REH[facility] UNIT  Initial Speech/Language/Cognitive Assessment    NAME: Sung Miller  : 1960   MRN: [id number]  ADMISSION DATE: 2020  ADMITTING DIAGNOSIS: has Cellulitis; Hyponatremia; Arm pain; Essential hypertension; Ischemic chest pain ([address] Mixed hyperlipidemia; ARF (acute renal failure) ([address] Automatic implantable cardioverter-defibrillator in situ; Tobacco abuse; Primary cardiomyopathy ([address] CAD (coronary artery disease); Ischemic chest pain ([address] Unstable angina ([address] Chest pain; Cocaine abuse ([address] Ischemic cardiomyopathy; Hematochezia; Acute cerebrovascular accident (CVA) ([address] Oropharyngeal dysphagia; Organic mood disorder; Current episode of major depressive disorder without prior episode; and Acute ischemic stroke [facility]) on their problem list.  DATE ONSET:  1/3/20    Date of Eval: 2020   Evaluating Therapist: SADIE Ponce    RECENT RESULTS  CT OF HEAD:    Focal occlusion of the mid P2 segment of the left posterior cerebral   artery. Primary Complaint:  To go home and have a cigarette    Pain:  Pain Assessment  Pain Assessment: 0-10  Pain Level: 0    Assessment:  Cognitive Diagnosis: Pt presents with moderate cognitive deficits characterized by poor pragmatics (severely flat affect and limited eye contact), perseverativeness during conversation, reduced STM and reduced executive functioning for problem-solving tasks  Aphasia Diagnosis: Pt presents with moderate receptive aphasia deficits characterized by difficulty with complex commands/questions, suspect cognitive deficits contribute to difficulty  Speech Diagnosis: Mild dysathria noted in connected speech/conversation      Recommendations:  Requires SLP Intervention: Yes  Duration/Frequency of Treatment: 5x per week  D/C Recommendations:  To be determined     Plan:   Goals:  Short-term Goals  Goal 1: Pt will improve speech intelligibility in connected speech via graded tasks to 90% accuracy, min cues  Goal 2: Pt will improve auditory processing/comprehension of commands and questions to 80% accuracy, via graded tasks, with min cues  Goal 3: Pt will improve verbal expression/pragmatic skills for affect, eye contact, turn taking and appropriate content in conversation via graded tasks with 80% accuracy, min cues  Goal 4: Pt will improve executive function for multifactor task completion via graded tasks to 80% accuracy, min cues  Goal 5: Pt will particpate in ongoing cognitive-linguitic testing with additional goals to be established as necessary   Patient/family involved in developing goals and treatment plan: Yes    Subjective:  General  Chart Reviewed: Yes  Vision  Vision: Impaired  Vision Exceptions: Wears glasses for reading  Hearing  Hearing: Within functional limits     Objective:  Oral/Motor  Oral Motor: Exceptions to [facility]  Labial ROM: Reduced right  Lingual ROM: Reduced right  Auditory Comprehension  Comprehension: Exceptions  Complex Questions: Mild  Complex/Abstract Commands:  Moderate  Expression  Primary Mode of Expression: Verbal  Verbal Expression  Verbal Expression: Exceptions to functional limits  Divergent: Mild  Motor Speech  Motor Speech: Exceptions to [facility]  Dysarthria : Mild  Pragmatics/Social Functioning  Pragmatics: Exceptions to [facility]  Affect: Moderate  Eye Contact: Moderate    Cognition:   Orientation  Overall Orientation Status: Impaired  Attention  Attention: Exceptions to [facility]  Alternating Attention: Mild  Selective Attention: Mild  Memory  Memory: Exceptions to [facility]  Problem Solving  Problem Solving: Exceptions to [facility]  Verbal Reasoning Skills: Mild  Abstract Reasoning  Abstract Reasoning: Exceptions to [facility]    Prognosis:  Speech Therapy Prognosis  Prognosis: Guarded  Prognosis Considerations: Previous Level of Function;Participation Level  Individuals consulted  Consulted and agree with results and recommendations: Patient;RN    Education:  Patient Education: role of SLP and POC  Patient Education Response: Needs reinforcement  Safety Devices in place: Yes  Type of devices: Bed alarm in place;Call light within reach    Therapy Time:   Individual Concurrent Group Co-treatment   Time In 0830         Time Out 0900         Minutes [address] Dona Shah MS, CCC-SLP #[id number]   Speech-Language Pathologist

## 2020-01-11 NOTE — PROGRESS NOTES
Occupational Therapy   Occupational Therapy Initial Assessment & Treatment   Date: 2020   Patient Name: Sophie Cazares  MRN: [id number]     : 1960    Date of Service: 2020    Discharge Recommendations:  Continue to assess pending progress, Patient would benefit from continued therapy after discharge  OT Equipment Recommendations  Equipment Needed: Yes  Other: Pt may require grab bars in shower, grab bars around toilet, shower chair    Assessment   Performance deficits / Impairments: Decreased functional mobility ; Decreased ROM; Decreased strength;Decreased sensation;Decreased balance;Decreased ADL status; Decreased safe awareness;Decreased coordination;Decreased cognition;Decreased vision/visual deficit  Assessment: 62 y.o. male presents w/ the above deficits which are impacing her occupational performance. Pt would benefit from continued therapy during inpatient stay in order to maximize safety and independence upon discharge   Treatment Diagnosis: Multiple performance deficits s/p CVA  Prognosis: Fair  Decision Making: Medium Complexity  OT Education: OT Role;Plan of Care;Transfer Training;ADL Adaptive Strategies; Equipment;Orientation  REQUIRES OT FOLLOW UP: Yes  Activity Tolerance  Activity Tolerance: Patient Tolerated treatment well  Activity Tolerance: Pt impulsive w/ movement, requires redirection to task, easily distracted   Safety Devices  Safety Devices in place: Yes  Type of devices: All fall risk precautions in place;Nurse notified;Call light within reach; Patient at risk for falls; Left in chair;Chair alarm in place           Patient Diagnosis(es): CVA   has a past medical history of CAD (coronary artery disease), Chronic kidney disease, Collapsed lung, GERD (gastroesophageal reflux disease), Hematuria, Hyperlipidemia, Hypertension, ICD (implantable cardioverter-defibrillator) in place, Mural thrombus of left ventricle, and Tortuous aorta ([address]    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)  Leisure & Hobbies: roller skating   Additional Comments: denied falls     Objective   Vision: Impaired  Vision Exceptions: Wears glasses for reading  Hearing: Within functional limits    Orientation  Overall Orientation Status: Impaired  Orientation Level: Oriented to person;Oriented to place; Disoriented to situation;Disoriented to time  Observation/Palpation  Posture: Fair  Observation: L visual field preference       Balance  Sitting Balance: Minimal assistance(Varied assist from CGA-Min A )  Standing Balance: Minimal assistance  Standing Balance  Time: ~8 min total  Activity: Transfers, ADL completion, standing in parallel bars   Comment: Verbal cues to attend to R side, HOHA for R UE placement     Functional Mobility  Functional - Mobility Device: Other  Activity: Other  Assist Level: Dependent/Total  Functional Mobility Comments: Pt ambulated length of parallel bars x2. Max A from PT to advance RLE, Min A for this writer for upright posture/advancing RUE. Constant verbal cues throughout for technique     Shower Transfers  Shower - Transfer From: Wheelchair  Shower - Transfer Type: To and From  Shower - Transfer To: Other  Shower - Technique: To left  Shower Transfers: Moderate assistance  Shower Transfers Comments: w/ grab bar. Verbal cues throughout for technique     ADL  Grooming: Setup;Minimal assistance(Oral care while seated in w/c at sink. HOHA to use RUE to best of ability )  UE Bathing: Moderate assistance;Setup;Verbal cueing(HOHA to use RUE, assist for drying for thoroughness )  LE Bathing:  Moderate assistance;Setup;Verbal cueing(assist to dry lower legs and buttocks )  UE Dressing: Maximum assistance(to don shirt )  LE Dressing: Maximum awareness of deficits  Initiation: Requires cues for all  Sequencing: Requires cues for all  Cognition Comment: Pt able to recognize physical deficits but unable to relate those deficits to everyday tasks. Pt able to state \"My balance isnt very good\", but pt also stating \"I think I'm ready to go home\"   Perception  Overall Perceptual Status: Impaired  Unilateral Attention: Cues to attend to right side of body;Cues to maintain midline in standing  Initiation: Cues to initiate tasks     Sensation  Overall Sensation Status: Impaired  Light Touch: Partial deficits in the RUE;Partial deficits in the RLE  Additional Comments: unable to discern light touch in RUE/RLE        LUE AROM (degrees)  LUE AROM : WFL  RUE AROM (degrees)  RUE AROM : WFL  LUE Strength  Gross LUE Strength: WFL  RUE Strength  Gross RUE Strength: Exceptions to [facility]  RUE Strength Comment: 2+ in all joints           Second Session: Pt seated in recliner upon entry, agreeable to OT/PT cotx. Pt denies pain, declines ADLs. Pt transported to [facility], pt then stating \"I need to pee\". Pt transported back to bathroom, completed SPT>commode w/ Mod A. Very close CGA while pt seated on commode d/t impulsivity w/ movement. Second person assist required for standing balance while this writer completed clothing management in stance. Dependent for toileting. Pt transported to [facility]. Attempted functional mobility w/ AGUEDA walker, pt w/ increased tone, unable to advance RLE. Pt completed 1 attempt of functional mobility at [place] rail. Max A to advance RLE, Mod A for management of RUE and weight shift from this writer. Pt completed 2 stances from w/c w/ Mod A, pt completed dynamic reaching once in stance w/ R & L arm. Pt demos decreased coordination in RUE, over/undershooting targets, decreased  strength. Rest breaks incorporated between stances. Pt transported back to room. SPT>recliner w/ assist x2.  Pt set up w/ lunch at end of session, seated in recliner w/ alarm on, needs within reach. Plan   Plan  Times per week: 60 min; 5-7x week  Times per day: Daily  Current Treatment Recommendations: Strengthening, Balance Training, ROM, Functional Mobility Training, Neuromuscular Re-education, Safety Education & Training, Self-Care / ADL, Patient/Caregiver Education & Training, Cognitive/Perceptual Training      Goals  Short term goals  Time Frame for Short term goals: 7-10 days   Short term goal 1: Bathing w/ Min A   Short term goal 2: UB dressing w/ Min A  Short term goal 3: LB dressing w/ Min A  Short term goal 4: Functional transfer for ADL completion w/ Min A  Short term goal 5: Toileting w/ Min A  Long term goals  Time Frame for Long term goals : 3 weeks   Long term goal 1: Bathing w/ Mod I  Long term goal 2: UB dressing w/ Mod I  Long term goal 3: LB dressing w/ Mod I  Long term goal 4: Functional transfer for ADL completion w/ Mod I  Long term goal 5: Toileting w/ Mod I  Patient Goals   Patient goals :  \"To go home\"       Therapy Time   Individual Concurrent Group Co-treatment   Time In       0930, 1245   Time Out       1030, 1315   Minutes       60, 30         Timed Code Treatment Minutes:   45 + 30     Total Treatment Minutes:  90 minutes       SANDRA Morris OTR/L, [place] #[id number]

## 2020-01-11 NOTE — PLAN OF CARE
Problem: Falls - Risk of:  Goal: Will remain free from falls  Description  Will remain free from falls  1/11/2020 0404 by Karol Partida RN  Outcome: Ongoing  1/10/2020 1822 by Ofelia Dodson RN  Outcome: Ongoing  Goal: Absence of physical injury  Description  Absence of physical injury  1/11/2020 0404 by Karol Partida RN  Outcome: Ongoing  1/10/2020 1822 by Ofelia Dodson RN  Outcome: Ongoing     Problem: SAFETY  Goal: LTG - Patient will demonstrate safety requirements appropriate to situation/environment  1/11/2020 0404 by Karol Partida RN  Outcome: Ongoing  1/10/2020 1822 by Ofelia Dodson RN  Outcome: Ongoing  Goal: STG - Patient uses call light consistently to request assistance with transfers  1/11/2020 0404 by Karol Partida RN  Outcome: Ongoing  1/10/2020 1822 by Ofelia Dodson RN  Outcome: Ongoing     Problem: IP BOWEL ELIMINATION  Goal: LTG - patient will have regular and routine bowel evacuation  1/11/2020 0404 by Karol Partida RN  Outcome: Ongoing  1/10/2020 1822 by Ofelia Dodson RN  Outcome: Ongoing  Goal: STG - patient will be accident free  1/11/2020 0404 by Karol Partida RN  Outcome: Ongoing  1/10/2020 1822 by Ofelia Dodson RN  Outcome: Ongoing     Problem: IP BLADDER/VOIDING  Goal: LTG - patient will achieve acceptable level of continence  1/11/2020 0404 by Karol Partida RN  Outcome: Ongoing  1/10/2020 1822 by Ofelia Dodson RN  Outcome: Ongoing  Goal: STG - patient will be able to empty bladder  1/11/2020 0404 by Karol Partida RN  Outcome: Ongoing  1/10/2020 1822 by Ofelia Dodson RN  Outcome: Ongoing     Problem: SKIN INTEGRITY  Goal: LTG - Patient will demonstrate appropriate pressure relief techniques  1/11/2020 0404 by Karol Partida RN  Outcome: Ongoing  1/10/2020 1822 by Ofelia Dodson RN  Outcome: Ongoing  Goal: STG - patient will maintain good skin integrity  1/11/2020 0404 by Karol Partida RN  Outcome: Ongoing  1/10/2020

## 2020-01-12 LAB
ANION GAP SERPL CALCULATED.3IONS-SCNC: 12 MMOL/L (ref 3–16)
BUN BLDV-MCNC: 13 MG/DL (ref 7–20)
CALCIUM SERPL-MCNC: 9 MG/DL (ref 8.3–10.6)
CHLORIDE BLD-SCNC: 107 MMOL/L (ref 99–110)
CO2: 24 MMOL/L (ref 21–32)
CREAT SERPL-MCNC: 1 MG/DL (ref 0.8–1.3)
GFR AFRICAN AMERICAN: >60
GFR NON-AFRICAN AMERICAN: >60
GLUCOSE BLD-MCNC: 97 MG/DL (ref 70–99)
POTASSIUM SERPL-SCNC: 4.1 MMOL/L (ref 3.5–5.1)
SODIUM BLD-SCNC: 143 MMOL/L (ref 136–145)

## 2020-01-12 PROCEDURE — 1280000000 HC REHAB R&B

## 2020-01-12 PROCEDURE — 2580000003 HC RX 258: Performed by: PHYSICAL MEDICINE & REHABILITATION

## 2020-01-12 PROCEDURE — 6370000000 HC RX 637 (ALT 250 FOR IP): Performed by: PHYSICAL MEDICINE & REHABILITATION

## 2020-01-12 PROCEDURE — 80048 BASIC METABOLIC PNL TOTAL CA: CPT

## 2020-01-12 PROCEDURE — 6360000002 HC RX W HCPCS: Performed by: PHYSICAL MEDICINE & REHABILITATION

## 2020-01-12 PROCEDURE — 36415 COLL VENOUS BLD VENIPUNCTURE: CPT

## 2020-01-12 RX ORDER — BISACODYL 10 MG
10 SUPPOSITORY, RECTAL RECTAL DAILY PRN
Status: DISCONTINUED | OUTPATIENT
Start: 2020-01-12 | End: 2020-01-22 | Stop reason: HOSPADM

## 2020-01-12 RX ORDER — LORAZEPAM 0.5 MG/1
0.5 TABLET ORAL EVERY 4 HOURS PRN
Status: DISCONTINUED | OUTPATIENT
Start: 2020-01-12 | End: 2020-01-22 | Stop reason: HOSPADM

## 2020-01-12 RX ADMIN — Medication 10 ML: at 14:29

## 2020-01-12 RX ADMIN — FLUOXETINE 20 MG: 20 CAPSULE ORAL at 08:47

## 2020-01-12 RX ADMIN — LORAZEPAM 0.5 MG: 0.5 TABLET ORAL at 16:58

## 2020-01-12 RX ADMIN — MAGNESIUM HYDROXIDE 30 ML: 400 SUSPENSION ORAL at 08:50

## 2020-01-12 RX ADMIN — QUETIAPINE FUMARATE 50 MG: 25 TABLET ORAL at 20:17

## 2020-01-12 RX ADMIN — CLOPIDOGREL 75 MG: 75 TABLET, FILM COATED ORAL at 08:47

## 2020-01-12 RX ADMIN — ASPIRIN 81 MG 81 MG: 81 TABLET ORAL at 08:47

## 2020-01-12 RX ADMIN — SENNOSIDES AND DOCUSATE SODIUM 2 TABLET: 8.6; 5 TABLET ORAL at 08:47

## 2020-01-12 RX ADMIN — Medication 10 ML: at 20:18

## 2020-01-12 RX ADMIN — ENOXAPARIN SODIUM 40 MG: 40 INJECTION SUBCUTANEOUS at 08:47

## 2020-01-12 RX ADMIN — SENNOSIDES AND DOCUSATE SODIUM 2 TABLET: 8.6; 5 TABLET ORAL at 20:17

## 2020-01-12 RX ADMIN — PANTOPRAZOLE SODIUM 40 MG: 40 TABLET, DELAYED RELEASE ORAL at 05:54

## 2020-01-12 RX ADMIN — ATORVASTATIN CALCIUM 80 MG: 80 TABLET, FILM COATED ORAL at 08:47

## 2020-01-12 ASSESSMENT — PAIN SCALES - GENERAL
PAINLEVEL_OUTOF10: 0
PAINLEVEL_OUTOF10: 0

## 2020-01-12 NOTE — PROGRESS NOTES
Senna S started and PRN M. O.M given with no bm noted thus far in shift. Active bowel sounds x 4, non distended, non tender abdomen. Will get PRN suppository order.

## 2020-01-13 LAB
ANION GAP SERPL CALCULATED.3IONS-SCNC: 14 MMOL/L (ref 3–16)
BUN BLDV-MCNC: 14 MG/DL (ref 7–20)
CALCIUM SERPL-MCNC: 9 MG/DL (ref 8.3–10.6)
CHLORIDE BLD-SCNC: 107 MMOL/L (ref 99–110)
CO2: 23 MMOL/L (ref 21–32)
CREAT SERPL-MCNC: 1 MG/DL (ref 0.8–1.3)
GFR AFRICAN AMERICAN: >60
GFR NON-AFRICAN AMERICAN: >60
GLUCOSE BLD-MCNC: 93 MG/DL (ref 70–99)
HCT VFR BLD CALC: 49.4 % (ref 40.5–52.5)
HEMOGLOBIN: 16.2 G/DL (ref 13.5–17.5)
MCH RBC QN AUTO: 30.4 PG (ref 26–34)
MCHC RBC AUTO-ENTMCNC: 32.8 G/DL (ref 31–36)
MCV RBC AUTO: 92.6 FL (ref 80–100)
PDW BLD-RTO: 13.7 % (ref 12.4–15.4)
PLATELET # BLD: 163 K/UL (ref 135–450)
PLATELET SLIDE REVIEW: ADEQUATE
PMV BLD AUTO: 9.4 FL (ref 5–10.5)
POTASSIUM SERPL-SCNC: 4.7 MMOL/L (ref 3.5–5.1)
RBC # BLD: 5.34 M/UL (ref 4.2–5.9)
SLIDE REVIEW: NORMAL
SODIUM BLD-SCNC: 144 MMOL/L (ref 136–145)
WBC # BLD: 9.1 K/UL (ref 4–11)

## 2020-01-13 PROCEDURE — 1280000000 HC REHAB R&B

## 2020-01-13 PROCEDURE — 85027 COMPLETE CBC AUTOMATED: CPT

## 2020-01-13 PROCEDURE — 97530 THERAPEUTIC ACTIVITIES: CPT

## 2020-01-13 PROCEDURE — 97130 THER IVNTJ EA ADDL 15 MIN: CPT

## 2020-01-13 PROCEDURE — 6370000000 HC RX 637 (ALT 250 FOR IP): Performed by: PHYSICAL MEDICINE & REHABILITATION

## 2020-01-13 PROCEDURE — 2580000003 HC RX 258: Performed by: PHYSICAL MEDICINE & REHABILITATION

## 2020-01-13 PROCEDURE — 97535 SELF CARE MNGMENT TRAINING: CPT

## 2020-01-13 PROCEDURE — 97129 THER IVNTJ 1ST 15 MIN: CPT

## 2020-01-13 PROCEDURE — 6360000002 HC RX W HCPCS: Performed by: PHYSICAL MEDICINE & REHABILITATION

## 2020-01-13 PROCEDURE — 36415 COLL VENOUS BLD VENIPUNCTURE: CPT

## 2020-01-13 PROCEDURE — 97112 NEUROMUSCULAR REEDUCATION: CPT

## 2020-01-13 PROCEDURE — 97116 GAIT TRAINING THERAPY: CPT

## 2020-01-13 PROCEDURE — 80048 BASIC METABOLIC PNL TOTAL CA: CPT

## 2020-01-13 RX ORDER — POLYETHYLENE GLYCOL 3350 17 G/17G
17 POWDER, FOR SOLUTION ORAL DAILY
Status: DISCONTINUED | OUTPATIENT
Start: 2020-01-13 | End: 2020-01-22 | Stop reason: HOSPADM

## 2020-01-13 RX ORDER — METHYLPHENIDATE HYDROCHLORIDE 10 MG/1
5 TABLET ORAL 2 TIMES DAILY
Status: DISCONTINUED | OUTPATIENT
Start: 2020-01-13 | End: 2020-01-15

## 2020-01-13 RX ADMIN — FLUOXETINE 20 MG: 20 CAPSULE ORAL at 10:08

## 2020-01-13 RX ADMIN — METHYLPHENIDATE HYDROCHLORIDE 5 MG: 10 TABLET ORAL at 15:19

## 2020-01-13 RX ADMIN — PANTOPRAZOLE SODIUM 40 MG: 40 TABLET, DELAYED RELEASE ORAL at 06:58

## 2020-01-13 RX ADMIN — CARVEDILOL 6.25 MG: 6.25 TABLET, FILM COATED ORAL at 18:07

## 2020-01-13 RX ADMIN — Medication 10 ML: at 10:08

## 2020-01-13 RX ADMIN — METHYLPHENIDATE HYDROCHLORIDE 5 MG: 10 TABLET ORAL at 10:11

## 2020-01-13 RX ADMIN — ASPIRIN 81 MG 81 MG: 81 TABLET ORAL at 10:08

## 2020-01-13 RX ADMIN — ATORVASTATIN CALCIUM 80 MG: 80 TABLET, FILM COATED ORAL at 10:08

## 2020-01-13 RX ADMIN — ENOXAPARIN SODIUM 40 MG: 40 INJECTION SUBCUTANEOUS at 10:08

## 2020-01-13 RX ADMIN — CLOPIDOGREL 75 MG: 75 TABLET, FILM COATED ORAL at 10:08

## 2020-01-13 RX ADMIN — POLYETHYLENE GLYCOL 3350 17 G: 17 POWDER, FOR SOLUTION ORAL at 21:50

## 2020-01-13 RX ADMIN — SENNOSIDES AND DOCUSATE SODIUM 2 TABLET: 8.6; 5 TABLET ORAL at 21:48

## 2020-01-13 RX ADMIN — QUETIAPINE FUMARATE 50 MG: 25 TABLET ORAL at 21:48

## 2020-01-13 RX ADMIN — SENNOSIDES AND DOCUSATE SODIUM 2 TABLET: 8.6; 5 TABLET ORAL at 10:08

## 2020-01-13 NOTE — PROGRESS NOTES
Physical Therapy  Facility/Department: [facility] ACUTE [facility]  Daily Treatment Note  NAME: Jonh Saldivar  : 1960  MRN: [id number]    Date of Service: 2020    Discharge Recommendations:  Home with Home health PT, 24 hour supervision or assist   PT Equipment Recommendations  Equipment Needed: Yes  Other: TBD with progress     Assessment   Body structures, Functions, Activity limitations: Decreased functional mobility ; Decreased strength;Decreased endurance;Decreased ADL status; Decreased safe awareness;Decreased sensation;Decreased cognition;Decreased balance;Decreased posture;Decreased coordination  Assessment: Pt presenting with significant improvement in transfers and ambulation on this date. Pt noted to have increased use of (R) LE/UE but remains. Patient remains inattentive to (R) side and decreased insight to current deficits. Pt would benefit from continued skilled PT services to regain baseline functional independence. Treatment Diagnosis: R inattention, weakness, impaired sensation secondary to CVA  Prognosis: Good  PT Education: Goals;PT Role;Plan of Care;General Safety;Transfer Training;Orientation;Gait Training;Functional Mobility Training  Barriers to Learning: cognitive  REQUIRES PT FOLLOW UP: Yes  Activity Tolerance  Activity Tolerance: Patient Tolerated treatment well;Patient limited by cognitive status  Activity Tolerance: Requires consistent safety cueing secondary to impulsive behavior during mobility tasks. Moderate fatigue noted between 1st and 2nd session. Patient Diagnosis(es): CVA   has a past medical history of CAD (coronary artery disease), Chronic kidney disease, Collapsed lung, GERD (gastroesophageal reflux disease), Hematuria, Hyperlipidemia, Hypertension, ICD (implantable cardioverter-defibrillator) in place, Mural thrombus of left ventricle, and Tortuous aorta ([address] has a past surgical history that includes Colon surgery; Cholecystectomy, open;  Arm Debridement (10-26-10); Cardiac surgery; Colonoscopy (07/18/2016); pacemaker placement (Left, 2013); Upper gastrointestinal endoscopy (05/01/2017); Upper gastrointestinal endoscopy (06/27/2017); and Colonoscopy (11/20/2019). Restrictions  Restrictions/Precautions  Restrictions/Precautions: Fall Risk  Required Braces or Orthoses?: No  Position Activity Restriction  Other position/activity restrictions: 55-year-old male with a history of CAD, CK-MB, HTN, HLD, and status post ICD placement who was admitted on 1/3 with acute right-sided weakness. CTH revealed acute left thalamic and temporal lobe infarcts. CTA revealed an occlusion of the left PCA. echo with an EF of 35 percent and negative bubble study. . No A1c reported. Of note he did have a UDS which was positive for amphetamines. He was evaluated by neurology who considered anticoagulation but declined due to prior non-adherence. He was evaluated by therapy and suggested to continue an inpatient setting prior to returning home. Subjective   General  Chart Reviewed: Yes  Response To Previous Treatment: Not applicable  Family / Caregiver Present: No(Father present upon arrival, does not attend session. )  Subjective  Subjective: Pt denies pain. Agreeable to therapy session. General Comment  Comments: Pt seated in recliner upon arrival.    Pain Screening  Patient Currently in Pain: Denies  Vital Signs  Patient Currently in Pain: Denies       Cognition   Cognition  Overall Cognitive Status: Exceptions  Arousal/Alertness: Delayed responses to stimuli  Following Commands: Follows one step commands with increased time; Follows one step commands with repetition  Attention Span: Difficulty dividing attention  Memory: Decreased recall of recent events  Safety Judgement: Decreased awareness of need for safety;Decreased awareness of need for assistance  Problem Solving: Decreased awareness of errors  Insights: Decreased awareness of deficits  Initiation: Requires cues for ambulate 50+ ft with LRAD and supervision   Long term goal 4: pt able to navigate 4 steps with handrailing and supervision   Patient Goals   Patient goals : To go home    Plan    Plan  Times per week: 5 of 7 days per week for 60 minutes per day   Times per day: Daily  Current Treatment Recommendations: Strengthening, Functional Mobility Training, Transfer Training, Balance Training, Endurance Training, Stair training, Gait Training, Neuromuscular Re-education, Wheelchair Mobility Training, Safety Education & Training, Patient/Caregiver Education & Training, Manual Therapy - Joint Manipulation  Safety Devices  Type of devices:  All fall risk precautions in place, Call light within reach, Chair alarm in place, Left in chair, Nurse notified, Jeramie Pedraza in use  Restraints  Initially in place: No     Therapy Time   Individual Concurrent Group Co-treatment   Time In       0830   Time Out       0930   Minutes       60   Timed Code Treatment Minutes: 60 Minutes     Second Session Therapy Time:   Individual Concurrent Group Co-treatment   Time In       1115   Time Out       1145   Minutes       30     Timed Code Treatment Minutes:  60 + 30     Total Treatment Minutes:  90 minutes    Jossue Caruso PT, DPT - [id number]   co-tx completed on this date to maximize functional mobility and maintain patient safety

## 2020-01-13 NOTE — PLAN OF CARE
Problem: Falls - Risk of:  Goal: Will remain free from falls  Description  Will remain free from falls  Outcome: Ongoing  Note:   Pt remains free from falls. Safety precautions in place. Bed in lowest position, bed/chair wheels locked, call light with in reach, bed/chair alarm on, fall risk wrist band on, SAFE outside of doorway. Will continue to monitor.

## 2020-01-13 NOTE — PROGRESS NOTES
Occupational Therapy  Facility/Department: [facility] ACUTE REHAB UN[facility]  Daily Treatment Note  NAME: Milton Iniguez  : 1960  MRN: [id number]    Date of Service: 2020    Discharge Recommendations:  Continue to assess pending progress, Patient would benefit from continued therapy after discharge  OT Equipment Recommendations  Equipment Needed: Yes  Other: Pt may require grab bars in shower, grab bars around toilet, shower chair    Assessment   Performance deficits / Impairments: Decreased functional mobility ; Decreased ROM; Decreased strength;Decreased sensation;Decreased balance;Decreased ADL status; Decreased safe awareness;Decreased coordination;Decreased cognition;Decreased vision/visual deficit  Assessment: 62 y.o. male presents w/ the above deficits which are impacing her occupational performance. Pt would benefit from continued therapy during inpatient stay in order to maximize safety and independence upon discharge   Treatment Diagnosis: Multiple performance deficits s/p CVA  Prognosis: Fair  OT Education: ADL Adaptive Strategies;Transfer Training;Equipment  REQUIRES OT FOLLOW UP: Yes  Activity Tolerance  Activity Tolerance: Patient Tolerated treatment well  Activity Tolerance: Pt impulsive w/ movement, requires redirection to task and is easily distracted; Pt frequently declined rest break requiring encouragement to do so even when pt was visibly fatigued demonstrating decreased insight/poor safety awareness. Safety Devices  Safety Devices in place: Yes  Type of devices: Left in chair;Call light within reach; Chair alarm in place; All fall risk precautions in place         Patient Diagnosis(es): CVA     has a past medical history of CAD (coronary artery disease), Chronic kidney disease, Collapsed lung, GERD (gastroesophageal reflux disease), Hematuria, Hyperlipidemia, Hypertension, ICD (implantable cardioverter-defibrillator) in place, Mural thrombus of left ventricle, and Tortuous aorta ([address]    has a of grab bar. Pt completed UB/LB bathing seated on BSC placed in shower, pt completed stance for posterior pericare w/ use of grab bar and Abelardo x1 + CGAx1 for balance and assist for pericare thoroughness (pt incontinent of BM). Pt completed UB/LB dressing seated on BSC, VCs for dressing L UE/LE first- pt demonstrates decreased ability to follow directives and required cues for attention to task and safety throughout. Balance  Sitting Balance: Minimal assistance(CGA-Abelardo)  Standing Balance: Moderate assistance(Min-ModA)  Standing Balance  Time: ~12min total  Activity: transfers, ADLs, functional mobility, dynamic standing activity    Functional Mobility  Functional - Mobility Device: Other  Activity: Other  Assist Level: Maximum assistance  Functional Mobility Comments: Pt ambulated w/ hand rail on L side in hallway ~10ft w/ SBA-CGAx1 + Abelardo x1, verbal and tactile cues to advance R LE. Pt ambulated w/ RW and Mod-MaxAx1 + CGA-MinAx1, 2 reps of activity w/ R hand splint added to RW during second ambulation, ~40ft total; PT to assist w/ weight shifting and advancing R LE, OT to assist w/ RW management, upright posture, and R UE placement/management. Shower Transfers  Shower - Transfer From: Wheelchair  Shower - Transfer Type: To and From  Shower - Transfer To: Other(BSC placed in shower)  Shower - Technique: Stand pivot  Shower Transfers: Moderate assistance  Shower Transfers Comments: Min-ModA, Verbal cues for technique/safety, use of grab bars     Transfers  Stand Pivot Transfers: Moderate assistance(variable)  Sit to stand: Moderate assistance  Stand to sit: Moderate assistance  Transfer Comments: Pt varried Min-ModAx1 and Min-ModA x1 w/ Abelardo-CGAx1        Cognition  Overall Cognitive Status: Exceptions  Arousal/Alertness: Delayed responses to stimuli  Following Commands: Follows one step commands with increased time; Follows one step commands with repetition  Attention Span: Difficulty dividing

## 2020-01-13 NOTE — PROGRESS NOTES
Noncompliance with meds/ substance abuse, and Unrealistic expectations  Discharge Recommendations:  [] Home independently  [] Home with assistance []  24 hour supervision  [] SNF [x] Other: TBD  Continued SLP Treatment:  [x] Yes [] No [] TBD based on progress while on ARU [] Vital Stim indicated [] Other:   Estimated discharge date: 1/28/20     Type of Total Treatment Minutes   Session 1   Session 2 Session 3    Time In 0800 0930 1250   Time Out 0830  1000 1320   Timed Code Minutes  30 30 30   Individual Treatment Minutes  30 30  30   Co-Treatment Minutes       Group Treatment Minutes       Concurrent Treatment Minutes         TOTAL DAILY MINUTES:  90  30 additional minutes due to missed time on 1/10/20 for medical hold. 30 additional minutes to be scheduled tomorrow, 1/14/20. Electronically Signed by     [facility] [address] [place] M. A. CCC-SLP #[id number] 1/13/2020 2:29 PM  Speech-Language Pathologist    Session 3  Hernan Oliveira M.A. CCC-SLP S.PBenny [id number]  Speech-Language Pathologist [phone]  1/13/2020 3:15 PM

## 2020-01-13 NOTE — PROGRESS NOTES
Meryle Dory  1/13/2020  [id number]    Chief Complaint: Acute ischemic stroke [facility])    Subjective:   [facility]. No trauma. Agitated yesterday. Step-father present and details \"some guys from [place]\" were calling his phone. They had previously come to the hospital and agitated the patient following. They have apparently come asking for something from the patient. Stepfather reporting meth abuse and cooking meth resumed recently following state [facility] release in 2016 for meth. ROS: No CP, SOB, dyspnea    Objective:  Patient Vitals for the past 24 hrs:   BP Temp Temp src Pulse Resp SpO2   01/12/20 2016 121/82 98.1 °F (36.7 °C) Oral 65 16 99 %     Gen: No distress, pleasant. Resting in bedside chair  HEENT: Normocephalic, atraumatic. CV: Regular rate and rhythm. No MRG   Resp: No respiratory distress. CTAB   Abd: Soft, nontender nondistended  Ext: No edema. Neuro: Alert, oriented, appropriately interactive. Right sided neglect but improving, RUE 4/5, RLE 2/5, 2/4 MAS    Laboratory data: Available via EMR. Therapy progress:  PT  Position Activity Restriction  Other position/activity restrictions: 78-year-old male with a history of CAD, CK-MB, HTN, HLD, and status post ICD placement who was admitted on 1/3 with acute right-sided weakness. CTH revealed acute left thalamic and temporal lobe infarcts. CTA revealed an occlusion of the left PCA. echo with an EF of 35 percent and negative bubble study. . No A1c reported. Of note he did have a UDS which was positive for amphetamines. He was evaluated by neurology who considered anticoagulation but declined due to prior non-adherence. He was evaluated by therapy and suggested to continue an inpatient setting prior to returning home. Objective     Sit to Stand:  Moderate Assistance(mod A without grab bar, min A within shower using grab bar)  Stand to sit: Moderate Assistance(for controlled lowering)  Bed to Chair: Dependent/Total  Device: Parallel Bars  Assistance: Dependent/Total(Max A of RLE advancement + min A for balance and movement of RUE)  Distance: 5 ft x 2   OT  PT Equipment Recommendations  Equipment Needed: Yes  Other: TBD with progress      Assessment        SLP  Current Diet : Dysphagia Soft and Bite-Sized (Dysphagia III)  Current Liquid Diet : Thin  Diet Solids Recommendation: Dysphagia Soft and Bite-Sized (Dysphagia III)  Liquid Consistency Recommendation: Thin    Body mass index is 25.13 kg/m². Assessment:  Patient Active Problem List   Diagnosis    Cellulitis    Hyponatremia    Arm pain    Essential hypertension    Ischemic chest pain (HCC)    Mixed hyperlipidemia    ARF (acute renal failure) (HCC)    Automatic implantable cardioverter-defibrillator in situ    Tobacco abuse    Primary cardiomyopathy ([facility]    CAD (coronary artery disease)    Ischemic chest pain ([address]    Unstable angina ([address]    Chest pain    Cocaine abuse ([facility]    Ischemic cardiomyopathy    Hematochezia    Acute cerebrovascular accident (CVA) ([facility]    Oropharyngeal dysphagia    Organic mood disorder    Current episode of major depressive disorder without prior episode    Acute ischemic stroke ([facility]       Plan:   Acute left thalamic and temporal lobe infarcts: PT/OT for hemiplegia  Large right frontal lobe encephalomalacia: question stimulant abuse as coping mechanism for focus and attention. - trial low-dose ritalin. Prozac for motor recovery     Hypotension: s/p IVF, - space labs     Agitation: environmental precautions. Ativan PRN.  Scheduled seroquel HS      Dysphagia: SLP     HLD: lipitor 80     HTN: coreg     CAD: ASA, statin     Cardiomyopathy: EF 35%, daily weights, coreg - resume     Amphetamine abuse: education     Tobacco abuse: nicoderm 14      Bowels: Per protocol  Bladder: Per protocol   Sleep: Seroquel  Pain: tylenol PRN  DVT PPx: Lovenox     Luigi Kidney, MD 1/13/2020, 9:10 AM    * This document was created using dictation software. While all precautions were taken to ensure accuracy, errors may have occurred. Please disregard any typographical errors.

## 2020-01-13 NOTE — PATIENT CARE CONFERENCE
[facility]  Inpatient Rehabilitation  Weekly Team Conference Note    Patient Name: Sang To        MRN: [id number]    : 1960  (62 y.o.)  Gender: male      Diagnosis: CVA    The team conference for this patient was held on 20 at 11:00am by:  Adelso Boone MD    CASE MANAGEMENT:  Assessment: Patient lives at home alone. PSYCHOLOGY:  Assessment:     PHYSICAL THERAPY:    Bed Mobility:   Scooting: Minimal assistance    Transfers:      Ambulation 1  Surface: level tile  Device: [place] rail  Other Apparatus: Left  Assistance: Minimal assistance(CGA/min (A))  Quality of Gait: Step to pattern with narrow KATE and reduced lateral weight shifting. Without cueing has scissoring gait pattern. With VC able to increase step width with improved foot placement.     Distance: 25 ft    Stairs  # Steps : 1  Stairs Height: 4\"  Rails: Bilateral  Assistance: Dependent/Total(max A of 2)  Comment: impulsive and not following commands (R vs L),  max A to lift RLE onto/off step (RLE adducted across midline), cues at R glut and knee for extension     QM:  Roll Left and Right  Assistance Needed: Partial/moderate assistance  CARE Score: 3  Discharge Goal: Independent  Sit to Lying  Assistance Needed: Partial/moderate assistance  CARE Score: 3  Discharge Goal: Independent  Lying to Sitting on Side of Bed  Assistance Needed: Partial/moderate assistance  CARE Score: 3  Discharge Goal: Independent  Sit to Stand  Assistance Needed: Partial/moderate assistance  CARE Score: 3  Discharge Goal: Supervision or touching assistance  Chair/Bed-to-Chair Transfer  Assistance Needed: Substantial/maximal assistance  CARE Score: 2  Discharge Goal: Supervision or touching assistance  Car Transfer  Reason if not Attempted: Not attempted due to medical condition or safety concerns  CARE Score: 88  Walk 10 Feet  Assistance Needed: Dependent  CARE Score: 1  Discharge Goal: Supervision or touching assistance  Walk 50 Feet placed in shower, use of grab bar. Pt completed UB/LB bathing seated on BSC placed in shower, pt completed stance for posterior pericare w/ use of grab bar and Abelardo x1 + CGAx1 for balance and assist for pericare thoroughness (pt incontinent of BM). Pt completed UB/LB dressing seated on BSC, VCs for dressing L UE/LE first- pt demonstrates decreased ability to follow directives and required cues for attention to task and safety throughout. Toilet Transfers:       Tub/ShowerTransfers:     Shower Transfers  Shower - Transfer From: Wheelchair  Shower - Transfer Type: To and From  Shower - Transfer To: Other(BSC placed in shower)  Shower - Technique: Stand pivot  Shower Transfers: Moderate assistance  Shower Transfers Comments: Min-ModA, Verbal cues for technique/safety, use of grab bars    QM:  Eating  Assistance Needed: Setup or clean-up assistance  CARE Score: 5  Discharge Goal: Independent  Oral Hygiene  Assistance Needed: Partial/moderate assistance  CARE Score: 3  Discharge Goal: Independent  Toileting Hygiene  Assistance Needed: Dependent  Reason if not Attempted: Not applicable(incont once, urinal once)  CARE Score: 1  Discharge Goal: Independent  Toilet Transfer  Assistance Needed: Dependent  Reason if not Attempted: Not applicable  CARE Score: 1  Discharge Goal: Supervision or touching assistance  Shower/Bathe Self  Assistance Needed: Partial/moderate assistance  CARE Score: 3  Discharge Goal: Independent  Upper Body Dressing  Assistance Needed: Partial/moderate assistance  CARE Score: 3  Discharge Goal: Independent  Lower Body Dressing  Assistance Needed: Partial/moderate assistance  CARE Score: 3  Discharge Goal: Independent  Putting On/Taking Off Footwear  Assistance Needed: Dependent  CARE Score: 1  Discharge Goal: Independent    NUTRITION:  Weight: 185 lb 4.8 oz (84.1 kg) / Body mass index is 25.13 kg/m².     Diet Order:Dysphagia soft & bite-sized, no straws    Supplements:NA    Pt is adequately nourished AEB po intake consistently greater than 50% of meals & pt has had no significant wt changes. Pt is tolerating Dys soft & bite-sized diet per SLP rec's. Please see nutrition note for details. NURSING:  FIMS:       Tammy Ovalles Fall Risk Score: high  Wounds/Incisions/Ulcers: none  Medication Review: reviewed with pt daily  Pain: rare, ativan for agitation prn  Consultations/Labs/X-rays: CBC BMP MWF           Risk for Readmission: 21%  Critical Items: If High Risk, consider the following recommendations:Home Health Nursing    Patient/Family Education provided by team: functional transfer training, ADL safety     Discharge Plan   Estimated Length of Stay: 2 weeks  Destination: home health  Pass:No  Services at Discharge: Other , [facility] OT/PT S3  Equipment at Discharge: shower chair, grab bars (shower/toilet), hand held shower head, RW pending progress  Factors facilitating achievement of predicted outcomes: PLOF  Barriers to the achievement of predicted outcomes: decreased awareness of deficits  Patient Goals: To go home, \"To go home\"    Rehab Team Members in attendance for Team Conference:  Veronica Myles, MSW, LSW  Christen Gale RD, DIPTI    [place], [place]. D, Neuropsychologist    Alber Ramos, OTR/L    Wolfgang Garcia, PT, DPT    Brandon Prasad M.A., [facility]-SLP    Jason Mcdaniels, MSN, RN, [facility] [address],     I approve the established interdisciplinary plan of care as documented within the medical record of Abdiaziznishant Buciodavid.     Nelia Schmidt MD  Electronically signed by Nelia Schmidt MD on 1/14/2020 at 11:24 AM

## 2020-01-14 PROCEDURE — 1280000000 HC REHAB R&B

## 2020-01-14 PROCEDURE — 97110 THERAPEUTIC EXERCISES: CPT

## 2020-01-14 PROCEDURE — 97116 GAIT TRAINING THERAPY: CPT

## 2020-01-14 PROCEDURE — 6370000000 HC RX 637 (ALT 250 FOR IP): Performed by: PHYSICAL MEDICINE & REHABILITATION

## 2020-01-14 PROCEDURE — 97130 THER IVNTJ EA ADDL 15 MIN: CPT

## 2020-01-14 PROCEDURE — 97530 THERAPEUTIC ACTIVITIES: CPT

## 2020-01-14 PROCEDURE — 92526 ORAL FUNCTION THERAPY: CPT

## 2020-01-14 PROCEDURE — 97112 NEUROMUSCULAR REEDUCATION: CPT

## 2020-01-14 PROCEDURE — 97129 THER IVNTJ 1ST 15 MIN: CPT

## 2020-01-14 PROCEDURE — 6360000002 HC RX W HCPCS: Performed by: PHYSICAL MEDICINE & REHABILITATION

## 2020-01-14 RX ORDER — CARVEDILOL 3.12 MG/1
3.12 TABLET ORAL 2 TIMES DAILY WITH MEALS
Status: DISCONTINUED | OUTPATIENT
Start: 2020-01-14 | End: 2020-01-22 | Stop reason: HOSPADM

## 2020-01-14 RX ADMIN — SENNOSIDES AND DOCUSATE SODIUM 2 TABLET: 8.6; 5 TABLET ORAL at 20:55

## 2020-01-14 RX ADMIN — CARVEDILOL 6.25 MG: 6.25 TABLET, FILM COATED ORAL at 08:00

## 2020-01-14 RX ADMIN — METHYLPHENIDATE HYDROCHLORIDE 5 MG: 10 TABLET ORAL at 08:00

## 2020-01-14 RX ADMIN — POLYETHYLENE GLYCOL 3350 17 G: 17 POWDER, FOR SOLUTION ORAL at 07:59

## 2020-01-14 RX ADMIN — ASPIRIN 81 MG 81 MG: 81 TABLET ORAL at 07:59

## 2020-01-14 RX ADMIN — METHYLPHENIDATE HYDROCHLORIDE 5 MG: 10 TABLET ORAL at 12:07

## 2020-01-14 RX ADMIN — CLOPIDOGREL 75 MG: 75 TABLET, FILM COATED ORAL at 08:00

## 2020-01-14 RX ADMIN — QUETIAPINE FUMARATE 50 MG: 25 TABLET ORAL at 20:55

## 2020-01-14 RX ADMIN — FLUOXETINE 20 MG: 20 CAPSULE ORAL at 07:59

## 2020-01-14 RX ADMIN — ENOXAPARIN SODIUM 40 MG: 40 INJECTION SUBCUTANEOUS at 08:01

## 2020-01-14 RX ADMIN — PANTOPRAZOLE SODIUM 40 MG: 40 TABLET, DELAYED RELEASE ORAL at 06:36

## 2020-01-14 RX ADMIN — SENNOSIDES AND DOCUSATE SODIUM 2 TABLET: 8.6; 5 TABLET ORAL at 08:01

## 2020-01-14 RX ADMIN — ATORVASTATIN CALCIUM 80 MG: 80 TABLET, FILM COATED ORAL at 07:59

## 2020-01-14 ASSESSMENT — PAIN SCALES - GENERAL
PAINLEVEL_OUTOF10: 0

## 2020-01-14 NOTE — PROGRESS NOTES
Occupational Therapy  Facility/Department: [facility] ACUTE REHAB UNIT  Daily Treatment Note  NAME: Carmen Mckeon  : 1960  MRN: [id number]    Date of Service: 2020    Discharge Recommendations:  Continue to assess pending progress, Patient would benefit from continued therapy after discharge  OT Equipment Recommendations  Other: Pt may require grab bars in shower, grab bars around toilet, shower chair    Assessment   Performance deficits / Impairments: Decreased functional mobility ; Decreased ROM; Decreased strength;Decreased sensation;Decreased balance;Decreased ADL status; Decreased safe awareness;Decreased coordination;Decreased cognition;Decreased vision/visual deficit  Assessment: 62 y.o. male presents w/ the above deficits which are impacing her occupational performance. Pt would benefit from continued therapy during inpatient stay in order to maximize safety and independence upon discharge   Treatment Diagnosis: Multiple performance deficits s/p CVA  Prognosis: Fair  OT Education: Transfer Training;OT Role  Patient Education: vc attend to RUE  REQUIRES OT FOLLOW UP: Yes  Activity Tolerance  Activity Tolerance: Patient Tolerated treatment well  Safety Devices  Safety Devices in place: Yes  Type of devices: Left in chair;Call light within reach; Chair alarm in place; All fall risk precautions in place         Patient Diagnosis(es): There were no encounter diagnoses. has a past medical history of CAD (coronary artery disease), Chronic kidney disease, Collapsed lung, GERD (gastroesophageal reflux disease), Hematuria, Hyperlipidemia, Hypertension, ICD (implantable cardioverter-defibrillator) in place, Mural thrombus of left ventricle, and Tortuous aorta ([address] has a past surgical history that includes Colon surgery; Cholecystectomy, open; Arm Debridement (10-26-10); Cardiac surgery; Colonoscopy (2016); pacemaker placement (Left, ); Upper gastrointestinal endoscopy (2017);  Upper gastrointestinal endoscopy (06/27/2017); and Colonoscopy (11/20/2019). Restrictions  Restrictions/Precautions  Restrictions/Precautions: Fall Risk  Required Braces or Orthoses?: No  Position Activity Restriction  Other position/activity restrictions: 68-year-old male with a history of CAD, CK-MB, HTN, HLD, and status post ICD placement who was admitted on 1/3 with acute right-sided weakness. CTH revealed acute left thalamic and temporal lobe infarcts. CTA revealed an occlusion of the left PCA. echo with an EF of 35 percent and negative bubble study. . No A1c reported. Of note he did have a UDS which was positive for amphetamines. He was evaluated by neurology who considered anticoagulation but declined due to prior non-adherence. He was evaluated by therapy and suggested to continue an inpatient setting prior to returning home. Subjective   General  Chart Reviewed: Yes  Patient assessed for rehabilitation services?: Yes  Additional Pertinent Hx: CAD, GERD, Hematuria, HTN, Hyperlipidemia, CAD, CKD, Pacemaker, ICD, Collapsed Lung   Response to previous treatment: Patient with no complaints from previous session  Family / Caregiver Present: No  Referring Practitioner: Carlos Browne MD  Diagnosis: CVA   Subjective  Subjective: Pt sidelying left in bed upon entry. Pt pleasant, denies pain and agreeable to therapy session. Vital Signs  Patient Currently in Pain: Denies   Objective     Balance  Sitting Balance: Stand by assistance(static EOB)  Standing Balance: Minimal assistance  Standing Balance  Time: ~15 sec  Activity: stand pivotg  Bed mobility  Supine to Sit: Minimal assistance  Transfers  Stand Pivot Transfers: Minimal assistance  Sit to stand: Minimal assistance  Stand to sit: Minimal assistance       General Comment  Comments: Pt supine to sit Min A. Pt sit EOB SBA. Pt sit to stand/stand pivot/stand to sit Min A.  Pt seated in chair performed the following bilateral exercises with 2# weighted front of him, pulled ladder back x5 reps, picked up ladder and rotated L x5 reps, picked up ladder and rotated x5 reps. PT present for balance, HOHA from this writer to  ladder. Pt then completed step ups w/ RUE on ladder, HOHA w/ RUE to  ladder and facilitate weight bearing at forearm. Pt completed x5 reps x2. Pt unable to complete step up w/ LLE. Pt reporting fatigue at end of session ,requesting to get into bed. Pt transported back to room at end of session. SPT>EOB w/ Min A. Pt supine in bed at end of session, bed alarm on, needs within reach. Plan   Plan  Times per week: 60 min; 5-7x week  Times per day: Daily  Current Treatment Recommendations: Strengthening, Balance Training, ROM, Functional Mobility Training, Neuromuscular Re-education, Safety Education & Training, Self-Care / ADL, Patient/Caregiver Education & Training, Cognitive/Perceptual Training    Goals  Short term goals  Time Frame for Short term goals: 7-10 days   Short term goal 1: Bathing w/ Min A   Short term goal 2: UB dressing w/ Min A  Short term goal 3: LB dressing w/ Min A  Short term goal 4: Functional transfer for ADL completion w/ Min A  Short term goal 5: Toileting w/ Min A  Long term goals  Time Frame for Long term goals : 3 weeks   Long term goal 1: Bathing w/ Mod I  Long term goal 2: UB dressing w/ Mod I  Long term goal 3: LB dressing w/ Mod I  Long term goal 4: Functional transfer for ADL completion w/ Mod I  Long term goal 5: Toileting w/ Mod I  Patient Goals   Patient goals :  \"To go home\"       Therapy Time   Individual Concurrent Group Co-treatment   Time In 1030         Time Out 1100         Minutes 30         Timed Code Treatment Minutes: [address], [place] [id number] (Treatment and documentation 1st therapy session)   Cosign: Taylor PENA/ISAAC, MOT #[id number]      Second Session Therapy Time:   Individual Concurrent Group Co-treatment   Time In    4240   Time Out    1415   Minutes    30      Timed Code Treatment Minutes:  30 + 30     Total Treatment Minutes:  60 minutes     Ryan PENA/L, [address] [facility] #[id number]

## 2020-01-14 NOTE — PROGRESS NOTES
Physical Therapy  Facility/Department: [facility] ACUTE REHAB UNIT  Daily Treatment Note  NAME: Jonh Saldivar  : 1960  MRN: [id number]    Date of Service: 2020    Discharge Recommendations:  Home with Home health PT, 24 hour supervision or assist   PT Equipment Recommendations  Equipment Needed: Yes  Other: TBD with progress     Assessment   Body structures, Functions, Activity limitations: Decreased functional mobility ; Decreased strength;Decreased endurance;Decreased ADL status; Decreased safe awareness;Decreased sensation;Decreased cognition;Decreased balance;Decreased posture;Decreased coordination  Assessment: Pt continues to make consistent progress including improved stability during transfers, stairs, and ambulation on this date. Pt noted to have increased use of (R) LE/UE but remains. Patient remains inattentive to (R) side and decreased insight to current deficits. Pt would benefit from continued skilled PT services to regain baseline functional independence. Treatment Diagnosis: R inattention, weakness, impaired sensation secondary to CVA  Prognosis: Good  PT Education: Goals;PT Role;Plan of Care;General Safety;Transfer Training;Orientation;Gait Training;Functional Mobility Training  Patient Education: Educated on need for staff assist with all mobility tasks. Barriers to Learning: cognitive, decreased insight to deficits  REQUIRES PT FOLLOW UP: Yes  Activity Tolerance  Activity Tolerance: Patient Tolerated treatment well;Patient limited by cognitive status  Activity Tolerance: Requires consistent safety cueing secondary to impulsive behavior during mobility tasks. Decreased insight to current deficits and attention to (R) side.      Patient Diagnosis(es): CVA   has a past medical history of CAD (coronary artery disease), Chronic kidney disease, Collapsed lung, GERD (gastroesophageal reflux disease), Hematuria, Hyperlipidemia, Hypertension, ICD (implantable cardioverter-defibrillator) in

## 2020-01-14 NOTE — CARE COORDINATION
Faxed clinicals [facility] for extended stay review.     Fax #:  [phone]  [facility] #: [id number]

## 2020-01-14 NOTE — PROGRESS NOTES
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 Pt denied pain. Pt denied pain. Subjective     Positioned up in chair for breakfast meal. Flat affect, poor pragmatics and short responses persist with pt being extremely impulsive with attempting to get up/ move when needing to go to the restroom during session and at end of session when requesting to get back into bed. Pt sitting up in wheelchair upon SLP arrival. Independently moved from wheelchair to bed without any verbal notion. Increased agitation noted during session regarding wanting to leave. Pt was able to be redirected with tasks. Pt asleep in bed upon SLP arrival. Toward end of session pt reported wanting to give up. Denied suicidal feelings. Encouraged use of psychological services. Pt again denied. Weekly Update: Pt with limited insight into deficits impacting his overall participation. Moderate cognitive deficits are evident. Pt provides quick short responses and avoids attention to detail. Mild dysarthria and dysphagia related to CVA and right sided weakness.     Objective:  Goals       The patient will tolerate recommended diet without observed clinical signs of aspiration.    - Pt tolerated breakfast meal despite mild impulsivity containing scrambled eggs, chopped sausage, and [place] toast with set up with no noted difficulty or overt s/s of aspiration. Effective oral clearance. - RN provided Multiple meds whole with water. SLP to assess for tolerance. Pt tolerated with no overt s/s of aspiration or pocketing. Allow meds whole with water. Goal not targeted this session. Attempted to provide trial of regular consistency solid to assess ability to upgrade diet. Pt denied despite encouragement. Pt had residue outside of oral cavity (on the right side) without awareness. Provided cues to remove. Ongoing - Continue to target, requires assessment of regular solids prior to upgrade. Denied during most recent session. The patient will recall and perform compensatory strategies, with no cues. No cues provided to assess for tolerance with pt tolerating entire meal despite mild impulsivity for rate/ bolus size. Goal not targeted this session. Goal not targeted this session. Ongoing    The patient will tolerate regular consistency solids 10/10. Goal not targeted this session.   - To assess pt with regular texture later this date   Goal not targeted this session. Goal not targeted this session. Ongoing    Pt will improve speech intelligibility in connected speech via graded tasks to 90% accuracy, min cues. Goal not targeted this session.    - Speech occasionally dysarthric in connected speech (not directly targeted)  Goal not targeted this session. GOAL MET- Speech is relatively intelligible in conversation although imprecise articulation is noted intermittently      Pt will improve auditory processing/comprehension of commands and questions to 80% accuracy, via graded tasks, with min cues.    - Yes/ No comparison questions- 12/15 (80%) initial presentation     - Followed fx commands with use of STEDY with RN/ SLP during session   - Responded to basic orientation questions  Goal not within reach  [] Chair alarm activated  [x] Bed alarm activated  [x] Other: Camera in place for safety. Room near the nurses station       Assessment:   Speech Therapy Diagnosis  Cognitive Diagnosis: Pt presents with moderate cognitive deficits characterized by poor pragmatics (severely flat affect and limited eye contact), perseverativeness during conversation, reduced STM and reduced executive functioning for problem-solving tasks  Aphasia Diagnosis: Pt presents with moderate receptive aphasia deficits characterized by difficulty with complex commands/questions, suspect cognitive deficits contribute to difficulty  Speech Diagnosis: Mild dysathria noted in connected speech/conversation    Current Diet Order: DIET DYSPHAGIA SOFT AND BITE-SIZED; No Drinking Straw   Recommended Form of Meds: Allow Meds whole with water   Compensatory Swallowing Strategies: Alternate solids and liquids, Small bites/sips, Remain upright for 30-45 minutes after meals, Upright as possible for all oral intake     Plan:     Frequency:  5days/week   60 minutes/day  Discharge Recommendations:   Barriers: Cognitive deficit, Impulsivity, Limited safety awareness, Limited insight into deficits, Behaviors, Noncompliance with meds/ substance abuse, and Unrealistic expectations    Discharge Recommendations:    [] Home independently   [] Home with assistance   [x]  24 hour supervision     [] SNF   [x] Other: TBD    Continued SLP Treatment:    [x] Yes   [] No   [] TBD based on progress while on ARU   [] Vital Stim indicated   [] Other:     Estimated discharge date: 1/28/20     Type of Total Treatment Minutes   Session 1   Session 2 Session 3    Time In 0800 0930 1248   Time Out 0830  1000 1320   Timed Code Minutes  15 30 32   Individual Treatment Minutes  30  30 32   Co-Treatment Minutes       Group Treatment Minutes       Concurrent Treatment Minutes         TOTAL DAILY MINUTES:  92  All time has been made up from missed session on 1/10/20.

## 2020-01-15 LAB
ANION GAP SERPL CALCULATED.3IONS-SCNC: 12 MMOL/L (ref 3–16)
BUN BLDV-MCNC: 20 MG/DL (ref 7–20)
CALCIUM SERPL-MCNC: 9.1 MG/DL (ref 8.3–10.6)
CHLORIDE BLD-SCNC: 105 MMOL/L (ref 99–110)
CO2: 24 MMOL/L (ref 21–32)
CREAT SERPL-MCNC: 0.9 MG/DL (ref 0.8–1.3)
GFR AFRICAN AMERICAN: >60
GFR NON-AFRICAN AMERICAN: >60
GLUCOSE BLD-MCNC: 99 MG/DL (ref 70–99)
HCT VFR BLD CALC: 45.7 % (ref 40.5–52.5)
HEMOGLOBIN: 15 G/DL (ref 13.5–17.5)
MCH RBC QN AUTO: 30.9 PG (ref 26–34)
MCHC RBC AUTO-ENTMCNC: 32.9 G/DL (ref 31–36)
MCV RBC AUTO: 94.1 FL (ref 80–100)
PDW BLD-RTO: 13.9 % (ref 12.4–15.4)
PLATELET # BLD: 226 K/UL (ref 135–450)
PMV BLD AUTO: 9.8 FL (ref 5–10.5)
POTASSIUM SERPL-SCNC: 4.1 MMOL/L (ref 3.5–5.1)
RBC # BLD: 4.85 M/UL (ref 4.2–5.9)
SODIUM BLD-SCNC: 141 MMOL/L (ref 136–145)
WBC # BLD: 7.7 K/UL (ref 4–11)

## 2020-01-15 PROCEDURE — 97530 THERAPEUTIC ACTIVITIES: CPT

## 2020-01-15 PROCEDURE — 97535 SELF CARE MNGMENT TRAINING: CPT

## 2020-01-15 PROCEDURE — 6360000002 HC RX W HCPCS: Performed by: PHYSICAL MEDICINE & REHABILITATION

## 2020-01-15 PROCEDURE — 80048 BASIC METABOLIC PNL TOTAL CA: CPT

## 2020-01-15 PROCEDURE — 97130 THER IVNTJ EA ADDL 15 MIN: CPT

## 2020-01-15 PROCEDURE — 97116 GAIT TRAINING THERAPY: CPT

## 2020-01-15 PROCEDURE — 36415 COLL VENOUS BLD VENIPUNCTURE: CPT

## 2020-01-15 PROCEDURE — 97129 THER IVNTJ 1ST 15 MIN: CPT

## 2020-01-15 PROCEDURE — 92526 ORAL FUNCTION THERAPY: CPT

## 2020-01-15 PROCEDURE — 83036 HEMOGLOBIN GLYCOSYLATED A1C: CPT

## 2020-01-15 PROCEDURE — 1280000000 HC REHAB R&B

## 2020-01-15 PROCEDURE — 97110 THERAPEUTIC EXERCISES: CPT

## 2020-01-15 PROCEDURE — 85027 COMPLETE CBC AUTOMATED: CPT

## 2020-01-15 PROCEDURE — 6370000000 HC RX 637 (ALT 250 FOR IP): Performed by: PHYSICAL MEDICINE & REHABILITATION

## 2020-01-15 RX ADMIN — ATORVASTATIN CALCIUM 80 MG: 80 TABLET, FILM COATED ORAL at 08:53

## 2020-01-15 RX ADMIN — QUETIAPINE FUMARATE 50 MG: 25 TABLET ORAL at 20:27

## 2020-01-15 RX ADMIN — PANTOPRAZOLE SODIUM 40 MG: 40 TABLET, DELAYED RELEASE ORAL at 07:01

## 2020-01-15 RX ADMIN — CLOPIDOGREL 75 MG: 75 TABLET, FILM COATED ORAL at 08:53

## 2020-01-15 RX ADMIN — FLUOXETINE 20 MG: 20 CAPSULE ORAL at 08:53

## 2020-01-15 RX ADMIN — CARVEDILOL 3.12 MG: 3.12 TABLET, FILM COATED ORAL at 08:53

## 2020-01-15 RX ADMIN — SENNOSIDES AND DOCUSATE SODIUM 2 TABLET: 8.6; 5 TABLET ORAL at 20:27

## 2020-01-15 RX ADMIN — ENOXAPARIN SODIUM 40 MG: 40 INJECTION SUBCUTANEOUS at 08:53

## 2020-01-15 RX ADMIN — ASPIRIN 81 MG 81 MG: 81 TABLET ORAL at 08:53

## 2020-01-15 RX ADMIN — SENNOSIDES AND DOCUSATE SODIUM 2 TABLET: 8.6; 5 TABLET ORAL at 08:53

## 2020-01-15 ASSESSMENT — PAIN SCALES - GENERAL
PAINLEVEL_OUTOF10: 0
PAINLEVEL_OUTOF10: 0

## 2020-01-15 NOTE — PROGRESS NOTES
support 2 x 10. Seated stepper L3: 3 min  to maintain (R) LE foot placement      Comment: 1st session:  See above functional mobility and exercise. In addition patient completes (R) LE targeted stepping forward/diagonal/lateral x 5 ea requiring max (A) for balance stabilization. 2nd session: Pt was seated in w/c upon arrival, pt stated \"I'm leaving today\" multiple times. Cotx throughout the session for increased pt safety. Pt completed multiple bouts of sit <> stand to/from w/c with Mod A and frequent VCs for LE placement for transfer set up and to push with UEs from the w/c before standing. Pt ambulated 100' with RW and Mod A along with tactile cueing for weight shift to the R and assistance with RW management. Pt required frequent cues throughout ambulation to increase R foot clearance and for proper RW management. 3KG weight ball activity:  diagonal chops with a theraband while standing, 5 rotational ball passes ea. Attempted cone step overs without UE support, activity transitioned to toe taps secondary to significant balance instability during task. Alternating 6\" toe taps without UE support x 5 ea. Throughout therapeutic exercises, pt required tactile cues for trunk rotation, assistance with hand placement from OT and fluctuating assistance from Min A to Mod A due to balance instability. For the toe taps, pt required Mod A to Max A, due to LOB, along with PT assistance with R LE placement. Pt completed w/c to bed transfer with Max A due to improper LE placement during the transfer and impulsivity. Pt continues to require frequent cueing for sequencing of ambulation and transfers. Pt ended the session in bed with bed alarm on, call light within reach, bed in the lowest position and fall mats placed around the bed.       Goals  Short term goals  Time Frame for Short term goals: to be met in 1.5 wks   Short term goal 1: pt able to perform bed mobility with SBA - goal met 1/15/19  Short term goal 2: pt able to perform wc mobility 150ft mod I  Short term goal 3: pt able to perform transfers with min A of 1  Short term goal 4: pt able to ambulate 25 ft with LRAD and mod A - goal met 1/14/20  Short term goal 5: pt able to navigate 4 steps with handrailing and mod A - goal met 1/14/20  Long term goals  Time Frame for Long term goals : to be met in 3 wks   Long term goal 1: pt able to perform bed mobility mod I  Long term goal 2: pt able to perform transfers with supervision   Long term goal 3: pt able to ambulate 50+ ft with LRAD and supervision   Long term goal 4: pt able to navigate 4 steps with handrailing and supervision   Patient Goals   Patient goals : To go home    Plan    Plan  Times per week: 5 of 7 days per week for 60 minutes per day   Times per day: Daily  Current Treatment Recommendations: Strengthening, Functional Mobility Training, Transfer Training, Balance Training, Endurance Training, Stair training, Gait Training, Neuromuscular Re-education, Wheelchair Mobility Training, Safety Education & Training, Patient/Caregiver Education & Training, Manual Therapy - Joint Manipulation  Safety Devices  Type of devices: All fall risk precautions in place, Call light within reach, Chair alarm in place, Left in chair, Nurse notified, Amber Picking in use  Restraints  Initially in place: No     Therapy Time   Individual Concurrent Group Co-treatment   Time In 1030         Time Out 1100         Minutes 30         Timed Code Treatment Minutes: 30 Minutes    Second Session Therapy Time:   Individual Concurrent Group Co-treatment   Time In       1115   Time Out       1145   Minutes       30     Timed Code Treatment Minutes:    30+30  Total Treatment Minutes:    60      First session completed by:  Coral De PT, DPT - [id number]    Second session completed by:  Georges Wallace, [facility]  Therapist was present, directed the patient's care, made skilled judgement, and was responsible for assessment and treatment of the patient.

## 2020-01-15 NOTE — PROGRESS NOTES
Occupational Therapy  Facility/Department: [facility] ACUTE REHAB UNIT  Daily Treatment Note  NAME: Sang To  : 1960  MRN: [id number]    Date of Service: 1/15/2020    Discharge Recommendations:  Continue to assess pending progress, Patient would benefit from continued therapy after discharge  OT Equipment Recommendations  Equipment Needed: Yes  Other: Pt may require grab bars in shower, grab bars around toilet, shower chair    Assessment   Performance deficits / Impairments: Decreased functional mobility ; Decreased ROM; Decreased strength;Decreased sensation;Decreased balance;Decreased ADL status; Decreased safe awareness;Decreased coordination;Decreased cognition;Decreased vision/visual deficit  Assessment: 62 y.o. male presents w/ the above deficits which are impacing her occupational performance. Pt would benefit from continued therapy during inpatient stay in order to maximize safety and independence upon discharge   Treatment Diagnosis: Multiple performance deficits s/p CVA  OT Education: Transfer Training;OT Role  Patient Education: vc to visually attend to [facility] UP: Yes  Activity Tolerance  Activity Tolerance: Patient Tolerated treatment well  Safety Devices  Safety Devices in place: Yes  Type of devices: Left in chair;Call light within reach; Chair alarm in place; All fall risk precautions in place         Patient Diagnosis(es): There were no encounter diagnoses. has a past medical history of CAD (coronary artery disease), Chronic kidney disease, Collapsed lung, GERD (gastroesophageal reflux disease), Hematuria, Hyperlipidemia, Hypertension, ICD (implantable cardioverter-defibrillator) in place, Mural thrombus of left ventricle, and Tortuous aorta ([address] has a past surgical history that includes Colon surgery; Cholecystectomy, open; Arm Debridement (10-26-10); Cardiac surgery; Colonoscopy (2016); pacemaker placement (Left, );  Upper gastrointestinal endoscopy w/ RLE. Pt distracted by visual/auditory stimuli throughout. Pt completed toe taps at stairs w/o UE support x5 reps each foot, Mod A for weight shift, occasional Max A for balance. Pt required visual demonstration throughout for attention to task. Pt requesting to get into bed at end of session. SPT>EOB w/ Max A, pt w/ increased impulsivity w/ movement. Pt supine in bed at end of session, bed alarm on, bed in lowest position w/ mats on floor, needs within reach. Plan   Plan  Times per week: 60 min; 5-7x week  Times per day: Daily  Current Treatment Recommendations: Strengthening, Balance Training, ROM, Functional Mobility Training, Neuromuscular Re-education, Safety Education & Training, Self-Care / ADL, Patient/Caregiver Education & Training, Cognitive/Perceptual Training    Goals  Short term goals  Time Frame for Short term goals: 7-10 days   Short term goal 1: Bathing w/ Min A-Goal met 1/15/2020  Short term goal 2: UB dressing w/ Min A-Goal met 1/15/2020  Short term goal 3: LB dressing w/ Min A  Short term goal 4: Functional transfer for ADL completion w/ Min A-Goal met 1/15/2020  Short term goal 5: Toileting w/ Min A  Long term goals  Time Frame for Long term goals : 3 weeks   Long term goal 1: Bathing w/ Mod I  Long term goal 2: UB dressing w/ Mod I  Long term goal 3: LB dressing w/ Mod I  Long term goal 4: Functional transfer for ADL completion w/ Mod I  Long term goal 5: Toileting w/ Mod I  Patient Goals   Patient goals :  \"To go home\"       Therapy Time   Individual Concurrent Group Co-treatment   Time In 0845      1115   Time Out 0920      1145   Minutes 35      30        Timed Code Treatment Minutes:  35 + 30  Total Treatment Minutes:  65 minutes       Nitish Velásquez, OT   Nitish Velásquez R/L, [place] #[id number]

## 2020-01-15 NOTE — PROGRESS NOTES
Nutrition Assessment (Low Risk)    Type and Reason for Visit: Reassess    Nutrition Recommendations:   Please Reweigh pt     Nutrition Assessment:  Pt's nutrition status is stable as po intake % of meals. Wt discrepancy per EMR: 24 lb loss in 12 days- question reliability of wt as intake has provided adequate nutrition. Will request new wt & monitor for further nutrition concerns.      Malnutrition Assessment:  · Malnutrition Status: No malnutrition    Nutrition Risk Level   Risk Level: Low    Nutrition Diagnosis:   · Problem: No nutrition diagnosis at this time    Nutrition Intervention:  Food and/or Delivery: Continue current diet  Nutrition Education/Counseling/Coordination of Care:  Continued Inpatient Monitoring, Education Not Indicated      Electronically signed by Susie Crisostomo RD, LD on 1/15/20 at 1:58 PM    Contact Number: [phone]

## 2020-01-15 NOTE — PROGRESS NOTES
strategies/precautions. Goal not targeted this session. The patient will tolerate regular consistency solids 10/10. Provided pt with trial of regular texture bishop during breakfast meal. Pt again noted to be impulsive and take large size bites. Mastication was thoroughly with min increased time for bolus manipulation. Provided verbal cues to check for pocketing on R side. No overt s/s aspiration noted. Goal not targeted this session. Pt will improve speech intelligibility in connected speech via graded tasks to 90% accuracy, min cues. Goal not directly targeted; however, pt did needed verbal cues to slow rate and over-emphasize speech to be more intelligible in conversation. Goal not targeted this session. Pt will improve auditory processing/comprehension of commands and questions to 80% accuracy, via graded tasks, with min cues. Pt answered questions about dates/events during hospitalization using calendar in room with 50% accuracy independently (2/4), increasing to 75% accuracy given mod cues. Pt impulsive to answer questions--often would point to correct date but read the wrong date out loud. Goal not targeted this session. Pt will improve verbal expression/pragmatic skills for affect, eye contact, turn taking and appropriate content in conversation via graded tasks with 80% accuracy, min cues. Reduced eye contact and conversational exchange noted; pt providing short responses to conversational questions. Short responses provided during conversational exchange.    Pt will improve executive function for multifactor task completion via graded tasks to 80% accuracy, min cues   Daily schedule written out for pt     Deduction by Exclusion:  -determining a target date given exclusion clues  -pt demonstrated high impulsivity during task and poor planning/scanning of dates on calendar  -cued pt to read clues and start with first date on calendar and scan date by date/week by week--pt

## 2020-01-15 NOTE — PROGRESS NOTES
Thin  Diet Solids Recommendation: Dysphagia Soft and Bite-Sized (Dysphagia III)  Liquid Consistency Recommendation: Thin    Body mass index is 24.71 kg/m². Assessment:  Patient Active Problem List   Diagnosis    Cellulitis    Hyponatremia    Arm pain    Essential hypertension    Ischemic chest pain (HCC)    Mixed hyperlipidemia    ARF (acute renal failure) (HCC)    Automatic implantable cardioverter-defibrillator in situ    Tobacco abuse    [facility]    CAD (coronary artery disease)    Ischemic chest pain ([facility]    Unstable angina ([facility]    Chest pain    Cocaine abuse ([facility]    Ischemic cardiomyopathy    Hematochezia    Acute cerebrovascular accident (CVA) ([facility]    Oropharyngeal dysphagia    Organic mood disorder    Current episode of major depressive disorder without prior episode    Acute ischemic stroke ([facility]       Plan:   Acute left thalamic and temporal lobe infarcts: PT/OT for hemiplegia  Large right frontal lobe encephalomalacia: question stimulant abuse as coping mechanism for focus and attention. Trialing low-dose ritalin - d/c. Prozac for motor recovery     Hypotension: s/p IVF, adjusted coreg     Agitation: environmental precautions. Ativan PRN. Scheduled seroquel HS. Improving     Dysphagia: SLP     HLD: lipitor 80     HTN: coreg decreased     CAD: ASA, statin     Cardiomyopathy: EF 35%, daily weights, coreg     Amphetamine abuse: education     Tobacco abuse: nicoderm 14      Bowels: Per protocol  Bladder: Per protocol   Sleep: Seroquel  Pain: tylenol PRN  DVT PPx: Lovenox     Lupe Galeazzi, MD 1/15/2020, 8:45 AM    * This document was created using dictation software. While all precautions were taken to ensure accuracy, errors may have occurred. Please disregard any typographical errors.

## 2020-01-16 LAB
ESTIMATED AVERAGE GLUCOSE: 114 MG/DL
HBA1C MFR BLD: 5.6 %

## 2020-01-16 PROCEDURE — 97129 THER IVNTJ 1ST 15 MIN: CPT

## 2020-01-16 PROCEDURE — 1280000000 HC REHAB R&B

## 2020-01-16 PROCEDURE — 6360000002 HC RX W HCPCS: Performed by: PHYSICAL MEDICINE & REHABILITATION

## 2020-01-16 PROCEDURE — 6370000000 HC RX 637 (ALT 250 FOR IP): Performed by: PHYSICAL MEDICINE & REHABILITATION

## 2020-01-16 PROCEDURE — 97116 GAIT TRAINING THERAPY: CPT

## 2020-01-16 PROCEDURE — 97130 THER IVNTJ EA ADDL 15 MIN: CPT

## 2020-01-16 PROCEDURE — 97112 NEUROMUSCULAR REEDUCATION: CPT

## 2020-01-16 PROCEDURE — 97530 THERAPEUTIC ACTIVITIES: CPT

## 2020-01-16 RX ADMIN — QUETIAPINE FUMARATE 50 MG: 25 TABLET ORAL at 21:54

## 2020-01-16 RX ADMIN — FLUOXETINE 20 MG: 20 CAPSULE ORAL at 08:03

## 2020-01-16 RX ADMIN — CARVEDILOL 3.12 MG: 3.12 TABLET, FILM COATED ORAL at 08:03

## 2020-01-16 RX ADMIN — ENOXAPARIN SODIUM 40 MG: 40 INJECTION SUBCUTANEOUS at 08:03

## 2020-01-16 RX ADMIN — CARVEDILOL 3.12 MG: 3.12 TABLET, FILM COATED ORAL at 16:32

## 2020-01-16 RX ADMIN — PANTOPRAZOLE SODIUM 40 MG: 40 TABLET, DELAYED RELEASE ORAL at 06:07

## 2020-01-16 RX ADMIN — LORAZEPAM 0.5 MG: 0.5 TABLET ORAL at 16:32

## 2020-01-16 RX ADMIN — SENNOSIDES AND DOCUSATE SODIUM 2 TABLET: 8.6; 5 TABLET ORAL at 08:03

## 2020-01-16 RX ADMIN — CLOPIDOGREL 75 MG: 75 TABLET, FILM COATED ORAL at 08:03

## 2020-01-16 RX ADMIN — SENNOSIDES AND DOCUSATE SODIUM 2 TABLET: 8.6; 5 TABLET ORAL at 21:54

## 2020-01-16 RX ADMIN — ATORVASTATIN CALCIUM 80 MG: 80 TABLET, FILM COATED ORAL at 08:03

## 2020-01-16 RX ADMIN — ASPIRIN 81 MG 81 MG: 81 TABLET ORAL at 08:03

## 2020-01-16 ASSESSMENT — PAIN SCALES - GENERAL: PAINLEVEL_OUTOF10: 0

## 2020-01-16 NOTE — PROGRESS NOTES
Patient noted at bedside requesting to use urinal.  Patient assisted to standing at bedside and before the RN could place urinal he pulled his penis out of pants and starting urinating onto bedside mat while cursing stating \"Oh Fuck\" I couldn't hold it. This RN placed urinal to catch continued flow. After patient completed with urination and assisted back into bed, urine on mat cleaned up and mat disinfected with bleach wipes.

## 2020-01-16 NOTE — PROGRESS NOTES
Pt has eaten about 25% of his meal and is now fast asleep. Bed in low position and call light at pts side.

## 2020-01-16 NOTE — PROGRESS NOTES
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 Denied need for pain intervention. Subjective     Pt sitting in chair with eyes closed. Pt able to awaken with verbal cues. Pt cooperative and participated with all tasks during session. Pt sleeping in chair. Able to be awakened and sit upright for session. Objective:  Goals     The patient will tolerate recommended diet without observed clinical signs of aspiration. Goal not targeted this session.   - Pt perseveratively chewing during session x1- no bolus seen in oral cavity and pt denied having anything in his mouth. Goal not targeted this session.   - Pt perseveratively chewing during session x1- no bolus seen in oral cavity and pt denied having anything in his mouth. The patient will recall and perform compensatory strategies, with no cues. Goal not targeted this session. Goal not targeted this session. The patient will tolerate regular consistency solids 10/10. Goal not targeted this session. Goal not targeted this session. Pt will improve speech intelligibility in connected speech via graded tasks to 90% accuracy, min cues. GOAL MET GOAL MET   Pt will improve auditory processing/comprehension of commands and questions to 80% accuracy, via graded tasks, with min cues.    - 3-step body movement- 8/10 (80%) on initial presentation, improved to 90% with repetition min cues (slightly impulsive with pt completing movement prior to entire direction being read) with cue pt able to wait until entire direction was read   GOAL MET    Pt will improve attention to detail for improved recall and retention of newly learned information with 80% accuracy or min cues. - Unable to recall SLP's occupation despite f/3   - Four component (abstract) direction- 13/16 (81%)   - Unable to recall where his reading glasses was  - Poor attention to R arm with pt requiring ongoing cues to position it so it wasn't falling over the armrest of the chair   - Fx Recall of recent events- unable to recall having PT/OT prior to speech therapy, stated what he consumed for breakfast meal, Unable to recall MD making rounds this AM, recalled incident from previous night stating \"they didn't let me go\" able to recall step-dad being present    - Poor attention to R arm with pt requiring ongoing cues to position it so it wasn't falling over the armrest of the chair   - Poor attention to objects on R side (knocking off tissue box and phone onto floor)   - Reduced attention to details when reading (changing/ leaving out words)      Pt will improve verbal expression/pragmatic skills for affect, eye contact, turn taking and appropriate content in conversation via graded tasks with 80% accuracy, min cues. - Reduced eye contact with SLP during session  - Reduced eye contact with SLP during session   - Toward end of session when SLP said he had a few more items to complete pt stated, \"You're pushing it\" but participated in rest of session     Pt will improve executive function for multifactor task completion via graded tasks to 80% accuracy, min cues   - Pt reports trouble dialing cell phone since stroke when asked.  Use of cell phone- pt unable to recall how to unlock cell phone/ get into messages or locate contact list to make poor pragmatics (severely flat affect and limited eye contact), perseverativeness during conversation, reduced STM and reduced executive functioning for problem-solving tasks  Aphasia Diagnosis: Pt presents with moderate receptive aphasia deficits characterized by difficulty with complex commands/questions, suspect cognitive deficits contribute to difficulty  Speech Diagnosis: Mild dysathria noted in connected speech/conversation    Current Diet Order: DIET DYSPHAGIA SOFT AND BITE-SIZED; No Drinking Straw   Recommended Form of Meds: Allow Meds whole with water   Compensatory Swallowing Strategies: Alternate solids and liquids, Small bites/sips, Remain upright for 30-45 minutes after meals, Upright as possible for all oral intake     Plan:     Frequency:  5days/week   60 minutes/day  Discharge Recommendations:   Barriers: Cognitive deficit, Impulsivity, Limited safety awareness, Limited insight into deficits, Behaviors, Noncompliance with meds/ substance abuse, and Unrealistic expectations    Discharge Recommendations:    [] Home independently   [] Home with assistance   [x]  24 hour supervision     [] SNF   [x] Other: TBD    Continued SLP Treatment:    [x] Yes   [] No   [] TBD based on progress while on ARU   [] Vital Stim indicated   [] Other:     Estimated discharge date: 1/28/20     Type of Total Treatment Minutes   Session 1   Session 2   Time In 0900 1030   Time Out 0930  1100   Timed Code Minutes  23 30   Individual Treatment Minutes  30 30    Co-Treatment Minutes      Group Treatment Minutes      Concurrent Treatment Minutes        TOTAL DAILY MINUTES:  60    Electronically Signed by     Titi LEIGH CCC-SLP #[id number] 1/16/2020 7:39 AM  Speech-Language Pathologist

## 2020-01-16 NOTE — PROGRESS NOTES
Family (mom/dad) at bedside visiting with patient. Noted the onset of agitation with patient. PRN PO ativan administered w/o issue to assist in any further upset.

## 2020-01-16 NOTE — PROGRESS NOTES
Pt resting quietly with eyes closed, arouses easily - decided he wants to eat his dinner - dinner tray offered - pt very calm and cooperative at this time. Will continue to monitor.

## 2020-01-16 NOTE — PROGRESS NOTES
Physical Therapy  Facility/Department: [facility] ACUTE REHAB UNIT  Daily Treatment Note  NAME: Genaro Hendricks  : 1960  MRN: [id number]    Date of Service: 2020    Discharge Recommendations:  Home with Home health PT, 24 hour supervision or assist   PT Equipment Recommendations  Equipment Needed: Yes  Other: TBD with progress     Assessment   Body structures, Functions, Activity limitations: Decreased functional mobility ; Decreased strength;Decreased endurance;Decreased ADL status; Decreased safe awareness;Decreased sensation;Decreased cognition;Decreased balance;Decreased posture;Decreased coordination  Assessment: Pt continues to progress all aspects of functional mobility and ability to use (R) LE/UE. Pt continues to have inattention to (R) side and decreased insight to deficits. Pt would benefit from continued skilled PT services to regain baseline functional independence. Treatment Diagnosis: R inattention, weakness, impaired sensation secondary to CVA  Prognosis: Good  PT Education: Goals;PT Role;Plan of Care;General Safety;Transfer Training;Gait Training;Functional Mobility Training  Patient Education: Educated on need for staff assist with all mobility tasks. Barriers to Learning: cognitive, decreased insight to deficits  REQUIRES PT FOLLOW UP: Yes  Activity Tolerance  Activity Tolerance: Patient Tolerated treatment well;Patient limited by cognitive status  Activity Tolerance: Requires consistent safety cueing secondary to impulsive behavior during mobility tasks. Decreased insight to current deficits and attention to (R) side. Patient Diagnosis(es): CVA   has a past medical history of CAD (coronary artery disease), Chronic kidney disease, Collapsed lung, GERD (gastroesophageal reflux disease), Hematuria, Hyperlipidemia, Hypertension, ICD (implantable cardioverter-defibrillator) in place, Mural thrombus of left ventricle, and Tortuous aorta ([address]    has a past surgical history that includes Colon surgery; Cholecystectomy, open; Arm Debridement (10-26-10); Cardiac surgery; Colonoscopy (07/18/2016); pacemaker placement (Left, 2013); Upper gastrointestinal endoscopy (05/01/2017); Upper gastrointestinal endoscopy (06/27/2017); and Colonoscopy (11/20/2019). Restrictions  Restrictions/Precautions  Restrictions/Precautions: Fall Risk  Required Braces or Orthoses?: No  Position Activity Restriction  Other position/activity restrictions: 77-year-old male with a history of CAD, CK-MB, HTN, HLD, and status post ICD placement who was admitted on 1/3 with acute right-sided weakness. CTH revealed acute left thalamic and temporal lobe infarcts. CTA revealed an occlusion of the left PCA. echo with an EF of 35 percent and negative bubble study. . No A1c reported. Of note he did have a UDS which was positive for amphetamines. He was evaluated by neurology who considered anticoagulation but declined due to prior non-adherence. He was evaluated by therapy and suggested to continue an inpatient setting prior to returning home. Subjective   General  Chart Reviewed: Yes  Response To Previous Treatment: Patient with no complaints from previous session. Family / Caregiver Present: No  Subjective  Subjective: Pt denies pain. Agreeable to therapy session initially, periods of frustration within session secondary to need for ongoing rehab stay. General Comment  Comments: Pt supine in bed upon arrival.  Pt had period of reported agitation overnight. Pt observed to have brusing to (R) forearm with localized edema. Pain Screening  Patient Currently in Pain: Denies  Vital Signs  Patient Currently in Pain: Denies       Cognition   Cognition  Overall Cognitive Status: Exceptions  Arousal/Alertness: Appropriate responses to stimuli  Following Commands: Follows one step commands with increased time; Follows one step commands with repetition  Attention Span: Difficulty dividing attention; Difficulty attending to directions  Memory: Decreased recall of recent events  Safety Judgement: Decreased awareness of need for safety;Decreased awareness of need for assistance  Problem Solving: Decreased awareness of errors  Insights: Decreased awareness of deficits  Initiation: Requires cues for some  Sequencing: Requires cues for some  Cognition Comment: Pt able to recognize physical deficits but unable to relate those deficits to everyday tasks. Objective   Bed mobility  Rolling to Left: Supervision  Supine to Sit: Supervision  Scooting: Supervision  Comment: Bed flat with use of hand rails  Transfers  Sit to Stand: Minimal Assistance(ranges from CGA/min (A) with VC for hand placement and LE positioning)  Stand to sit: Minimal Assistance(ranges from CGA/min (A) with VC for hand placement and LE positioning)  Stand Pivot Transfers: Minimal Assistance(w/c => recliner with VC for foot placement, safety, sequence)  Comment: Impulsive with need for frequent VCs for set up before transfers and LE/hand placement throughout sequencing of transfers. Ambulation  Ambulation?: Yes  More Ambulation?: Yes  Ambulation 1  Surface: level tile  Device: Rolling Walker  Other Apparatus: Right((R) hand splint attachment)  Assistance: Minimal assistance  Quality of Gait: Reciprocal pattern with narrow KATE. Pt requires tactile curing for lateral weight shifting to the R. Pt displays a reduction in R foot clearance that he can correct after VCs. Distance: 13' + 20'  Comments: Requires assistance for walker stabilization/guidance. Stairs/Curb  Stairs?: No     Balance  Posture: Fair  Sitting - Static: Good  Sitting - Dynamic: Fair;+  Standing - Static: Fair;-  Standing - Dynamic: Poor;+     Comment: 1st session:  See above functional mobility and exercise.   Session begins with ambulation bed => sink to brush teeth per pt request.  Pt maintain standing position at sink at [facility] with facilitation for (R) UE WB during activity in addition to ADL assist

## 2020-01-16 NOTE — PROGRESS NOTES
TBD with progress      Assessment        SLP  Current Diet : Dysphagia Soft and Bite-Sized (Dysphagia III)  Current Liquid Diet : Thin  Diet Solids Recommendation: Dysphagia Soft and Bite-Sized (Dysphagia III)  Liquid Consistency Recommendation: Thin    Body mass index is 24.71 kg/m². Assessment:  Patient Active Problem List   Diagnosis    Cellulitis    Hyponatremia    Arm pain    Essential hypertension    Ischemic chest pain (HCC)    Mixed hyperlipidemia    ARF (acute renal failure) (HCC)    Automatic implantable cardioverter-defibrillator in situ    Tobacco abuse    [facility]    CAD (coronary artery disease)    Ischemic chest pain ([address]    Unstable angina ([address]    Chest pain    Cocaine abuse ([facility]    Ischemic cardiomyopathy    Hematochezia    Acute cerebrovascular accident (CVA) ([facility]    Oropharyngeal dysphagia    Organic mood disorder    Current episode of major depressive disorder without prior episode    Acute ischemic stroke ([facility]       Plan:   Acute left thalamic and temporal lobe infarcts: PT/OT for hemiplegia  Large right frontal lobe encephalomalacia: question stimulant abuse as coping mechanism for focus and attention. Trialing low-dose ritalin without improvement. Prozac for motor recovery     Hypotension: s/p IVF, adjusted coreg     Agitation: environmental precautions. Ativan PRN. Scheduled seroquel HS.      Dysphagia: SLP     HLD: lipitor 80     HTN: coreg decreased     CAD: ASA, statin     Cardiomyopathy: EF 35%, daily weights, coreg     Amphetamine abuse: education     Tobacco abuse: nicoderm 14      Bowels: Per protocol  Bladder: Per protocol   Sleep: Seroquel  Pain: tylenol PRN  DVT PPx: Lovenox     Yo Quevdeo MD 1/16/2020, 8:32 AM    * This document was created using dictation software. While all precautions were taken to ensure accuracy, errors may have occurred. Please disregard any typographical errors.

## 2020-01-16 NOTE — PLAN OF CARE
Problem: Pain:  Description  Pain management should include both nonpharmacologic and pharmacologic interventions.   Goal: Control of acute pain  Description  Control of acute pain  Outcome: Ongoing  Goal: Control of chronic pain  Description  Control of chronic pain  Outcome: Ongoing

## 2020-01-16 NOTE — PROGRESS NOTES
structure, pt was tasked to gather correct parts and assemble structure according to the visual model. Pt was able to successfully build an easy level model, verbal cues to use RUE to best of ability (decreased motor coordination, proprioception, and fine motor control in RUE noted). When given a moderately hard model pt had significant difficulty following the R side of the visual model. Pt was unable to recognize errors independently, required moderate verbal cues to correct errors when made. Pt was distracted by visual stimuli throughout session, required cues to maintain attention to task. When frustrated w/ task pt began perseverating on going home. Pt transported back to room. Requesting to get into bed. Pt would not sit in a chair after education about OOB activity. SPT>EOB=Min A. Pt supine in bed at end of session, bed alarm on, mats on floor, needs within reach. Plan   Plan  Times per week: 60 min; 5-7x week  Times per day: Daily  Current Treatment Recommendations: Strengthening, Balance Training, ROM, Functional Mobility Training, Neuromuscular Re-education, Safety Education & Training, Self-Care / ADL, Patient/Caregiver Education & Training, Cognitive/Perceptual Training    Goals  Short term goals  Time Frame for Short term goals: 7-10 days   Short term goal 1: Bathing w/ Min A-Goal met 1/15/2020  Short term goal 2: UB dressing w/ Min A-Goal met 1/15/2020  Short term goal 3: LB dressing w/ Min A  Short term goal 4: Functional transfer for ADL completion w/ Min A-Goal met 1/15/2020  Short term goal 5: Toileting w/ Min A  Long term goals  Time Frame for Long term goals : 3 weeks   Long term goal 1: Bathing w/ Mod I  Long term goal 2: UB dressing w/ Mod I  Long term goal 3: LB dressing w/ Mod I  Long term goal 4: Functional transfer for ADL completion w/ Mod I  Long term goal 5: Toileting w/ Mod I  Patient Goals   Patient goals :  \"To go home\"       Therapy Time   Individual Concurrent Group

## 2020-01-17 LAB
ANION GAP SERPL CALCULATED.3IONS-SCNC: 12 MMOL/L (ref 3–16)
BUN BLDV-MCNC: 17 MG/DL (ref 7–20)
CALCIUM SERPL-MCNC: 9.1 MG/DL (ref 8.3–10.6)
CHLORIDE BLD-SCNC: 104 MMOL/L (ref 99–110)
CO2: 24 MMOL/L (ref 21–32)
CREAT SERPL-MCNC: 0.9 MG/DL (ref 0.8–1.3)
GFR AFRICAN AMERICAN: >60
GFR NON-AFRICAN AMERICAN: >60
GLUCOSE BLD-MCNC: 102 MG/DL (ref 70–99)
HCT VFR BLD CALC: 46.4 % (ref 40.5–52.5)
HEMOGLOBIN: 15.3 G/DL (ref 13.5–17.5)
MCH RBC QN AUTO: 30.7 PG (ref 26–34)
MCHC RBC AUTO-ENTMCNC: 33.1 G/DL (ref 31–36)
MCV RBC AUTO: 92.8 FL (ref 80–100)
PDW BLD-RTO: 13.2 % (ref 12.4–15.4)
PLATELET # BLD: 208 K/UL (ref 135–450)
PMV BLD AUTO: 9.5 FL (ref 5–10.5)
POTASSIUM SERPL-SCNC: 4 MMOL/L (ref 3.5–5.1)
RBC # BLD: 5 M/UL (ref 4.2–5.9)
SODIUM BLD-SCNC: 140 MMOL/L (ref 136–145)
WBC # BLD: 7.3 K/UL (ref 4–11)

## 2020-01-17 PROCEDURE — 97535 SELF CARE MNGMENT TRAINING: CPT

## 2020-01-17 PROCEDURE — 6360000002 HC RX W HCPCS: Performed by: PHYSICAL MEDICINE & REHABILITATION

## 2020-01-17 PROCEDURE — 97530 THERAPEUTIC ACTIVITIES: CPT

## 2020-01-17 PROCEDURE — 1280000000 HC REHAB R&B

## 2020-01-17 PROCEDURE — 6370000000 HC RX 637 (ALT 250 FOR IP): Performed by: PHYSICAL MEDICINE & REHABILITATION

## 2020-01-17 PROCEDURE — 97116 GAIT TRAINING THERAPY: CPT

## 2020-01-17 PROCEDURE — 97129 THER IVNTJ 1ST 15 MIN: CPT

## 2020-01-17 PROCEDURE — 97112 NEUROMUSCULAR REEDUCATION: CPT

## 2020-01-17 PROCEDURE — 97130 THER IVNTJ EA ADDL 15 MIN: CPT

## 2020-01-17 PROCEDURE — 85027 COMPLETE CBC AUTOMATED: CPT

## 2020-01-17 PROCEDURE — 80048 BASIC METABOLIC PNL TOTAL CA: CPT

## 2020-01-17 PROCEDURE — 36415 COLL VENOUS BLD VENIPUNCTURE: CPT

## 2020-01-17 RX ADMIN — CLOPIDOGREL 75 MG: 75 TABLET, FILM COATED ORAL at 08:55

## 2020-01-17 RX ADMIN — ENOXAPARIN SODIUM 40 MG: 40 INJECTION SUBCUTANEOUS at 08:55

## 2020-01-17 RX ADMIN — CARVEDILOL 3.12 MG: 3.12 TABLET, FILM COATED ORAL at 08:56

## 2020-01-17 RX ADMIN — POLYETHYLENE GLYCOL 3350 17 G: 17 POWDER, FOR SOLUTION ORAL at 08:55

## 2020-01-17 RX ADMIN — ATORVASTATIN CALCIUM 80 MG: 80 TABLET, FILM COATED ORAL at 08:56

## 2020-01-17 RX ADMIN — FLUOXETINE 20 MG: 20 CAPSULE ORAL at 08:55

## 2020-01-17 RX ADMIN — CARVEDILOL 3.12 MG: 3.12 TABLET, FILM COATED ORAL at 17:30

## 2020-01-17 RX ADMIN — SENNOSIDES AND DOCUSATE SODIUM 2 TABLET: 8.6; 5 TABLET ORAL at 08:56

## 2020-01-17 RX ADMIN — PANTOPRAZOLE SODIUM 40 MG: 40 TABLET, DELAYED RELEASE ORAL at 06:19

## 2020-01-17 RX ADMIN — ASPIRIN 81 MG 81 MG: 81 TABLET ORAL at 08:56

## 2020-01-17 RX ADMIN — QUETIAPINE FUMARATE 50 MG: 25 TABLET ORAL at 21:42

## 2020-01-17 RX ADMIN — SENNOSIDES AND DOCUSATE SODIUM 2 TABLET: 8.6; 5 TABLET ORAL at 21:42

## 2020-01-17 ASSESSMENT — PAIN SCALES - GENERAL
PAINLEVEL_OUTOF10: 0
PAINLEVEL_OUTOF10: 1
PAINLEVEL_OUTOF10: 0

## 2020-01-17 ASSESSMENT — PAIN DESCRIPTION - PROGRESSION: CLINICAL_PROGRESSION: GRADUALLY IMPROVING

## 2020-01-17 ASSESSMENT — PAIN DESCRIPTION - ORIENTATION: ORIENTATION: RIGHT

## 2020-01-17 ASSESSMENT — PAIN DESCRIPTION - ONSET: ONSET: GRADUAL

## 2020-01-17 ASSESSMENT — PAIN DESCRIPTION - FREQUENCY: FREQUENCY: INTERMITTENT

## 2020-01-17 ASSESSMENT — PAIN DESCRIPTION - LOCATION: LOCATION: LEG

## 2020-01-17 ASSESSMENT — PAIN DESCRIPTION - DESCRIPTORS: DESCRIPTORS: ACHING

## 2020-01-17 ASSESSMENT — PAIN - FUNCTIONAL ASSESSMENT: PAIN_FUNCTIONAL_ASSESSMENT: ACTIVITIES ARE NOT PREVENTED

## 2020-01-17 NOTE — PLAN OF CARE
Problem: SAFETY  Goal: LTG - Patient will demonstrate safety requirements appropriate to situation/environment  1/16/2020 2150 by Matt Copeland RN  Outcome: Ongoing  1/16/2020 1034 by Bridger Pond RN  Outcome: Ongoing     Problem: SAFETY  Goal: STG - Patient uses call light consistently to request assistance with transfers  1/16/2020 2150 by Matt Copeland RN  Outcome: Ongoing  1/16/2020 1034 by Bridger Pond RN  Outcome: Ongoing

## 2020-01-17 NOTE — PROGRESS NOTES
Physical Therapy  Facility/Department: [facility] ACUTE REHAB UNIT  Daily Treatment Note  NAME: Mechelle Welch  : 1960  MRN: [id number]    Date of Service: 2020    Discharge Recommendations:  Home with Home health PT, 24 hour supervision or assist   PT Equipment Recommendations  Equipment Needed: Yes  Other: TBD with progress     Assessment   Body structures, Functions, Activity limitations: Decreased functional mobility ; Decreased strength;Decreased endurance;Decreased ADL status; Decreased safe awareness;Decreased sensation;Decreased cognition;Decreased balance;Decreased posture;Decreased coordination  Assessment: Pt is making good progress including ability to ambulate further this session and to complete transfers with a reduced amount of assistance. Pt completed a car transfer this session with Min A. Pt continues to display inattention of R side and decreased insight to deficits and safety. Pt would continue to benefit from skilled PT to improve functional independence, safety and R sided function. Treatment Diagnosis: R inattention, weakness, impaired sensation secondary to CVA  Prognosis: Good  Decision Making: Medium Complexity  PT Education: Goals;PT Role;Plan of Care;General Safety;Transfer Training;Gait Training;Functional Mobility Training  Patient Education: Educated on proper sequencing for transfers, including car and stand pivot transfers. REQUIRES PT FOLLOW UP: Yes  Activity Tolerance  Activity Tolerance: Patient Tolerated treatment well;Patient limited by endurance; Patient limited by cognitive status  Activity Tolerance: Pt requires frequent cueing due to impulsivity throughout functional mobility.       Patient Diagnosis(es): CVA   has a past medical history of CAD (coronary artery disease), Chronic kidney disease, Collapsed lung, GERD (gastroesophageal reflux disease), Hematuria, Hyperlipidemia, Hypertension, ICD (implantable cardioverter-defibrillator) in place, Mural thrombus of left transfer with CGA and VCs for UE/LEs management. Pt ambulated 100' with RW and Min A including facilitation of weight shift to R side and assistance with RW management. Pt required VCs throughout ambulation to increase R foot clearance and abduct before placing his R foot. PT transitions from sitting to prone on therapy table at [id number] with VC for (R) UE positioning. Pt performed 2x30s of prone R quadriceps stretching for pt. Pt completed 2x10 reps of glute bridges with facilitation of R LE from PT. Pt achieved quadruped position on therapy mat table over a swiss ball with min  assistance from PT for set up. In quadruped, pt performed 10 B weight shifts on UEs, 10 B forward reaches with UEs while weight bearing through opposite UE, 10 B LE kickbacks with VCs to lift LEs off of the mat. Pt transitioned to tall kneeling with UE support on swiss ball and CGA of 2. In tall kneeling, pt performed 10 reps B of superior/lateral reaching, 10 L/R upper body rotations while holding swiss ball and 10 swiss ball lifts overhead. Pt stated he felt fatigued at the end of the session and requested to return to bed. Pt ended the session in the bed on the lowest setting with bed alarm on, call light within reach, telesitter on and fall mats in place.    Goals  Short term goals  Time Frame for Short term goals: to be met in 1.5 wks   Short term goal 1: pt able to perform bed mobility with SBA - goal met 1/15/19  Short term goal 2: pt able to perform wc mobility 150ft mod I  Short term goal 3: pt able to perform transfers with min A of 1 - goal met 1/16/20  Short term goal 4: pt able to ambulate 25 ft with LRAD and mod A - goal met 1/14/20  Short term goal 5: pt able to navigate 4 steps with handrailing and mod A - goal met 1/14/20  Long term goals  Time Frame for Long term goals : to be met in 3 wks   Long term goal 1: pt able to perform bed mobility mod I  Long term goal 2: pt able to perform transfers with supervision   Long term goal 3: pt able to ambulate 50+ ft with LRAD and supervision   Long term goal 4: pt able to navigate 4 steps with handrailing and supervision   Patient Goals   Patient goals : To go home    Plan    Plan  Times per week: 5 of 7 days per week for 60 minutes per day   Times per day: Daily  Current Treatment Recommendations: Strengthening, Functional Mobility Training, Transfer Training, Balance Training, Endurance Training, Stair training, Gait Training, Neuromuscular Re-education, Wheelchair Mobility Training, Safety Education & Training, Patient/Caregiver Education & Training, Manual Therapy - Joint Manipulation, ADL/Self-care Training  Safety Devices  Type of devices:  All fall risk precautions in place, Left in chair, Call light within reach, Chair alarm in place, Gait belt, Telesitter in use(fall mats in place)  Restraints  Initially in place: No     Therapy Time   Individual Concurrent Group Co-treatment   Time In 0901         Time Out 0932         Minutes 31         Timed Code Treatment Minutes: 32 Minutes    Second Session Therapy Time:   Individual Concurrent Group Co-treatment   Time In 1331         Time Out 1402         Minutes 31           Timed Code Treatment Minutes:    31+31  Total Treatment Minutes:    Roger Bustillo 77, SPT  Therapist was present, directed the patient's care, made skilled judgement, and was responsible for assessment and treatment of the patient.  Co-signed and supervised by: Eleanor Moralse PT, DPT - [id number]

## 2020-01-17 NOTE — PROGRESS NOTES
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 Denied need for pain intervention stating, \"I'm trying not to use any pain pills. \" Pt denied pain. Subjective     Pt found in bed sleeping. Able to be awakened and positioned upright in in chair by RN for session. Pt cooperative and agreeable to speech therapy but fixated on \"getting out of here today\" and \"causing trouble\". Pt found lying in bed resting, willing to have HOB upright for session. Pt cooperative. Objective:  Goals     The patient will tolerate recommended diet without observed clinical signs of aspiration.    - Pt tolerated recommended diet of soft solids (Romansh toast) with no overt s/s of aspiration. Mildly prolonged but effective mastication and adequate oral clearance with extended time and use of liquid rinse. Goal not targeted this session. The patient will recall and perform compensatory strategies, with no cues. - Not responsive to cues of use of finger sweep to remove stasis from R sulcus following regular texture trial   Goal not targeted this session. The patient will tolerate regular consistency solids 10/10.    Regular texture (bishop)  - Tolerated 10/10 trials with no overt s/s of aspiration   - prolonged mastication  - Reduced bolus cohesion  - Reduced R labial sensation with bishop concise responses    Pt will improve executive function for multifactor task completion via graded tasks to 80% accuracy, min cues   - Pt kept saying he was going home today (able to admit he needs help with moving but unable to problem solve through how he would go home)   - Pt agreeing he is unstable on his feet but said his mom and dad can help them because they're strong  - Continued to state, \"I need to find a way out of here,\" During session   - Continues to state he needs to leave this weekend (unable to problem solve through how he is going to go about it)      Pt will participate in ongoing cognitive-linguitic testing with additional goals to be established as necessary. GOAL MET GOAL MET   Other areas targeted: Orientation:  - Place ([facility]) I  - Facility name min cue   - [address] president I     - Recalled mom and dad visiting previous date   - Unaware of upcoming d/c date    Orientation:  - Place ([facility]) [address] [place] [address] name min phonemic cue   - [address]  - Date   - Year self corrected with min cues (stated 2002 at first)     - Recalled therapist's occupation with f/3, unable to recall name with f/2  - With delay pt continued to require phonemic cue for location, able to recall therapist's name with f/2, unable to recall SLP's occupation with f/3      Education:   Reason/ rationale for therapy, need for ARU at this time and overall POC. No demonstration/ evidence of learning. Reason/ rationale for therapy, need for ARU at this time and overall POC. No demonstration/ evidence of learning. Safety Devices: [x] Call light within reach  [x] Chair alarm activated  [] Bed alarm activated  [x] Other: camera in place for safety [x] Call light within reach  [] Chair alarm activated  [x] Bed alarm activated  [x] Other: camera in place for safety.       Assessment:   Speech Therapy Diagnosis  Cognitive Diagnosis: Pt presents with moderate cognitive deficits characterized by poor pragmatics (severely flat affect and limited eye contact), perseverativeness during conversation, reduced STM and reduced executive functioning for problem-solving tasks  Aphasia Diagnosis: Pt presents with moderate receptive aphasia deficits characterized by difficulty with complex commands/questions, suspect cognitive deficits contribute to difficulty  Speech Diagnosis: Mild dysathria noted in connected speech/conversation    Current Diet Order: DIET DYSPHAGIA SOFT AND BITE-SIZED; No Drinking Straw   Recommended Form of Meds: Allow Meds whole with water   Compensatory Swallowing Strategies: Alternate solids and liquids, Small bites/sips, Remain upright for 30-45 minutes after meals, Upright as possible for all oral intake     Plan:     Frequency:  5days/week   60 minutes/day  Discharge Recommendations:   Barriers: Cognitive deficit, Impulsivity, Limited safety awareness, Limited insight into deficits, Behaviors, Noncompliance with meds/ substance abuse, and Unrealistic expectations    Discharge Recommendations:    [] Home independently   [] Home with assistance   [x]  24 hour supervision     [] SNF   [x] Other: TBD    Continued SLP Treatment:    [x] Yes   [] No   [] TBD based on progress while on ARU   [] Vital Stim indicated   [] Other:     Estimated discharge date: 1/28/20     Type of Total Treatment Minutes   Session 1   Session 2   Time In 0800 1115   Time Out 0830  1145   Timed Code Minutes  30 30   Individual Treatment Minutes  30 30   Co-Treatment Minutes      Group Treatment Minutes      Concurrent Treatment Minutes        TOTAL DAILY MINUTES:  60    Electronically Signed by     Reinaldo LEIGH CCC-SLP #[id number] 1/17/2020 7:40 AM  Speech-Language Pathologist

## 2020-01-17 NOTE — PROGRESS NOTES
Carmen Mckeon  1/17/2020  [id number]    Chief Complaint: Acute ischemic stroke ([facility] [address]    Subjective:   No overnight events. No current complaints. Participating well in therapy. ROS: No CP, SOB, dyspnea    Objective:  Patient Vitals for the past 24 hrs:   BP Temp Temp src Pulse Resp SpO2   01/16/20 2150 111/68 98.4 °F (36.9 °C) Oral 59 18 97 %   01/16/20 1245 117/65 -- -- -- -- 97 %   01/16/20 0800 121/68 98.1 °F (36.7 °C) Oral 77 16 97 %     Gen: No distress, pleasant. Resting in bed  HEENT: Normocephalic, atraumatic  CV: Regular rate and rhythm. No MRG   Resp: No respiratory distress. CTAB   Abd: Soft, nontender nondistended  Ext: No edema   Neuro: Alert, oriented, appropriately interactive. Right sided neglect but improving, RUE 4/5, RLE 2/5, 2/4 MAS    Laboratory data: Available via EMR. Therapy progress:  PT  Position Activity Restriction  Other position/activity restrictions: 78-year-old male with a history of CAD, CK-MB, HTN, HLD, and status post ICD placement who was admitted on 1/3 with acute right-sided weakness. CTH revealed acute left thalamic and temporal lobe infarcts. CTA revealed an occlusion of the left PCA. echo with an EF of 35 percent and negative bubble study. . No A1c reported. Of note he did have a UDS which was positive for amphetamines. He was evaluated by neurology who considered anticoagulation but declined due to prior non-adherence. He was evaluated by therapy and suggested to continue an inpatient setting prior to returning home.   Objective     Sit to Stand: Minimal Assistance(ranges from CGA/min (A) with VC for hand placement and LE positioning)  Stand to sit: Minimal Assistance(ranges from CGA/min (A) with VC for hand placement and LE positioning)  Bed to Chair: Maximum assistance(VCs to pt to position feet before completing transfer)  Device: Rolling Walker  Other Apparatus: Right((R) hand splint attachment)  Assistance: Minimal assistance  Distance: 13' + 20'  OT  PT Equipment Recommendations  Equipment Needed: Yes  Other: TBD with progress      Assessment        SLP  Current Diet : Dysphagia Soft and Bite-Sized (Dysphagia III)  Current Liquid Diet : Thin  Diet Solids Recommendation: Dysphagia Soft and Bite-Sized (Dysphagia III)  Liquid Consistency Recommendation: Thin    Body mass index is 24.71 kg/m². Assessment:  Patient Active Problem List   Diagnosis    Cellulitis    Hyponatremia    Arm pain    Essential hypertension    Ischemic chest pain (HCC)    Mixed hyperlipidemia    ARF (acute renal failure) (HCC)    Automatic implantable cardioverter-defibrillator in situ    Tobacco abuse    Primary cardiomyopathy ([address]    CAD (coronary artery disease)    Ischemic chest pain ([address]    Unstable angina ([address]    Chest pain    Cocaine abuse ([address]    Ischemic cardiomyopathy    Hematochezia    Acute cerebrovascular accident (CVA) ([address]    Oropharyngeal dysphagia    Organic mood disorder    Current episode of major depressive disorder without prior episode    Acute ischemic stroke ([facility]       Plan:   Acute left thalamic and temporal lobe infarcts: PT/OT for hemiplegia  Large right frontal lobe encephalomalacia: question stimulant abuse as coping mechanism for focus and attention. Trialing low-dose ritalin without improvement. Prozac for motor recovery     Hypotension: s/p IVF, adjusted coreg     Agitation: environmental precautions. Ativan PRN. Scheduled seroquel HS.      Dysphagia: SLP     HLD: lipitor 80     HTN: coreg decreased     CAD: ASA, statin     Cardiomyopathy: EF 35%, daily weights, coreg     Amphetamine abuse: education     Tobacco abuse: nicoderm 14      Bowels: Per protocol  Bladder: Per protocol   Sleep: Seroquel  Pain: tylenol PRN  DVT PPx: Lovenox     Leon Humphrey MD 1/17/2020, 7:57 AM    * This document was created using dictation software. While all precautions were taken to ensure accuracy, errors may have occurred.   Please

## 2020-01-17 NOTE — PROGRESS NOTES
Occupational Therapy  Facility/Department: [facility] ACUTE REHAB UNIT  Daily Treatment Note  NAME: Angelica Mathis  : 1960  MRN: [id number]    Date of Service: 2020    Discharge Recommendations:  Continue to assess pending progress, Patient would benefit from continued therapy after discharge  OT Equipment Recommendations  Equipment Needed: Yes  Other: Pt may require grab bars in shower, grab bars around toilet, shower chair    Assessment   Performance deficits / Impairments: Decreased functional mobility ; Decreased ROM; Decreased strength;Decreased sensation;Decreased balance;Decreased ADL status; Decreased safe awareness;Decreased coordination;Decreased cognition;Decreased vision/visual deficit  Assessment: Pt continues to require hands on assist w/ functional mobility, transfers, and ADL completion. Pt is limited d/t behavior, R visual inattention, cognition. Pt would benefit from continued therapy during inpatient stay in order to maximize safety and independence at discharge  Treatment Diagnosis: Multiple performance deficits s/p CVA  OT Education: Transfer Training;OT Role  Patient Education: vc to visually attend to [facility] UP: Yes  Activity Tolerance  Activity Tolerance: Patient Tolerated treatment well  Activity Tolerance: Pt w/ task avoidance behavior this session, frequently stating \"I'm done with this shit\". Pt able to be redirected. Safety Devices  Safety Devices in place: Yes  Type of devices: Left in chair;Call light within reach; Chair alarm in place; All fall risk precautions in place         Patient Diagnosis(es): There were no encounter diagnoses. has a past medical history of CAD (coronary artery disease), Chronic kidney disease, Collapsed lung, GERD (gastroesophageal reflux disease), Hematuria, Hyperlipidemia, Hypertension, ICD (implantable cardioverter-defibrillator) in place, Mural thrombus of left ventricle, and Tortuous aorta ([address]    has a past surgical history RLE, assist to adjust pants in stance )  Additional Comments: Pt perseverating on looking for a lighter in room. Pt looked through bedside table, ambulated to closet and looked through drawers. Completed w/ Min A for balance. Pt unable to locate lighter. Pt declines toileting. Pt ambulated to shower chair. Min A to complete transfer d/t pt's impulsivity to sit. Pt completed UB/LB bathing and dressing alternating between sitting/standing. Pt required Mod vebral cues to attend to R side, cues for safety. Pt completed grooming tasks while seated in chair. Balance  Sitting Balance: Stand by assistance  Standing Balance: Contact guard assistance  Standing Balance  Time: ~15 minutes   Activity: Transfers, ADL completion, to/from bathroom, to/from dining room    Functional Mobility  Functional - Mobility Device: Rolling Walker  Activity: To/From therapy gym  Assist Level: Moderate assistance  Functional Mobility Comments: Pt very distracted in halls requiring increased assist. Pt w/ decreased foot clearance/step length w/ RLE. Shower Transfers  Shower - Transfer From: Rebekah Houston - Transfer Type: To and From  Shower - Transfer To: Other  Shower - Technique: Ambulating  Shower Transfers: Moderate assistance  Shower Transfers Comments: Verbal cues for technique/safety     Bed mobility  Sit to Supine: Stand by assistance  Scooting: Supervision    Transfers  Sit to stand: Contact guard assistance  Stand to sit: Contact guard assistance      Cognition  Overall Cognitive Status: Exceptions  Arousal/Alertness: Appropriate responses to stimuli  Following Commands: Follows one step commands with increased time; Follows one step commands with repetition  Attention Span: Difficulty dividing attention; Difficulty attending to directions  Memory: Decreased recall of recent events  Safety Judgement: Decreased awareness of need for safety;Decreased awareness of need for assistance  Problem Solving: Decreased awareness of errors  Insights: Decreased awareness of deficits  Initiation: Requires cues for some  Sequencing: Requires cues for some  Cognition Comment: Pt able to recognize physical deficits but unable to relate those deficits to everyday tasks. Additional Activities Comment  Additional Activities: Other  Additional Activities: Pt completed range of motion arc this session using RUE in order to address targeted reaching and  strength. Pt was able to transition 10 rings from R>L>R. Pt demoed decreased accuracy when attempting to isolate individual rings. Verbal cues to use vision to compensate. Pt distracted throughout, frequently attempting to use LUE. Pt continued to be perseverative on leaving hospital.      Plan   Plan  Times per week: 60 min; 5-7x week  Times per day: Daily  Current Treatment Recommendations: Strengthening, Balance Training, ROM, Functional Mobility Training, Neuromuscular Re-education, Safety Education & Training, Self-Care / ADL, Patient/Caregiver Education & Training, Cognitive/Perceptual Training    Goals  Short term goals  Time Frame for Short term goals: 7-10 days   Short term goal 1: Bathing w/ Min A-Goal met 1/15/2020  Short term goal 2: UB dressing w/ Min A-Goal met 1/15/2020  Short term goal 3: LB dressing w/ Min A  Short term goal 4: Functional transfer for ADL completion w/ Min A-Goal met 1/15/2020  Short term goal 5: Toileting w/ Min A  Long term goals  Time Frame for Long term goals : 3 weeks   Long term goal 1: Bathing w/ Mod I  Long term goal 2: UB dressing w/ Mod I  Long term goal 3: LB dressing w/ Mod I  Long term goal 4: Functional transfer for ADL completion w/ Mod I  Long term goal 5: Toileting w/ Mod I  Patient Goals   Patient goals :  \"To go home\"       Therapy Time   Individual Concurrent Group Co-treatment   Time In 1000         Time Out 1100         Minutes 60              Timed Code Treatment Minutes:  60  Total Treatment Minutes:  60 minutes       Holley Bradshaw OT Areta Libman OTR/ISAAC, MOT #[id number]

## 2020-01-17 NOTE — PROGRESS NOTES
Acute Rehab Unit  Stroke Education     Patient participated in stroke education group focused on topics of:  · Types of stroke  · Signs and symptoms  · Risk factors  · Results of a stroke  · Rehab after stroke    Patient:    [] was socially appropriate and engaged in conversation    - short, vague responses, appears to deflect information    [x] requires reinforcement of topics covered in group   [] caregiver was present, appropriate and engaged in coversation         Materials Provided:  Moving Forward After Stroke       Time spent:  12 minutes  (individual)    Therapist Signature:  Silvina Adams M.A.  CCC-SLP S.PBenny [phone]  Speech-Language Pathologist [phone]  1/17/2020 2:23 PM

## 2020-01-18 PROCEDURE — 1280000000 HC REHAB R&B

## 2020-01-18 PROCEDURE — 6360000002 HC RX W HCPCS: Performed by: PHYSICAL MEDICINE & REHABILITATION

## 2020-01-18 PROCEDURE — 6370000000 HC RX 637 (ALT 250 FOR IP): Performed by: PHYSICAL MEDICINE & REHABILITATION

## 2020-01-18 RX ADMIN — SENNOSIDES AND DOCUSATE SODIUM 2 TABLET: 8.6; 5 TABLET ORAL at 09:32

## 2020-01-18 RX ADMIN — FLUOXETINE 20 MG: 20 CAPSULE ORAL at 08:40

## 2020-01-18 RX ADMIN — LORAZEPAM 0.5 MG: 0.5 TABLET ORAL at 15:44

## 2020-01-18 RX ADMIN — ATORVASTATIN CALCIUM 80 MG: 80 TABLET, FILM COATED ORAL at 08:40

## 2020-01-18 RX ADMIN — ASPIRIN 81 MG 81 MG: 81 TABLET ORAL at 08:40

## 2020-01-18 RX ADMIN — ENOXAPARIN SODIUM 40 MG: 40 INJECTION SUBCUTANEOUS at 08:40

## 2020-01-18 RX ADMIN — CLOPIDOGREL 75 MG: 75 TABLET, FILM COATED ORAL at 09:32

## 2020-01-18 RX ADMIN — QUETIAPINE FUMARATE 50 MG: 25 TABLET ORAL at 22:47

## 2020-01-18 RX ADMIN — PANTOPRAZOLE SODIUM 40 MG: 40 TABLET, DELAYED RELEASE ORAL at 06:17

## 2020-01-18 RX ADMIN — CARVEDILOL 3.12 MG: 3.12 TABLET, FILM COATED ORAL at 15:44

## 2020-01-18 RX ADMIN — POLYETHYLENE GLYCOL 3350 17 G: 17 POWDER, FOR SOLUTION ORAL at 08:40

## 2020-01-18 RX ADMIN — CARVEDILOL 3.12 MG: 3.12 TABLET, FILM COATED ORAL at 09:31

## 2020-01-18 NOTE — PROGRESS NOTES
Pt called out went in immediately and pt was sitting at the foot of the bed feet on the floor, pt had voided all over himself and the table and floor, sts we did not come when called. Pt then laughed about it pt changed bed changed, and house keeping call and they responded and cleaned up the table and the floor of urine.

## 2020-01-19 PROCEDURE — 1280000000 HC REHAB R&B

## 2020-01-19 PROCEDURE — 6370000000 HC RX 637 (ALT 250 FOR IP): Performed by: PHYSICAL MEDICINE & REHABILITATION

## 2020-01-19 PROCEDURE — 6360000002 HC RX W HCPCS: Performed by: PHYSICAL MEDICINE & REHABILITATION

## 2020-01-19 RX ADMIN — CARVEDILOL 3.12 MG: 3.12 TABLET, FILM COATED ORAL at 08:32

## 2020-01-19 RX ADMIN — ATORVASTATIN CALCIUM 80 MG: 80 TABLET, FILM COATED ORAL at 08:32

## 2020-01-19 RX ADMIN — PANTOPRAZOLE SODIUM 40 MG: 40 TABLET, DELAYED RELEASE ORAL at 08:32

## 2020-01-19 RX ADMIN — ENOXAPARIN SODIUM 40 MG: 40 INJECTION SUBCUTANEOUS at 08:32

## 2020-01-19 RX ADMIN — CLOPIDOGREL 75 MG: 75 TABLET, FILM COATED ORAL at 08:32

## 2020-01-19 RX ADMIN — QUETIAPINE FUMARATE 50 MG: 25 TABLET ORAL at 22:52

## 2020-01-19 RX ADMIN — SENNOSIDES AND DOCUSATE SODIUM 2 TABLET: 8.6; 5 TABLET ORAL at 08:32

## 2020-01-19 RX ADMIN — POLYETHYLENE GLYCOL 3350 17 G: 17 POWDER, FOR SOLUTION ORAL at 08:32

## 2020-01-19 RX ADMIN — FLUOXETINE 20 MG: 20 CAPSULE ORAL at 08:32

## 2020-01-19 RX ADMIN — ASPIRIN 81 MG 81 MG: 81 TABLET ORAL at 08:32

## 2020-01-19 ASSESSMENT — PAIN SCALES - GENERAL: PAINLEVEL_OUTOF10: 0

## 2020-01-19 NOTE — PLAN OF CARE
Denies pain at this time, still remains very impulsive and gets out of bed without calling for help. Arguing with staff about being discharged today.  Call his  father to sit with him

## 2020-01-19 NOTE — PLAN OF CARE
Problem: Falls - Risk of:  Goal: Will remain free from falls  Description  Will remain free from falls  1/18/2020 2341 by Eliseo Blandon RN  Outcome: Ongoing  1/18/2020 1046 by Jennifer Licea RN  Outcome: Ongoing  Goal: Absence of physical injury  Description  Absence of physical injury  1/18/2020 2341 by Eliseo Blandon RN  Outcome: Ongoing  1/18/2020 1046 by Jennifer Licea RN  Outcome: Ongoing     Problem: SAFETY  Goal: LTG - Patient will demonstrate safety requirements appropriate to situation/environment  1/18/2020 2341 by Eliseo Blandon RN  Outcome: Ongoing  1/18/2020 1046 by Jennifer Licea RN  Outcome: Ongoing  Goal: STG - Patient uses call light consistently to request assistance with transfers  1/18/2020 2341 by Eliseo Blandon RN  Outcome: Ongoing  1/18/2020 1046 by Jennifer Licea RN  Outcome: Ongoing     Problem: IP BOWEL ELIMINATION  Goal: LTG - patient will have regular and routine bowel evacuation  1/18/2020 2341 by Eliseo Blandon RN  Outcome: Ongoing  1/18/2020 1046 by Jennifer Licea RN  Outcome: Ongoing  Goal: STG - patient will be accident free  1/18/2020 2341 by Eliseo Blandon RN  Outcome: Ongoing  1/18/2020 1046 by Jennifer Licea RN  Outcome: Ongoing     Problem: IP BLADDER/VOIDING  Goal: LTG - patient will achieve acceptable level of continence  1/18/2020 2341 by Eliseo Blandon RN  Outcome: Ongoing  1/18/2020 1046 by Jennifer Licea RN  Outcome: Ongoing  Goal: STG - patient will be able to empty bladder  1/18/2020 2341 by Eliseo lBandon RN  Outcome: Ongoing  1/18/2020 1046 by Jennifer Licea RN  Outcome: Ongoing     Problem: SKIN INTEGRITY  Goal: LTG - Patient will demonstrate appropriate pressure relief techniques  1/18/2020 2341 by Eliseo Blandon RN  Outcome: Ongoing  1/18/2020 1046 by Jennifer Licea RN  Outcome: Ongoing  Goal: STG - patient will maintain good skin integrity  1/18/2020 2341 by Eliseo Blandon RN  Outcome: Ongoing  1/18/2020 1046 by Nancy Mario RN  Outcome: Ongoing     Problem: Pain:  Goal: Pain level will decrease  Description  Pain level will decrease  1/18/2020 2341 by Oracio Nixon RN  Outcome: Ongoing  1/18/2020 1046 by Nancy Mario RN  Outcome: Ongoing  Goal: Control of acute pain  Description  Control of acute pain  1/18/2020 2341 by Oracio Nixon RN  Outcome: Ongoing  1/18/2020 1046 by Nancy Mario RN  Outcome: Ongoing  Goal: Control of chronic pain  Description  Control of chronic pain  1/18/2020 2341 by Oracio Nixon RN  Outcome: Ongoing  1/18/2020 1046 by Nancy Mario RN  Outcome: Ongoing     Problem: Neurological  Goal: Maximum potential motor/sensory/cognitive function  1/18/2020 2341 by Oracio Nixon RN  Outcome: Ongoing  1/18/2020 1046 by Nancy Mario RN  Outcome: Ongoing

## 2020-01-20 LAB
ANION GAP SERPL CALCULATED.3IONS-SCNC: 11 MMOL/L (ref 3–16)
BUN BLDV-MCNC: 16 MG/DL (ref 7–20)
CALCIUM SERPL-MCNC: 9.2 MG/DL (ref 8.3–10.6)
CHLORIDE BLD-SCNC: 110 MMOL/L (ref 99–110)
CO2: 24 MMOL/L (ref 21–32)
CREAT SERPL-MCNC: 0.9 MG/DL (ref 0.8–1.3)
GFR AFRICAN AMERICAN: >60
GFR NON-AFRICAN AMERICAN: >60
GLUCOSE BLD-MCNC: 99 MG/DL (ref 70–99)
HCT VFR BLD CALC: 45.5 % (ref 40.5–52.5)
HEMOGLOBIN: 15.1 G/DL (ref 13.5–17.5)
MCH RBC QN AUTO: 31 PG (ref 26–34)
MCHC RBC AUTO-ENTMCNC: 33.2 G/DL (ref 31–36)
MCV RBC AUTO: 93.5 FL (ref 80–100)
PDW BLD-RTO: 13.5 % (ref 12.4–15.4)
PLATELET # BLD: 204 K/UL (ref 135–450)
PMV BLD AUTO: 9.1 FL (ref 5–10.5)
POTASSIUM SERPL-SCNC: 4 MMOL/L (ref 3.5–5.1)
RBC # BLD: 4.87 M/UL (ref 4.2–5.9)
SODIUM BLD-SCNC: 145 MMOL/L (ref 136–145)
WBC # BLD: 7.3 K/UL (ref 4–11)

## 2020-01-20 PROCEDURE — 36415 COLL VENOUS BLD VENIPUNCTURE: CPT

## 2020-01-20 PROCEDURE — 1280000000 HC REHAB R&B

## 2020-01-20 PROCEDURE — 97129 THER IVNTJ 1ST 15 MIN: CPT

## 2020-01-20 PROCEDURE — 97130 THER IVNTJ EA ADDL 15 MIN: CPT

## 2020-01-20 PROCEDURE — 6370000000 HC RX 637 (ALT 250 FOR IP): Performed by: PHYSICAL MEDICINE & REHABILITATION

## 2020-01-20 PROCEDURE — 6360000002 HC RX W HCPCS: Performed by: PHYSICAL MEDICINE & REHABILITATION

## 2020-01-20 PROCEDURE — 97116 GAIT TRAINING THERAPY: CPT

## 2020-01-20 PROCEDURE — 97530 THERAPEUTIC ACTIVITIES: CPT

## 2020-01-20 PROCEDURE — 80048 BASIC METABOLIC PNL TOTAL CA: CPT

## 2020-01-20 PROCEDURE — 92526 ORAL FUNCTION THERAPY: CPT

## 2020-01-20 PROCEDURE — 85027 COMPLETE CBC AUTOMATED: CPT

## 2020-01-20 PROCEDURE — 97535 SELF CARE MNGMENT TRAINING: CPT

## 2020-01-20 RX ADMIN — PANTOPRAZOLE SODIUM 40 MG: 40 TABLET, DELAYED RELEASE ORAL at 05:56

## 2020-01-20 RX ADMIN — LORAZEPAM 0.5 MG: 0.5 TABLET ORAL at 15:03

## 2020-01-20 RX ADMIN — ATORVASTATIN CALCIUM 80 MG: 80 TABLET, FILM COATED ORAL at 09:23

## 2020-01-20 RX ADMIN — PANTOPRAZOLE SODIUM 40 MG: 40 TABLET, DELAYED RELEASE ORAL at 09:23

## 2020-01-20 RX ADMIN — ENOXAPARIN SODIUM 40 MG: 40 INJECTION SUBCUTANEOUS at 09:24

## 2020-01-20 RX ADMIN — SENNOSIDES AND DOCUSATE SODIUM 2 TABLET: 8.6; 5 TABLET ORAL at 22:01

## 2020-01-20 RX ADMIN — CARVEDILOL 3.12 MG: 3.12 TABLET, FILM COATED ORAL at 15:03

## 2020-01-20 RX ADMIN — ASPIRIN 81 MG 81 MG: 81 TABLET ORAL at 09:23

## 2020-01-20 RX ADMIN — CLOPIDOGREL 75 MG: 75 TABLET, FILM COATED ORAL at 09:34

## 2020-01-20 RX ADMIN — CARVEDILOL 3.12 MG: 3.12 TABLET, FILM COATED ORAL at 09:23

## 2020-01-20 RX ADMIN — QUETIAPINE FUMARATE 50 MG: 25 TABLET ORAL at 22:01

## 2020-01-20 RX ADMIN — FLUOXETINE 20 MG: 20 CAPSULE ORAL at 09:23

## 2020-01-20 ASSESSMENT — PAIN SCALES - GENERAL
PAINLEVEL_OUTOF10: 0

## 2020-01-20 NOTE — PLAN OF CARE
Problem: Falls - Risk of:  Goal: Will remain free from falls  Description  Will remain free from falls  1/20/2020 1023 by Linn Upton RN  Outcome: Ongoing  1/20/2020 0048 by John Ramirez RN  Outcome: Ongoing  Goal: Absence of physical injury  Description  Absence of physical injury  1/20/2020 1023 by Linn Upton RN  Outcome: Ongoing  1/20/2020 0048 by John Ramirez RN  Outcome: Ongoing     Problem: SAFETY  Goal: LTG - Patient will demonstrate safety requirements appropriate to situation/environment  1/20/2020 1023 by Linn Upton RN  Outcome: Ongoing  1/20/2020 0048 by John Ramirez RN  Outcome: Ongoing  Goal: STG - Patient uses call light consistently to request assistance with transfers  1/20/2020 1023 by Linn Upton RN  Outcome: Ongoing  1/20/2020 0048 by John Ramirez RN  Outcome: Ongoing     Problem: IP BOWEL ELIMINATION  Goal: LTG - patient will have regular and routine bowel evacuation  1/20/2020 1023 by Linn Upton RN  Outcome: Ongoing  1/20/2020 0048 by John Ramirez RN  Outcome: Ongoing  Goal: STG - patient will be accident free  1/20/2020 1023 by Linn Upton RN  Outcome: Ongoing  1/20/2020 0048 by John Ramirez RN  Outcome: Ongoing     Problem: IP BLADDER/VOIDING  Goal: LTG - patient will achieve acceptable level of continence  1/20/2020 1023 by Linn Upton RN  Outcome: Ongoing  1/20/2020 0048 by John Ramirez RN  Outcome: Ongoing  Goal: STG - patient will be able to empty bladder  1/20/2020 1023 by Linn Upton RN  Outcome: Ongoing  1/20/2020 0048 by John Ramirez RN  Outcome: Ongoing     Problem: SKIN INTEGRITY  Goal: LTG - Patient will demonstrate appropriate pressure relief techniques  1/20/2020 1023 by Linn Upton RN  Outcome: Ongoing  1/20/2020 0048 by John Ramirez RN  Outcome: Ongoing  Goal: STG - patient will maintain good skin integrity  1/20/2020 1023 by Linn Upton RN  Outcome: Ongoing  1/20/2020 6942 by Clint Mcconnell RN  Outcome: Ongoing     Problem: Pain:  Goal: Pain level will decrease  Description  Pain level will decrease  1/20/2020 1023 by Jerry Sheriff RN  Outcome: Ongoing  1/20/2020 0048 by Clint Mcconnell RN  Outcome: Ongoing  Goal: Control of acute pain  Description  Control of acute pain  1/20/2020 1023 by Jerry Sheriff RN  Outcome: Ongoing  1/20/2020 0048 by Clint Mcconnell RN  Outcome: Ongoing  Goal: Control of chronic pain  Description  Control of chronic pain  1/20/2020 1023 by Jerry Sheriff RN  Outcome: Ongoing  1/20/2020 0048 by Clint Mcconnell RN  Outcome: Ongoing     Problem: Neurological  Goal: Maximum potential motor/sensory/cognitive function  1/20/2020 1023 by Jerry Sheriff RN  Outcome: Ongoing  1/20/2020 0048 by Clint Mcconnell RN  Outcome: Ongoing

## 2020-01-20 NOTE — CARE COORDINATION
Called pt's step father, Jud Settles to discuss discharge to [facility]. Emailed Maxim the list at [email or web address]. Precert and HENS needed.  Daya Middleton, MSW, LSW

## 2020-01-20 NOTE — PROGRESS NOTES
Mary Grace Laclede  1/20/2020  [id number]    Chief Complaint: Acute ischemic stroke [facility])    Subjective:   No significant weekend events. No current complaints. Labs reviewed. ROS: No CP, SOB, dyspnea    Objective:  Patient Vitals for the past 24 hrs:   BP Temp Temp src Pulse Resp SpO2 Weight   01/20/20 0800 132/84 98 °F (36.7 °C) Oral 67 16 98 % --   01/20/20 0628 -- -- -- -- -- -- 202 lb 1.6 oz (91.7 kg)   01/19/20 2245 (!) 99/57 97.9 °F (36.6 °C) Oral 65 18 96 % --     Gen: No distress, pleasant. Resting in WC  HEENT: Normocephalic, atraumatic  CV: Regular rate and rhythm. No MRG   Resp: No respiratory distress. CTAB   Abd: Soft, nontender nondistended  Ext: No edema   Neuro: Alert, oriented, appropriately interactive. Right sided neglect but improving, RUE 4/5, RLE 2/5, 2/4 MAS    Laboratory data: Available via EMR. Therapy progress:  PT  Position Activity Restriction  Other position/activity restrictions: 68-year-old male with a history of CAD, CK-MB, HTN, HLD, and status post ICD placement who was admitted on 1/3 with acute right-sided weakness. CTH revealed acute left thalamic and temporal lobe infarcts. CTA revealed an occlusion of the left PCA. echo with an EF of 35 percent and negative bubble study. . No A1c reported. Of note he did have a UDS which was positive for amphetamines. He was evaluated by neurology who considered anticoagulation but declined due to prior non-adherence. He was evaluated by therapy and suggested to continue an inpatient setting prior to returning home. Objective     Sit to Stand: Minimal Assistance(fluctuating between CGA and Min A throughout the session. Requires VCs for UE/LE placement and sequencing)  Stand to sit: Minimal Assistance(fluctuating between CGA and Min A throughout the session.  Requires VCs for UE/LE placement and sequencing)  Bed to Chair: Maximum assistance(VCs to pt to position feet before completing transfer)  Device: Rolling Walker  Other AM    * This document was created using dictation software. While all precautions were taken to ensure accuracy, errors may have occurred. Please disregard any typographical errors.

## 2020-01-20 NOTE — PLAN OF CARE
Problem: Falls - Risk of:  Goal: Will remain free from falls  Description  Will remain free from falls  Outcome: Ongoing  Goal: Absence of physical injury  Description  Absence of physical injury  Outcome: Ongoing     Problem: SAFETY  Goal: LTG - Patient will demonstrate safety requirements appropriate to situation/environment  Outcome: Ongoing  Goal: STG - Patient uses call light consistently to request assistance with transfers  Outcome: Ongoing     Problem: IP BOWEL ELIMINATION  Goal: LTG - patient will have regular and routine bowel evacuation  Outcome: Ongoing  Goal: STG - patient will be accident free  Outcome: Ongoing     Problem: IP BLADDER/VOIDING  Goal: LTG - patient will achieve acceptable level of continence  Outcome: Ongoing  Goal: STG - patient will be able to empty bladder  Outcome: Ongoing     Problem: SKIN INTEGRITY  Goal: LTG - Patient will demonstrate appropriate pressure relief techniques  Outcome: Ongoing  Goal: STG - patient will maintain good skin integrity  Outcome: Ongoing     Problem: Pain:  Goal: Pain level will decrease  Description  Pain level will decrease  Outcome: Ongoing  Goal: Control of acute pain  Description  Control of acute pain  Outcome: Ongoing  Goal: Control of chronic pain  Description  Control of chronic pain  Outcome: Ongoing     Problem: Neurological  Goal: Maximum potential motor/sensory/cognitive function  Outcome: Ongoing

## 2020-01-20 NOTE — PROGRESS NOTES
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   Bebe Garza [address] [place] [address]

## 2020-01-20 NOTE — PROGRESS NOTES
Physical Therapy  Facility/Department: [facility] 5C/ARU  Daily Treatment Note  NAME: Gita Carnes  : 1960  MRN: [id number]    Date of Service: 2020    Discharge Recommendations:  Home with Home health PT, 24 hour supervision or assist   PT Equipment Recommendations  Equipment Needed: Yes  Mobility Devices: Orpha Close; Wheelchair  Walker: Rolling  Wheelchair: Standard  Other: above equipment and R hand splint for [employer] structures, Functions, Activity limitations: Decreased functional mobility ; Decreased strength;Decreased endurance;Decreased ADL status; Decreased safe awareness;Decreased sensation;Decreased cognition;Decreased balance;Decreased posture;Decreased coordination  Assessment: Pt displayed improved functional ability throughout this session, including improved ambulation distance and decreased assistance for transfers and stairs. Pt continues to display inattention to R side and deficits when setting up transfers. Pt would continue to benefit from skilled PT to improve functional independence, safety and R sided function. Treatment Diagnosis: R inattention, weakness, impaired sensation secondary to CVA  Prognosis: Good  Decision Making: Medium Complexity  PT Education: Goals;PT Role;Plan of Care;General Safety;Transfer Training;Gait Training;Functional Mobility Training  Patient Education: Educated on proper sequencing for ascending/descending stairs. REQUIRES PT FOLLOW UP: Yes  Activity Tolerance  Activity Tolerance: Patient Tolerated treatment well;Patient limited by endurance; Patient limited by cognitive status  Activity Tolerance: Pt limited by endurance and impulsivity throughout functional mobility      Patient Diagnosis(es): CVA   has a past medical history of CAD (coronary artery disease), Chronic kidney disease, Collapsed lung, GERD (gastroesophageal reflux disease), Hematuria, Hyperlipidemia, Hypertension, ICD (implantable cardioverter-defibrillator) in place, Mural

## 2020-01-21 PROCEDURE — 6360000002 HC RX W HCPCS: Performed by: PHYSICAL MEDICINE & REHABILITATION

## 2020-01-21 PROCEDURE — 97130 THER IVNTJ EA ADDL 15 MIN: CPT

## 2020-01-21 PROCEDURE — 6370000000 HC RX 637 (ALT 250 FOR IP): Performed by: PHYSICAL MEDICINE & REHABILITATION

## 2020-01-21 PROCEDURE — 97530 THERAPEUTIC ACTIVITIES: CPT

## 2020-01-21 PROCEDURE — 97535 SELF CARE MNGMENT TRAINING: CPT

## 2020-01-21 PROCEDURE — 97116 GAIT TRAINING THERAPY: CPT

## 2020-01-21 PROCEDURE — 97129 THER IVNTJ 1ST 15 MIN: CPT

## 2020-01-21 PROCEDURE — 1280000000 HC REHAB R&B

## 2020-01-21 RX ORDER — LORAZEPAM 0.5 MG/1
0.5 TABLET ORAL EVERY 6 HOURS PRN
Qty: 10 TABLET | Refills: 0 | Status: SHIPPED | OUTPATIENT
Start: 2020-01-21 | End: 2020-02-05

## 2020-01-21 RX ORDER — CARVEDILOL 3.12 MG/1
3.12 TABLET ORAL 2 TIMES DAILY WITH MEALS
Qty: 60 TABLET | Refills: 3 | Status: SHIPPED | OUTPATIENT
Start: 2020-01-21 | End: 2020-04-13

## 2020-01-21 RX ORDER — NICOTINE 21 MG/24HR
1 PATCH, TRANSDERMAL 24 HOURS TRANSDERMAL DAILY
Qty: 30 PATCH | Refills: 3
Start: 2020-01-21 | End: 2020-07-09

## 2020-01-21 RX ORDER — POLYETHYLENE GLYCOL 3350 17 G/17G
17 POWDER, FOR SOLUTION ORAL DAILY
Qty: 527 G | Refills: 1
Start: 2020-01-21 | End: 2020-02-20

## 2020-01-21 RX ADMIN — LORAZEPAM 0.5 MG: 0.5 TABLET ORAL at 16:38

## 2020-01-21 RX ADMIN — ENOXAPARIN SODIUM 40 MG: 40 INJECTION SUBCUTANEOUS at 09:54

## 2020-01-21 RX ADMIN — CLOPIDOGREL 75 MG: 75 TABLET, FILM COATED ORAL at 09:54

## 2020-01-21 RX ADMIN — ASPIRIN 81 MG 81 MG: 81 TABLET ORAL at 09:55

## 2020-01-21 RX ADMIN — ATORVASTATIN CALCIUM 80 MG: 80 TABLET, FILM COATED ORAL at 09:54

## 2020-01-21 RX ADMIN — PANTOPRAZOLE SODIUM 40 MG: 40 TABLET, DELAYED RELEASE ORAL at 07:41

## 2020-01-21 RX ADMIN — FLUOXETINE 20 MG: 20 CAPSULE ORAL at 09:55

## 2020-01-21 RX ADMIN — CARVEDILOL 3.12 MG: 3.12 TABLET, FILM COATED ORAL at 16:38

## 2020-01-21 RX ADMIN — CARVEDILOL 3.12 MG: 3.12 TABLET, FILM COATED ORAL at 09:55

## 2020-01-21 RX ADMIN — QUETIAPINE FUMARATE 50 MG: 25 TABLET ORAL at 20:52

## 2020-01-21 ASSESSMENT — PAIN SCALES - GENERAL
PAINLEVEL_OUTOF10: 0

## 2020-01-21 NOTE — PLAN OF CARE
Cecilio Martin RN  Outcome: Ongoing     Problem: Pain:  Description  Pain management should include both nonpharmacologic and pharmacologic interventions.   Goal: Pain level will decrease  Description  Pain level will decrease  1/21/2020 1213 by Ivelisse Ziegler RN  Outcome: Completed  1/21/2020 1121 by Ivelisse Ziegler RN  Outcome: Ongoing  Goal: Control of acute pain  Description  Control of acute pain  1/21/2020 1213 by Ivelisse Ziegler RN  Outcome: Completed  1/21/2020 1121 by Ivelisse Ziegler RN  Outcome: Ongoing  Goal: Control of chronic pain  Description  Control of chronic pain  1/21/2020 1213 by Ivelisse Ziegler RN  Outcome: Completed  1/21/2020 1121 by Ivelisse Ziegler RN  Outcome: Ongoing     Problem: Neurological  Goal: Maximum potential motor/sensory/cognitive function  1/21/2020 1213 by Ivelisse Ziegler RN  Outcome: Completed  1/21/2020 1121 by Ivelisse Ziegler RN  Outcome: Ongoing

## 2020-01-21 NOTE — PLAN OF CARE
Problem: Falls - Risk of:  Goal: Will remain free from falls  Description  Will remain free from falls  Outcome: Ongoing  Goal: Absence of physical injury  Description  Absence of physical injury  Outcome: Ongoing     Problem: SAFETY  Goal: LTG - Patient will demonstrate safety requirements appropriate to situation/environment  Outcome: Ongoing  Goal: STG - Patient uses call light consistently to request assistance with transfers  Outcome: Ongoing     Problem: IP BOWEL ELIMINATION  Goal: LTG - patient will have regular and routine bowel evacuation  Outcome: Ongoing  Goal: STG - patient will be accident free  Outcome: Ongoing     Problem: IP BLADDER/VOIDING  Goal: LTG - patient will achieve acceptable level of continence  Outcome: Ongoing  Goal: STG - patient will be able to empty bladder  Outcome: Ongoing     Problem: SKIN INTEGRITY  Goal: LTG - Patient will demonstrate appropriate pressure relief techniques  Outcome: Ongoing  Goal: STG - patient will maintain good skin integrity  Outcome: Ongoing     Problem: Pain:  Description  Pain management should include both nonpharmacologic and pharmacologic interventions.   Goal: Pain level will decrease  Description  Pain level will decrease  Outcome: Ongoing  Goal: Control of acute pain  Description  Control of acute pain  Outcome: Ongoing  Goal: Control of chronic pain  Description  Control of chronic pain  Outcome: Ongoing     Problem: Neurological  Goal: Maximum potential motor/sensory/cognitive function  Outcome: Ongoing

## 2020-01-21 NOTE — PROGRESS NOTES
ACUTE REHAB UNIT  SPEECH/LANGUAGE PATHOLOGY      [x] Daily  [] Weekly Care Conference Note  [x] Discharge    Patient:Kedar Angel     :1960  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 Subjective     Pt found lying in bed resting. With encouragement willing to have [facility] upright for session. Pt pleasant, cooperative and more engaged in conversation with SLP this date. Pt making progress towards dysphagia/ cognitive goals. Insight is emerging, however, continues to require education for carryover. Pt states he will be at [facility] for 2 weeks then go home. Despite progress, suspect pt will still require initial 24 hour supervision at d/c for higher level executive function and problem solving tasks. Pt to be d/c'd to SNF for further therapy. Continue to target dysphagia/ cognitive goals. Objective:  Goals     The patient will tolerate recommended diet without observed clinical signs of aspiration. Goal not targeted this session. GOAL MET (for current soft and bite sized diet)    The patient will recall and perform compensatory strategies, with no cues. Goal not targeted this session. Goal not met    The patient will tolerate regular consistency solids 10/10. Goal not targeted this session. Goal not met    Pt will improve speech intelligibility in connected speech via graded tasks to 90% accuracy, min cues.    GOAL MET GOAL MET   Pt will improve auditory processing/comprehension of commands and questions to 80% accuracy, via graded tasks, with min cues. GOAL MET    GOAL MET    Pt will improve attention to detail for improved recall and retention of newly learned information with 80% accuracy or min cues. - Perspective Memory (Recall of upcoming events) (aware of potential d/c to SNF). Pt with gross approximation to facility name stating [facility]  - Recall of recent events- Able to recall fall last night, having a shower, working with PT, niece visiting previous date and bringing flowers, unable to recall all breakfast items consumed this AM     Sorting and Remembering Categories (pictured objects)   - IMM recalled 3/3 categories  - 9/12 items (75%) min cues x 1   - Short term recall following a 5 minute delay- recalled 2/3 categories, 9/12 (75%) items with min cues x 3   - Pt with better insight into deficits stating, \"I'm not doing very good on this am I?\"    - Alternating Attention (suits in ascending order) 23/26 (88%) in 3:36 occasional reduced attention/ concentration to task with pt talking attempting to talk to SLP simultaneously    Progressing (80% acc this date, continue to target to ensure accuracy across sessions)    Pt will improve verbal expression/pragmatic skills for affect, eye contact, turn taking and appropriate content in conversation via graded tasks with 80% accuracy, min cues.    - Improved eye contact/ responses to questions  - Engaged in appropriate conversation with SLP and requested to talk more vs completing another activity  - Adequate turn taking/ adequate topic maintenance   Emerging, continue    Pt will improve executive function for multifactor task completion via graded tasks to 80% accuracy, min cues   - Fx Money problems- 9/10 (90%) Independently, 10/10 (100%) min cues   - Required max assistance to unlock cell phone and locate calculator in [employer] fidelia  - Following If/Then condition directions- 6/8 (75%) min cues x 4   Goal met for this date, continue

## 2020-01-21 NOTE — PROGRESS NOTES
Recommendations  Equipment Needed: Yes  Mobility Devices: Isa Post, Wheelchair  Walker: Rolling  Wheelchair: Standard  Other: above equipment and R hand splint for [facility]     Assessment        SLP  Current Diet : Dysphagia Soft and Bite-Sized (Dysphagia III)  Current Liquid Diet : Thin  Diet Solids Recommendation: Dysphagia Soft and Bite-Sized (Dysphagia III)  Liquid Consistency Recommendation: Thin    Body mass index is 27.41 kg/m². Assessment:  Patient Active Problem List   Diagnosis    Cellulitis    Hyponatremia    Arm pain    Essential hypertension    Ischemic chest pain (HCC)    Mixed hyperlipidemia    ARF (acute renal failure) (HCC)    Automatic implantable cardioverter-defibrillator in situ    Tobacco abuse    Primary cardiomyopathy ([facility]    CAD (coronary artery disease)    Ischemic chest pain ([facility]    Unstable angina ([address]    Chest pain    Cocaine abuse ([facility]    Ischemic cardiomyopathy    Hematochezia    Acute cerebrovascular accident (CVA) ([facility]    Oropharyngeal dysphagia    Organic mood disorder    Current episode of major depressive disorder without prior episode    Acute ischemic stroke ([facility]       Plan:   Acute left thalamic and temporal lobe infarcts: PT/OT for hemiplegia  Large right frontal lobe encephalomalacia: question stimulant abuse as coping mechanism for focus and attention. Trialed low-dose ritalin without improvement. Prozac for motor recovery     Hypotension: s/p IVF, adjusted coreg     Agitation: environmental precautions. Ativan PRN. Scheduled seroquel HS.      Dysphagia: SLP     HLD: lipitor 80     HTN: coreg decreased     CAD: ASA, statin     Cardiomyopathy: EF 35%, daily weights, coreg     Amphetamine abuse: education     Tobacco abuse: nicoderm 14      Bowels: Per protocol  Bladder: Per protocol   Sleep: Seroquel  Pain: tylenol PRN  DVT PPx: Lovenox     Dispo: SNF dispo pending insurance approval. Hopeful for today vs tomorrow.     Team conference was held today on the patient and discussed directly with the patient utilizing their entire treatment team. Please see separate team note for details. Total treatment time for today's care >32 min. Rosi Neves MD 1/21/2020, 1:44 PM    * This document was created using dictation software. While all precautions were taken to ensure accuracy, errors may have occurred. Please disregard any typographical errors.

## 2020-01-21 NOTE — PROGRESS NOTES
Physical Therapy  Facility/Department: [address]  Daily Treatment Note/Discharge Note  NAME: Norm Joyner  : 1960  MRN: [id number]    Date of Service: 2020    Discharge Recommendations:  Home with Home health PT, 24 hour supervision or assist   PT Equipment Recommendations  Equipment Needed: Yes  Mobility Devices: [place]; Wheelchair  Walker: Rolling  Wheelchair: Standard  Other: above equipment and R hand splint for [employer] structures, Functions, Activity limitations: Decreased functional mobility ; Decreased strength;Decreased endurance;Decreased ADL status; Decreased safe awareness;Decreased sensation;Decreased cognition;Decreased balance;Decreased posture;Decreased coordination  Assessment: Pt displays improved ability to ambulate further distance with an increase in R LE control. Pt frequently stated his desire to go home but has been agreeable to therapy and willing to participate throughout our session. Pt's ability to ascend/descend stairs has improved and the pt needs only CGA and VCs to complete a flight. Pt's transfers have improved, he is able to complete transfers with reduced assistance and is beginning to properly set up and sequence the transfers with minimal cueing. Pt continues to display deficits in R side and would continue to benefit from skilled PT to improve functional mobility and optimize independence. Treatment Diagnosis: R inattention, weakness, impaired sensation secondary to CVA  Prognosis: Good  PT Education: Goals;PT Role;Plan of Care;General Safety;Transfer Training;Gait Training;Functional Mobility Training  Patient Education: Pt educated on importance of participating in physical therapy to improve functional mobility and decrease deficits.    REQUIRES PT FOLLOW UP: Yes  Activity Tolerance  Activity Tolerance: Patient Tolerated treatment well;Patient limited by endurance  Activity Tolerance: Pt limited by endurance and impulsivity throughout functional mobility      Patient Diagnosis(es): CVA   has a past medical history of CAD (coronary artery disease), Chronic kidney disease, Collapsed lung, GERD (gastroesophageal reflux disease), Hematuria, Hyperlipidemia, Hypertension, ICD (implantable cardioverter-defibrillator) in place, Mural thrombus of left ventricle, and Tortuous aorta ([facility] has a past surgical history that includes Colon surgery; Cholecystectomy, open; Arm Debridement (10-26-10); Cardiac surgery; Colonoscopy (07/18/2016); pacemaker placement (Left, 2013); Upper gastrointestinal endoscopy (05/01/2017); Upper gastrointestinal endoscopy (06/27/2017); and Colonoscopy (11/20/2019). Restrictions  Restrictions/Precautions  Restrictions/Precautions: Fall Risk  Required Braces or Orthoses?: No  Position Activity Restriction  Other position/activity restrictions: 42-year-old male with a history of CAD, CK-MB, HTN, HLD, and status post ICD placement who was admitted on 1/3 with acute right-sided weakness. CTH revealed acute left thalamic and temporal lobe infarcts. CTA revealed an occlusion of the left PCA. echo with an EF of 35 percent and negative bubble study. . No A1c reported. Of note he did have a UDS which was positive for amphetamines. He was evaluated by neurology who considered anticoagulation but declined due to prior non-adherence. He was evaluated by therapy and suggested to continue an inpatient setting prior to returning home.     Subjective   General  Chart Reviewed: Yes  Family / Caregiver Present: No  Subjective  Subjective: Pt agreeable to therapy session   General Comment  Comments: pt in recliner upon arrival       Objective   Bed mobility  Bridging: Independent  Rolling to Left: Modified independent  Rolling to Right: Modified independent  Supine to Sit: Modified independent  Sit to Supine: Modified independent  Scooting: Independent  Comment: bed flat with use for hand rails for rolling and supine <> sit   Transfers  Sit to

## 2020-01-21 NOTE — CARE COORDINATION
Spoke with Taylor Cesar at [facility] and she will begin precert today.  Dc Orta, MSW, LSW

## 2020-01-21 NOTE — CARE COORDINATION
Spoke with [facility] at [facility] and she does not have a bed available, the only bed she has is in a semi private at [facility] which couldn't accommodate pt d/t behaviors. Called pt's stepfather, Francisco Laughlin to choose a different facility- he will work on it and call this worker back.  Emma Pettit, MSW, LSW

## 2020-01-21 NOTE — PROGRESS NOTES
Occupational Therapy  Facility/Department: [facility] 5C/ARU  Daily Treatment Note & Discharge Summary   NAME: Rafi Painting  : 1960  MRN: [id number]    Date of Service: 2020    Discharge Recommendations:  Subacute/Skilled Nursing Facility  OT Equipment Recommendations  Equipment Needed: No  Other: Defer to discharge facility     Assessment   Performance deficits / Impairments: Decreased functional mobility ; Decreased ROM; Decreased strength;Decreased sensation;Decreased balance;Decreased ADL status; Decreased safe awareness;Decreased coordination;Decreased cognition;Decreased vision/visual deficit  Assessment: Pt continues to require hands on assist w/ functional mobility, transfers, and ADL completion. Pt is limited d/t behavior, R visual inattention, cognition. Pt would benefit from continued therapy during inpatient stay in order to maximize safety and independence at discharge  Treatment Diagnosis: Multiple performance deficits s/p CVA  OT Education: Transfer Training;OT Role;ADL Adaptive Strategies  Patient Education: vc to visually attend to [facility] UP: Yes  Activity Tolerance  Activity Tolerance: Patient Tolerated treatment well  Safety Devices  Safety Devices in place: Yes  Type of devices: Call light within reach; All fall risk precautions in place; Left in chair;Chair alarm in place         Patient Diagnosis(es): There were no encounter diagnoses. has a past medical history of CAD (coronary artery disease), Chronic kidney disease, Collapsed lung, GERD (gastroesophageal reflux disease), Hematuria, Hyperlipidemia, Hypertension, ICD (implantable cardioverter-defibrillator) in place, Mural thrombus of left ventricle, and Tortuous aorta ([facility] has a past surgical history that includes Colon surgery; Cholecystectomy, open; Arm Debridement (10-26-10); Cardiac surgery; Colonoscopy (2016); pacemaker placement (Left, ); Upper gastrointestinal endoscopy (2017);  Upper Pt completed grooming tasks in stance at seat. Set up w/ breakfast at end of session. Balance  Sitting Balance: Contact guard assistance  Standing Balance: Contact guard assistance  Standing Balance  Time: ~5 min  Activity: To/from bathroom, transfers, ADL completion  Comment: Verbal cues to attend to R side, HOHA for R UE placement     Functional Mobility  Functional - Mobility Device: Rolling Walker  Activity: To/from bathroom  Assist Level: Contact guard assistance    Shower Transfers  Shower - Transfer From: Julius Cutlera - Transfer Type: To and From  Shower - Transfer To: Other  Shower - Technique: Ambulating  Shower Transfers: Contact Guard  Shower Transfers Comments: Verbal cues for technique/safety     Bed mobility  Supine to Sit: Supervision  Scooting: Supervision  Transfers  Sit to stand: Contact guard assistance  Stand to sit: Contact guard assistance      Cognition  Overall Cognitive Status: Exceptions  Arousal/Alertness: Appropriate responses to stimuli  Following Commands: Follows one step commands with increased time; Follows one step commands with repetition  Attention Span: Difficulty dividing attention; Difficulty attending to directions  Memory: Decreased recall of recent events  Safety Judgement: Decreased awareness of need for safety;Decreased awareness of need for assistance  Problem Solving: Decreased awareness of errors  Insights: Decreased awareness of deficits  Initiation: Requires cues for some  Sequencing: Requires cues for some  Cognition Comment: Pt able to recognize physical deficits but unable to relate those deficits to everyday tasks. Second Session: Pt supine in bed upon entry, agreeable to OT session w/ encouragement. Pt denies pain. Supine>sit=Supervision. Pt ambulated to recliner w/ CGA and rw. Pt seated in recliner, doffed soiled shirt and donned gown w/ set up.  Pt ambulated 270ft in hallway w/ rw, consistent verbal cues to grasp rw w/ R hand, verbal cues for Out 0811, 1332         Minutes 41, 28              Timed Code Treatment Minutes: 41 +28  Total Treatment Minutes: 69 minutes      Holley Bradshaw, OT   Holley Bradshaw OTR/L, [place] #[id number]

## 2020-01-21 NOTE — PROGRESS NOTES
Dr. Migdalia Jeffers called to notify patient slid out of low bed onto floor. VVS . No injury . No new ordered. Remains in low bed. Bed locked. Mats on both sides of bed. Call light in place. Camera remains in room.

## 2020-01-21 NOTE — DISCHARGE SUMMARY
Physical Medicine & Rehabilitation  Discharge Summary     Patient Identification:  Leeanna Closs  : 1960  Admit date: 2020  Discharge date: 2020  Attending provider: Toan Terry MD        Primary care provider: Malaika Ruiz MD     Discharge Diagnoses:   Patient Active Problem List   Diagnosis    Cellulitis    Hyponatremia    Arm pain    Essential hypertension    [facility]    Mixed hyperlipidemia    ARF (acute renal failure) ([facility]    Automatic implantable cardioverter-defibrillator in situ    Tobacco abuse    Primary cardiomyopathy ([facility] [facility]    CAD (coronary artery disease)    Ischemic chest pain ([facility]    Unstable angina ([facility]    Chest pain    Cocaine abuse ([facility]    Ischemic cardiomyopathy    Hematochezia    Acute cerebrovascular accident (CVA) ([facility]    Oropharyngeal dysphagia    Organic mood disorder    Current episode of major depressive disorder without prior episode    Acute ischemic stroke [facility])       Discharge Functional Status:    Physical therapy:  Bed Mobility: Scooting: Supervision  Transfers: Sit to Stand: Contact guard assistance(VCs for LE/UE set up )  Stand to sit: Contact guard assistance(VCs for UE/LE set up)  Bed to Chair: Maximum assistance(VCs to pt to position feet before completing transfer)  Comment: 1st session: See above functional activity, Ambulation 1  Surface: level tile  Device: Rolling Walker  Other Apparatus: Right(R hand splint attachment)  Assistance: Minimal assistance  Quality of Gait: Reciprocal pattern with narrow KATE and reduced R foot clearance   Distance: 100' and 175'   Comments: Requires tactile cueing for lateral weight shifting to the R and assistance with RW management.  VCs throughout to increase R foot clearance and to attend to R hand by improving  on RW. , Stairs  # Steps : 13  Stairs Height: 6\"  Rails: Left ascending  Assistance: Contact guard assistance  Comment: VCs for R foot placement to improve KATE on step. Mobility:  , PT Equipment Recommendations  Equipment Needed: Yes  Mobility Devices: Oonair, Wheelchair  Walker: Rolling  Wheelchair: Standard  Other: above equipment and R hand splint for RW, Assessment: Pt displayed improved functional ability throughout this session, including improved ambulation distance and decreased assistance for transfers and stairs. Pt continues to display inattention to R side and deficits when setting up transfers. Pt woulc continue to benefit from skilled PT to improve functional independence, safety and R sided function. Occupational therapy:  ,  , Assessment: Pt continues to require hands on assist w/ functional mobility, transfers, and ADL completion. Pt is limited d/t behavior, R visual inattention, cognition. Pt would benefit from continued therapy during inpatient stay in order to maximize safety and independence at discharge    Speech therapy:       Inpatient Rehabilitation Course:   Norm Joyner is a 62 y.o. male admitted to inpatient rehabilitation on 1/9/2020 s/p Acute ischemic stroke ([facility] [address] The patient participated in an aggressive multidisciplinary inpatient rehabilitation program involving 3 hours of therapy per day, at least 5 days per week. He was progressing well in therapy when the unit was abruptly closed due to HVAC issues in the facility. He required transition to a SNF for continued therapy. His medical rehab course was significant for below:    Acute left thalamic and temporal lobe infarcts: PT/OT for hemiplegia  - Large right frontal lobe encephalomalacia: question stimulant abuse as coping mechanism for focus and attention. Trialed low-dose ritalin without improvement and discontinued. Prozac for motor recovery     Hypotension: s/p IVF, adjusted coreg. Improved prior to discharge.      Agitation: environmental precautions. Ativan PRN. Scheduled seroquel HS. Much improved since admission.  Continue Ativan PRN and consider weaning in the future.     Dysphagia: SLP     HLD: lipitor 80     HTN: coreg decreased. BP improved.     CAD: ASA, statin     Cardiomyopathy: EF 35%, daily weights, coreg. Continue current regimen.     Amphetamine abuse: education     Tobacco abuse: nicoderm 14, continue at discharge    Discharge Exam:  Gen: No distress, pleasant. Resting in WC  HEENT: Normocephalic, atraumatic  CV: Regular rate and rhythm. No MRG   Resp: No respiratory distress. CTAB   Abd: Soft, nontender nondistended  Ext: No edema   Neuro: Alert, oriented, appropriately interactive. Right sided neglect but improving, RUE 4/5, RLE 2/5, 2/4 MAS    Significant Diagnostics:   Lab Results   Component Value Date    CREATININE 0.9 01/20/2020    BUN 16 01/20/2020     01/20/2020    K 4.0 01/20/2020     01/20/2020    CO2 24 01/20/2020       Lab Results   Component Value Date    WBC 7.3 01/20/2020    HGB 15.1 01/20/2020    HCT 45.5 01/20/2020    MCV 93.5 01/20/2020     01/20/2020       Disposition:  SNF in stable condition. Follow-up:  See after visit summary from hospitalization    Discharge Medications:     Medication List      START taking these medications    LORazepam 0.5 MG tablet  Commonly known as:  ATIVAN  Take 1 tablet by mouth every 6 hours as needed for Anxiety for up to 15 days. nicotine 14 MG/24HR  Commonly known as:  NICODERM CQ  Place 1 patch onto the skin daily  Replaces:  nicotine 21 MG/24HR     polyethylene glycol packet  Commonly known as:  GLYCOLAX  Take 17 g by mouth daily        CHANGE how you take these medications    carvedilol 3.125 MG tablet  Commonly known as:  COREG  Take 1 tablet by mouth 2 times daily (with meals)  What changed:    · medication strength  · See the new instructions. CONTINUE taking these medications    Aspirin Low Dose 81 MG EC tablet  Generic drug:  aspirin  TAKE 1 TABLET BY MOUTH DAILY  Notes to patient:  Use: prevents heart attacks and strokes.   Side effects: bleeding or bruising more easily, stomach upset. atorvastatin 80 MG tablet  Commonly known as:  LIPITOR  Take 1 tablet by mouth daily  Notes to patient:  Atorvastatin (Lipitor)  Use: lower cholesterol; prevent heart attack/stroke  Side effects: headache, muscle pain/weakness     clopidogrel 75 MG tablet  Commonly known as:  PLAVIX  Take 1 tablet by mouth daily  Notes to patient:  Use: prevents closing of stent  Side effects: Bleeding or bruising more easily     FLUoxetine 20 MG capsule  Commonly known as:  PROZAC  Take 1 capsule by mouth daily  Notes to patient:  Use: treatment of depression  Side effects: stomach upset, dry mouth, fatigue, insomnia     pantoprazole 40 MG tablet  Commonly known as:  PROTONIX  TAKE 1 TABLET BY MOUTH DAILY  Notes to patient:  Use: reduces stomach acid, protects the digestive tract  Side effects: headache or diarrhea     QUEtiapine 50 MG tablet  Commonly known as:  SEROQUEL  Take 1 tablet by mouth nightly  Notes to patient:  Use: treatment of depression  Side effects: weight gain, upset stomach, fatigue, agitation        STOP taking these medications    nicotine 21 MG/24HR  Commonly known as:  NICODERM CQ  Replaced by:  nicotine 14 MG/24HR     vitamin B-12 1000 MCG tablet  Commonly known as:  CYANOCOBALAMIN           Where to Get Your Medications      You can get these medications from any pharmacy    Bring a paper prescription for each of these medications  · carvedilol 3.125 MG tablet  · LORazepam 0.5 MG tablet     Information about where to get these medications is not yet available    Ask your nurse or doctor about these medications  · nicotine 14 MG/24HR  · polyethylene glycol packet         I spent over 33 minutes on this discharge encounter between counseling, coordination of care, and medication reconciliation. To comply with [facility] bylaw R.II.4.1:  Discharge order placed in advance to facilitate patient's discharge needs.     Radha Alegre MD    * This document was created using dictation software. While all precautions were taken to ensure accuracy, errors may have occurred. Please disregard any typographical errors.

## 2020-01-21 NOTE — CARE COORDINATION
Spoke with pt's step father and he would like a referral sent to [facility]. Faxed referral and called admissions, left voicemail.  Calli Rodgers, MSW, LSW

## 2020-01-21 NOTE — PLAN OF CARE
Needed: Partial/moderate assistance  Reason if not Attempted: Not attempted due to environmental limitations  CARE Score: 10  Walk 10 Feet  Assistance Needed: Partial/moderate assistance  CARE Score: 3  Discharge Goal: Supervision or touching assistance  Walk 50 Feet with Two Turns  Assistance Needed: Partial/moderate assistance  Reason if not Attempted: Not attempted due to medical condition or safety concerns  CARE Score: 3  Discharge Goal: Supervision or touching assistance  Walk 150 Feet  Assistance Needed: Partial/moderate assistance  Reason if not Attempted: Not attempted due to medical condition or safety concerns  CARE Score: 3  Discharge Goal: Not Applicable  Walking 10 Feet on Uneven Surfaces  Reason if not Attempted: Not attempted due to environmental limitations  CARE Score: 10  Discharge Goal: Supervision or touching assistance  1 Step (Curb)  Assistance Needed: Supervision or touching assistance  CARE Score: 4  Discharge Goal: Partial/moderate assistance  4 Steps  Assistance Needed: Supervision or touching assistance  Reason if not Attempted: Not attempted due to medical condition or safety concerns  CARE Score: 4  Discharge Goal: Partial/moderate assistance  12 Steps  Assistance Needed: Supervision or touching assistance  CARE Score: 4  Discharge Goal: Not Applicable  Picking Up Object  Reason if not Attempted: Not attempted due to medical condition or safety concerns  CARE Score: 88  Discharge Goal: Partial/moderate assistance  Wheelchair Ability  Uses a Wheelchair and/or Scooter?: Yes    SPEECH THERAPY:    Diet Level:DIET DYSPHAGIA SOFT AND BITE-SIZED;  No Drinking Straw    Assessment: Speech Therapy Diagnosis  Cognitive Diagnosis: Pt presents with moderate cognitive deficits characterized by poor pragmatics (severely flat affect and limited eye contact), perseverativeness during conversation, reduced STM and reduced executive functioning for problem-solving tasks  Aphasia Diagnosis: Pt presents with touching assistance  CARE Score: 4  Discharge Goal: Independent  Lower Body Dressing  Assistance Needed: Partial/moderate assistance  CARE Score: 3  Discharge Goal: Independent  Putting On/Taking Off Footwear  Assistance Needed: Partial/moderate assistance  CARE Score: 3  Discharge Goal: Independent    NUTRITION:  Weight: 202 lb 1.6 oz (91.7 kg) / Body mass index is 27.41 kg/m². Diet Order:    Supplements:    Please see nutrition note for details. NURSING:  FIMS:       Linda Days Fall Risk Score:75  Wounds/Incisions/Ulcers: none  Medication Review:Daily with patient  Pain:denies  Consultations/Labs/X-rays: none        Risk for Readmission: 23%  Critical Items: If High Risk, consider the following recommendations:SNF    Patient/Family Education provided by team:    Discharge Plan   Estimated Length of Stay:1 day  Destination: skilled nursing facility  Pass:Yes - Overnight Pass  Services at Discharge: OT, PT, SLP  Equipment at Discharge: Defer to discharge facility   Factors facilitating achievement of predicted outcomes: Family support, Cooperative and Sense of humor  Barriers to the achievement of predicted outcomes: Cognitive deficit, Impulsivity, Limited safety awareness, Limited insight into deficits and Decreased proprioception  Patient Goals: To go home, \"To go home\",     Rehab Team Members in attendance for Team Conference:  Ilan Londono MSW, LSW  Roberto Kovacs RD, DIPTI    [place], [place]. D, Neuropsychologist    Ligia Gutierrez OTR/L    Ny Ragland, PT, DPT    Shayna Limon M.A., Romana Snide, RN Hortensisudeep Wang, [address] [place] [address],     I approve the established interdisciplinary plan of care as documented within the medical record of Carlie Norris.     Isra Christie MD  Electronically signed by Isra Christie MD on 1/21/2020 at 1:44 PM

## 2020-01-22 VITALS
RESPIRATION RATE: 18 BRPM | HEIGHT: 72 IN | TEMPERATURE: 97.9 F | SYSTOLIC BLOOD PRESSURE: 121 MMHG | WEIGHT: 201.5 LBS | HEART RATE: 77 BPM | BODY MASS INDEX: 27.29 KG/M2 | DIASTOLIC BLOOD PRESSURE: 62 MMHG | OXYGEN SATURATION: 96 %

## 2020-01-22 PROCEDURE — 97535 SELF CARE MNGMENT TRAINING: CPT

## 2020-01-22 PROCEDURE — 97130 THER IVNTJ EA ADDL 15 MIN: CPT

## 2020-01-22 PROCEDURE — 6370000000 HC RX 637 (ALT 250 FOR IP): Performed by: PHYSICAL MEDICINE & REHABILITATION

## 2020-01-22 PROCEDURE — 6360000002 HC RX W HCPCS: Performed by: PHYSICAL MEDICINE & REHABILITATION

## 2020-01-22 PROCEDURE — 97129 THER IVNTJ 1ST 15 MIN: CPT

## 2020-01-22 PROCEDURE — 97530 THERAPEUTIC ACTIVITIES: CPT

## 2020-01-22 PROCEDURE — 97116 GAIT TRAINING THERAPY: CPT

## 2020-01-22 RX ADMIN — ENOXAPARIN SODIUM 40 MG: 40 INJECTION SUBCUTANEOUS at 08:14

## 2020-01-22 RX ADMIN — CLOPIDOGREL 75 MG: 75 TABLET, FILM COATED ORAL at 08:15

## 2020-01-22 RX ADMIN — CARVEDILOL 3.12 MG: 3.12 TABLET, FILM COATED ORAL at 08:15

## 2020-01-22 RX ADMIN — FLUOXETINE 20 MG: 20 CAPSULE ORAL at 08:15

## 2020-01-22 RX ADMIN — ATORVASTATIN CALCIUM 80 MG: 80 TABLET, FILM COATED ORAL at 08:15

## 2020-01-22 RX ADMIN — ASPIRIN 81 MG 81 MG: 81 TABLET ORAL at 08:15

## 2020-01-22 RX ADMIN — PANTOPRAZOLE SODIUM 40 MG: 40 TABLET, DELAYED RELEASE ORAL at 07:09

## 2020-01-22 ASSESSMENT — PAIN SCALES - GENERAL: PAINLEVEL_OUTOF10: 8

## 2020-01-22 NOTE — PLAN OF CARE
Problem: Falls - Risk of:   Intervention: Assess risk factors for falls  Note:   Encouraged to left feet to prevent stumbling on floor sergio

## 2020-01-22 NOTE — PROGRESS NOTES
Physical Therapy  Facility/Department: [address]  Daily Treatment Note/Discharge Note  NAME: Anne-Marie Sigala  : 1960  MRN: [id number]    Date of Service: 2020    Discharge Recommendations:  Home with Home health PT, 24 hour supervision or assist   PT Equipment Recommendations  Equipment Needed: Yes  Mobility Devices: Wheelchair;Walker  Walker: Rolling  Wheelchair: Standard  Other: above equipment and R hand splint for RW    Assessment   Body structures, Functions, Activity limitations: Decreased functional mobility ; Decreased strength;Decreased endurance;Decreased ADL status; Decreased safe awareness;Decreased sensation;Decreased cognition;Decreased balance;Decreased posture;Decreased coordination  Assessment: Pt displays improved ability to ambulate further distance with an increase in R LE control. Pt frequently stated his plan to d/c today to SNF but remains agreeable to therapy and willing to participate throughout our session. Pt's ability to ascend/descend stairs has improved and the pt needs only CGA and VCs to complete a flight. Throughout stay, pt's transfers have improved in terms of set up and assistance level, however pt required increased assistance and VCs this session multiple times due to impulsivity and lack of set up before attempts. Pt continues to display deficits in R side and would continue to benefit from skilled PT to improve functional mobility and optimize independence. Treatment Diagnosis: R inattention, weakness, impaired sensation secondary to CVA  Prognosis: Good  Decision Making: Medium Complexity  PT Education: Goals;PT Role;Plan of Care;General Safety;Transfer Training;Gait Training;Functional Mobility Training  Patient Education: Pt educated on the importance of slowing down during transfers and seting up properly before attempts.    REQUIRES PT FOLLOW UP: Yes  Activity Tolerance  Activity Tolerance: Patient Tolerated treatment well;Patient limited by endurance  Activity

## 2020-01-22 NOTE — DISCHARGE SUMMARY
MG/24HR     vitamin B-12 1000 MCG tablet  Commonly known as:  CYANOCOBALAMIN           Where to Get Your Medications      You can get these medications from any pharmacy    Bring a paper prescription for each of these medications  · carvedilol 3.125 MG tablet  · LORazepam 0.5 MG tablet     Information about where to get these medications is not yet available    Ask your nurse or doctor about these medications  · nicotine 14 MG/24HR  · polyethylene glycol packet         I spent over 33 minutes on this discharge encounter between counseling, coordination of care, and medication reconciliation. To comply with [facility] bylaw R.II.4.1:  Discharge order placed in advance to facilitate patient's discharge needs. Meme Weiss MD    * This document was created using dictation software. While all precautions were taken to ensure accuracy, errors may have occurred. Please disregard any typographical errors.

## 2020-01-22 NOTE — PROGRESS NOTES
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 He was alert and cooperative. Strong preference noted to L side. Pt seen this date for therapy due to not discharged yesterday as initially planned. Pt resting in bed upon SLP entry but agreeable to sitting upright. Cooperative and participated in session. Pt making progress towards dysphagia/ cognitive goals. Insight is emerging, however, continues to require education for carryover. Despite progress, suspect pt will still require initial 24 hour supervision at d/c for higher level executive function and problem solving tasks. Pt to be d/c'd to SNF for further therapy. Continue to target dysphagia/ cognitive goals. Objective:  Goals      The patient will tolerate recommended diet without observed clinical signs of aspiration. Goal not targeted this session. Goal not targeted this session. GOAL MET (for current soft and bite sized diet)    The patient will recall and perform compensatory strategies, with no cues. Pt recalled need to take small bites and chew thoroughly. Given verbal cues, pt also able to recall that he should take one bite/sip at a time, alternate liquids/solids, and use lingual sweep to check for pocketing in R side. Emerging, continue    Pt will improve executive function for multifactor task completion via graded tasks to 80% accuracy, min cues   Functional reading (product label):  -answered 2/7 questions independently (29%)  -increased to 4/7 (57%) given mod cues  -additional cueing required to increase accuracy and for pt to locate target information in product label  -pt impulsive during task, reading the first piece of information he located rather than looking for target information    Word problems (money): pt answered 2/2 questions independently Word problems (math):  -pt answered 3/8 questions independently (38%)  -increased to 7/8 (88%) given min to mod cues  -benefited from repetitions of key information and using pen/paper to write down math problems  -somewhat impulsive while completing task  -required extended time to correct errors  -at end of task, able to identify that he did \"not too good\"  -pt initially stating he performed \"the same\" as he would have prior to CVA; however, after discussion, pt able to identify these questions probably would have been easier prior to CVA   Goal met inconsistently, continue to target to ensure accuracy across sessions    Pt will participate in ongoing cognitive-linguitic testing with additional goals to be established as necessary. GOAL MET GOAL MET GOAL MET    Other areas targeted: Word deduction:  -given 3 related words, pt able to determine word being described  In 8/10 opportunities (80%)  -increased to 100% given min semantic cues Orientation: pt utilized white board in room to provide date (which was not updated from yesterday)--able to problem solve to add one day to SHELLY/date. However, pt did indicate year as 2000. Education:   Education provided re: d/c transfer and continued POC. Pt v/u. Education provided re: d/c transfer and continued POC. Pt v/u.      Safety Devices: [x] Call light within reach  [x] Chair alarm activated  [] Bed alarm activated  [x] Other: camera in place for safety. [x] Call light within reach  [] Chair alarm activated  [x] Bed alarm activated  [x] Other: camera in place for safety. Assessment:   Speech Therapy Diagnosis  Cognitive Diagnosis: Pt presents with moderate cognitive deficits characterized by poor pragmatics (severely flat affect and limited eye contact), perseverativeness during conversation, reduced STM and reduced executive functioning for problem-solving tasks  Aphasia Diagnosis: Pt presents with moderate receptive aphasia deficits characterized by difficulty with complex commands/questions, suspect cognitive deficits contribute to difficulty  Speech Diagnosis: Mild dysathria noted in connected speech/conversation    Current Diet Order: DIET DYSPHAGIA SOFT AND BITE-SIZED; No Drinking Straw   Recommended Form of Meds: Allow Meds whole with water   Compensatory Swallowing Strategies:  Alternate solids and liquids, Small bites/sips, Remain upright for 30-45 minutes after meals, Upright as possible for all oral intake     Plan:     Frequency:  5days/week   60 minutes/day  Discharge Recommendations:   Barriers: Cognitive deficit, Impulsivity, Limited safety awareness, Limited insight into deficits, Behaviors, Noncompliance with meds/ substance abuse, and Unrealistic expectations    Discharge Recommendations:    [] Home independently   [] Home with assistance   [x]  24 hour supervision vs.      [x] SNF   [] Other: TBD     Continued SLP Treatment:    [x] Yes   [] No   [] TBD based on progress while on ARU   [] Vital Stim indicated   [] Other:     Estimated discharge date: 1/22/20     Type of Total Treatment Minutes   Session 1   Session 2   Time In 0830 0837   Time Out 0900 0915   Timed Code Minutes  30 38   Individual Treatment Minutes  30 38   Co-Treatment Minutes      Group Treatment Minutes      Concurrent Treatment Minutes        TOTAL DAILY MINUTES:  76    Electronically Signed by     KARIN Carmen Ra Pathologist  1/22/2020 10:35 AM

## 2020-01-22 NOTE — CARE COORDINATION
Discharge Plan:      Patient discharged TO FACILITY: [facility]- [phone]  FAX: [phone]  SW/DC Planner faxed, [address]   Narcotic Prescriptions faxed were: not applicable  RN:  Luis castrejon will call report      Medical Transport with: [facility]  [phone]   time: 6:00 pm  Family advised of discharge?: Yes  HENS Submitted?:  Yes  All discharge needs met per case management.   CHANTEL Francis, [address]  [facility]  [phone]

## 2020-01-22 NOTE — PROGRESS NOTES
task. Pt requesting to get into bed at end of session, bed alarm on, needs within reach. Plan   Plan  Times per week: D/C to SNF  Times per day: Daily  Current Treatment Recommendations: Strengthening, Balance Training, ROM, Functional Mobility Training, Neuromuscular Re-education, Safety Education & Training, Self-Care / ADL, Patient/Caregiver Education & Training, Cognitive/Perceptual Training       Goals  Short term goals  Time Frame for Short term goals: 7-10 days   Short term goal 1: Bathing w/ Min A-Goal met 1/15/2020  Short term goal 2: UB dressing w/ Min A-Goal met 1/15/2020  Short term goal 3: LB dressing w/ Min A-Goal met 1/21/2020   Short term goal 4: Functional transfer for ADL completion w/ Min A-Goal met 1/15/2020  Short term goal 5: Toileting w/ Min A-Goal met 1/21/2020   Long term goals  Time Frame for Long term goals : 3 weeks   Long term goal 1: Bathing w/ Mod I-Goal not met (CGA)  Long term goal 2: UB dressing w/ Mod I-Goal not met (Set up)  Long term goal 3: LB dressing w/ Mod I-Goal not met (Min A)  Long term goal 4: Functional transfer for ADL completion w/ Mod I-Goal not met (CGA)  Long term goal 5: Toileting w/ Mod I-Min A  Patient Goals   Patient goals :  \"To go home\"       Therapy Time   Individual Concurrent Group Co-treatment   Time In 1100,1435         Time Out 1138, 1457         Minutes 38, 22               Timed Code Treatment Minutes:  38 + 22   Total Treatment Minutes:  60 minutes       Kaitlyn Conner, OT   Kaitlyn Conner OTR/L, DOUGLAS #[id number]

## 2020-01-30 ENCOUNTER — APPOINTMENT (OUTPATIENT)
Dept: GENERAL RADIOLOGY | Age: 60
End: 2020-01-30
Payer: COMMERCIAL

## 2020-01-30 ENCOUNTER — HOSPITAL ENCOUNTER (EMERGENCY)
Age: 60
Discharge: HOME | End: 2020-01-30
Attending: EMERGENCY MEDICINE
Payer: COMMERCIAL

## 2020-01-30 ENCOUNTER — APPOINTMENT (OUTPATIENT)
Dept: CT IMAGING | Age: 60
End: 2020-01-30
Payer: COMMERCIAL

## 2020-01-30 VITALS
SYSTOLIC BLOOD PRESSURE: 118 MMHG | HEART RATE: 76 BPM | RESPIRATION RATE: 18 BRPM | TEMPERATURE: 98.6 F | DIASTOLIC BLOOD PRESSURE: 95 MMHG | OXYGEN SATURATION: 95 %

## 2020-01-30 LAB
AMORPHOUS: ABNORMAL /HPF
ANION GAP SERPL CALCULATED.3IONS-SCNC: 10 MMOL/L (ref 3–16)
BACTERIA: ABNORMAL /HPF
BASOPHILS ABSOLUTE: 0 K/UL (ref 0–0.2)
BASOPHILS RELATIVE PERCENT: 0.6 %
BILIRUBIN URINE: NEGATIVE
BLOOD, URINE: NEGATIVE
BUN BLDV-MCNC: 17 MG/DL (ref 7–20)
CALCIUM SERPL-MCNC: 9.2 MG/DL (ref 8.3–10.6)
CHLORIDE BLD-SCNC: 102 MMOL/L (ref 99–110)
CLARITY: CLEAR
CO2: 27 MMOL/L (ref 21–32)
COLOR: YELLOW
CREAT SERPL-MCNC: 1 MG/DL (ref 0.8–1.3)
EOSINOPHILS ABSOLUTE: 0 K/UL (ref 0–0.6)
EOSINOPHILS RELATIVE PERCENT: 0.4 %
EPITHELIAL CELLS, UA: ABNORMAL /HPF
GFR AFRICAN AMERICAN: >60
GFR NON-AFRICAN AMERICAN: >60
GLUCOSE BLD-MCNC: 103 MG/DL (ref 70–99)
GLUCOSE URINE: NEGATIVE MG/DL
HCT VFR BLD CALC: 42.4 % (ref 40.5–52.5)
HEMOGLOBIN: 14.1 G/DL (ref 13.5–17.5)
INR BLD: 1.05 (ref 0.86–1.14)
KETONES, URINE: NEGATIVE MG/DL
LEUKOCYTE ESTERASE, URINE: NEGATIVE
LYMPHOCYTES ABSOLUTE: 0.8 K/UL (ref 1–5.1)
LYMPHOCYTES RELATIVE PERCENT: 13.1 %
MCH RBC QN AUTO: 31.3 PG (ref 26–34)
MCHC RBC AUTO-ENTMCNC: 33.3 G/DL (ref 31–36)
MCV RBC AUTO: 94.1 FL (ref 80–100)
MICROSCOPIC EXAMINATION: YES
MONOCYTES ABSOLUTE: 0.7 K/UL (ref 0–1.3)
MONOCYTES RELATIVE PERCENT: 12.5 %
MUCUS: ABNORMAL /LPF
NEUTROPHILS ABSOLUTE: 4.3 K/UL (ref 1.7–7.7)
NEUTROPHILS RELATIVE PERCENT: 73.4 %
NITRITE, URINE: NEGATIVE
PDW BLD-RTO: 13.5 % (ref 12.4–15.4)
PH UA: 6.5 (ref 5–8)
PLATELET # BLD: 166 K/UL (ref 135–450)
PMV BLD AUTO: 9.4 FL (ref 5–10.5)
POTASSIUM REFLEX MAGNESIUM: 4.1 MMOL/L (ref 3.5–5.1)
PROTEIN UA: ABNORMAL MG/DL
PROTHROMBIN TIME: 12.2 SEC (ref 10–13.2)
RBC # BLD: 4.51 M/UL (ref 4.2–5.9)
RBC UA: ABNORMAL /HPF (ref 0–2)
SODIUM BLD-SCNC: 139 MMOL/L (ref 136–145)
SPECIFIC GRAVITY UA: 1.02 (ref 1–1.03)
TROPONIN: <0.01 NG/ML
URINE TYPE: ABNORMAL
UROBILINOGEN, URINE: 2 E.U./DL
WBC # BLD: 5.8 K/UL (ref 4–11)
WBC UA: ABNORMAL /HPF (ref 0–5)

## 2020-01-30 PROCEDURE — 73502 X-RAY EXAM HIP UNI 2-3 VIEWS: CPT

## 2020-01-30 PROCEDURE — 36415 COLL VENOUS BLD VENIPUNCTURE: CPT

## 2020-01-30 PROCEDURE — 80048 BASIC METABOLIC PNL TOTAL CA: CPT

## 2020-01-30 PROCEDURE — 70450 CT HEAD/BRAIN W/O DYE: CPT

## 2020-01-30 PROCEDURE — 85025 COMPLETE CBC W/AUTO DIFF WBC: CPT

## 2020-01-30 PROCEDURE — 85610 PROTHROMBIN TIME: CPT

## 2020-01-30 PROCEDURE — 84484 ASSAY OF TROPONIN QUANT: CPT

## 2020-01-30 PROCEDURE — 72125 CT NECK SPINE W/O DYE: CPT

## 2020-01-30 PROCEDURE — 99284 EMERGENCY DEPT VISIT MOD MDM: CPT

## 2020-01-30 PROCEDURE — 6360000004 HC RX CONTRAST MEDICATION: Performed by: EMERGENCY MEDICINE

## 2020-01-30 PROCEDURE — 81001 URINALYSIS AUTO W/SCOPE: CPT

## 2020-01-30 PROCEDURE — 71260 CT THORAX DX C+: CPT

## 2020-01-30 PROCEDURE — 73070 X-RAY EXAM OF ELBOW: CPT

## 2020-01-30 RX ADMIN — IOPAMIDOL 80 ML: 755 INJECTION, SOLUTION INTRAVENOUS at 12:08

## 2020-01-30 ASSESSMENT — ENCOUNTER SYMPTOMS
BLOOD IN STOOL: 0
ABDOMINAL PAIN: 1
BACK PAIN: 0
SHORTNESS OF BREATH: 0
CHEST TIGHTNESS: 0

## 2020-01-30 NOTE — ED NOTES
1200 warm moist compress applied to left upper arm dye infiltration. Patient denies complaint . Domenica Duran  Arm elevated     Omar Forrest RN  01/30/20 [id number]

## 2020-01-30 NOTE — ED PROVIDER NOTES
History     He has a past medical history of CAD (coronary artery disease), Chronic kidney disease, Collapsed lung, GERD (gastroesophageal reflux disease), Hematuria, Hyperlipidemia, Hypertension, ICD (implantable cardioverter-defibrillator) in place, Mural thrombus of left ventricle, and Tortuous aorta ([address] He has a past surgical history that includes Colon surgery; Cholecystectomy, open; Arm Debridement (10-26-10); Cardiac surgery; Colonoscopy (07/18/2016); pacemaker placement (Left, 2013); Upper gastrointestinal endoscopy (05/01/2017); Upper gastrointestinal endoscopy (06/27/2017); and Colonoscopy (11/20/2019). His family history includes Alcohol Abuse in his father; Asthma in his mother; Diabetes in his brother; Heart Attack in his paternal uncle. He reports that he has been smoking cigarettes. He has a 15.00 pack-year smoking history. He has never used smokeless tobacco. He reports current drug use. Drug: Cocaine. He reports that he does not drink alcohol. Medications     Current Discharge Medication List      CONTINUE these medications which have NOT CHANGED    Details   LORazepam (ATIVAN) 0.5 MG tablet Take 1 tablet by mouth every 6 hours as needed for Anxiety for up to 15 days.   Qty: 10 tablet, Refills: 0    Associated Diagnoses: Acute ischemic stroke (HCC)      carvedilol (COREG) 3.125 MG tablet Take 1 tablet by mouth 2 times daily (with meals)  Qty: 60 tablet, Refills: 3      polyethylene glycol (GLYCOLAX) packet Take 17 g by mouth daily  Qty: 527 g, Refills: 1      nicotine (NICODERM CQ) 14 MG/24HR Place 1 patch onto the skin daily  Qty: 30 patch, Refills: 3      FLUoxetine (PROZAC) 20 MG capsule Take 1 capsule by mouth daily  Qty: 30 capsule, Refills: 0      QUEtiapine (SEROQUEL) 50 MG tablet Take 1 tablet by mouth nightly  Qty: 30 tablet, Refills: 0      clopidogrel (PLAVIX) 75 MG tablet Take 1 tablet by mouth daily  Qty: 30 tablet, Refills: 0      pantoprazole (PROTONIX) 40 MG tablet TAKE 1 TABLET BY MOUTH DAILY  Qty: 60 tablet, Refills: 0      ASPIRIN LOW DOSE 81 MG EC tablet TAKE 1 TABLET BY MOUTH DAILY  Qty: 30 tablet, Refills: 0      atorvastatin (LIPITOR) 80 MG tablet Take 1 tablet by mouth daily  Qty: 90 tablet, Refills: 3    Associated Diagnoses: Coronary artery disease involving native heart with unstable angina pectoris, unspecified vessel or lesion type ([facility] [address] Ischemic cardiomyopathy; Essential hypertension; Hypercholesteremia; Tobacco abuse             Allergies     He has No Known Allergies. Physical Exam     INITIAL VITALS: BP: 102/67, Temp: 98.7 °F (37.1 °C), Pulse: 84, Resp: 20, SpO2: 95 %  Physical Exam  Vitals signs and nursing note reviewed. Constitutional:       General: He is not in acute distress. Appearance: He is well-developed. HENT:      Head: Normocephalic and atraumatic. Right Ear: External ear normal.      Left Ear: External ear normal.      Nose: Nose normal.      Mouth/Throat:      Mouth: Mucous membranes are moist.   Eyes:      General:         Right eye: No discharge. Left eye: No discharge. Extraocular Movements: Extraocular movements intact. Conjunctiva/sclera: Conjunctivae normal.      Pupils: Pupils are equal, round, and reactive to light. Neck:      Musculoskeletal: Normal range of motion and neck supple. Cardiovascular:      Rate and Rhythm: Normal rate and regular rhythm. Pulses: Normal pulses. Heart sounds: Normal heart sounds. No murmur. Pulmonary:      Effort: Pulmonary effort is normal.      Breath sounds: Normal breath sounds. No stridor. No wheezing. Comments: Bilateral equal breath sounds  Abdominal:      General: Bowel sounds are normal. There is no distension. Palpations: Abdomen is soft. Tenderness: There is abdominal tenderness. There is no guarding or rebound. Comments:  On examination he has tenderness to palpation in the right upper quadrant with chronic appearing ecchymosis to the fracture or static subluxation of the cervical spine. CT Head WO Contrast   Final Result      Multiple old infarcts      No acute hemorrhage.           LABS:   Results for orders placed or performed during the hospital encounter of 01/30/20   CBC auto differential   Result Value Ref Range    WBC 5.8 4.0 - 11.0 K/uL    RBC 4.51 4.20 - 5.90 M/uL    Hemoglobin 14.1 13.5 - 17.5 g/dL    Hematocrit 42.4 40.5 - 52.5 %    MCV 94.1 80.0 - 100.0 fL    MCH 31.3 26.0 - 34.0 pg    MCHC 33.3 31.0 - 36.0 g/dL    RDW 13.5 12.4 - 15.4 %    Platelets 052 673 - 468 K/uL    MPV 9.4 5.0 - 10.5 fL    Neutrophils % 73.4 %    Lymphocytes % 13.1 %    Monocytes % 12.5 %    Eosinophils % 0.4 %    Basophils % 0.6 %    Neutrophils Absolute 4.3 1.7 - 7.7 K/uL    Lymphocytes Absolute 0.8 (L) 1.0 - 5.1 K/uL    Monocytes Absolute 0.7 0.0 - 1.3 K/uL    Eosinophils Absolute 0.0 0.0 - 0.6 K/uL    Basophils Absolute 0.0 0.0 - 0.2 K/uL   Basic Metabolic Panel w/ Reflex to MG   Result Value Ref Range    Sodium 139 136 - 145 mmol/L    Potassium reflex Magnesium 4.1 3.5 - 5.1 mmol/L    Chloride 102 99 - 110 mmol/L    CO2 27 21 - 32 mmol/L    Anion Gap 10 3 - 16    Glucose 103 (H) 70 - 99 mg/dL    BUN 17 7 - 20 mg/dL    CREATININE 1.0 0.8 - 1.3 mg/dL    GFR Non-African American >60 >60    GFR African American >60 >60    Calcium 9.2 8.3 - 10.6 mg/dL   Troponin   Result Value Ref Range    Troponin <0.01 <0.01 ng/mL   Protime-INR   Result Value Ref Range    Protime 12.2 10.0 - 13.2 sec    INR 1.05 0.86 - 1.14   Urinalysis   Result Value Ref Range    Color, UA Yellow Straw/Yellow    Clarity, UA Clear Clear    Glucose, Ur Negative Negative mg/dL    Bilirubin Urine Negative Negative    Ketones, Urine Negative Negative mg/dL    Specific Gravity, UA 1.025 1.005 - 1.030    Blood, Urine Negative Negative    pH, UA 6.5 5.0 - 8.0    Protein, UA TRACE (A) Negative mg/dL    Urobilinogen, Urine 2.0 (A) <2.0 E.U./dL    Nitrite, Urine Negative Negative    Leukocyte Esterase, Urine Negative Negative    Microscopic Examination YES     Urine Type NotGiven    Microscopic Urinalysis   Result Value Ref Range    Mucus, UA Rare (A) /LPF    WBC, UA 0-2 0 - 5 /HPF    RBC, UA 0-2 0 - 2 /HPF    Epi Cells 0-2 /HPF    Bacteria, UA Rare (A) /HPF    Amorphous, UA 1+ (A) /HPF           RECENT VITALS:  BP: 122/84, Temp: 98.7 °F (37.1 °C), Pulse: 72, Resp: 18, SpO2: 97 %     Procedures         ED Course     Nursing Notes, Past Medical Hx,Past Surgical Hx, Social Hx, Allergies, and Family Hx were reviewed. The patient was given the following medications:  Orders Placed This Encounter   Medications    iopamidol (ISOVUE-370) 76 % injection 80 mL       CONSULTS:  None    MEDICAL DECISION MAKING / ASSESSMENT / Emelyn Edita is a 62 y.o. male who presented to the emergency department with right-sided pain after a mechanical fall. He had an IV established with labs drawn. Labs are unremarkable. Urinalysis shows no evidence of infection. He had an x-ray of the right hip and pelvis that shows no acute bony pathology with moderate degenerative changes. He had a CT of the head that shows multiple old infarcts with no acute hemorrhage. CT of the cervical spine shows no evidence of fracture or static subluxation of the cervical spine. We ordered a CT of the abdomen and pelvis as well as the chest given his fall and areas of ecchymosis. During the test he had contrast extravasation into the left arm. We then ordered an x-ray of the left elbow that shows high density IV contrast material throughout the soft tissues. On examination he has intact sensation of that extremity, compartments are soft. He denies pain with palpation of the arm. He is able to flex and extend his digits, wrist and elbow without difficulty or pain.   CT of the chest shows no acute traumatic abnormality and CT of the pelvis shows infiltration of cutaneous tissues in the right flank corresponding to the ecchymosis noted on exam.  No underlying hematoma. No traumatic solid organ injury of the abdomen or pelvis. He will be discharged back to the facility and can take Tylenol as needed for pain. He is to use warm compresses to his left arm and I wrote down strict instructions for him to return should he develop numbness or paresthesias of that extremity, tightness to palpation, severe pain or other concerns    This patient was also evaluated by the attending physician. All care plans were discussed and agreed upon. Clinical Impression     1. Fall, initial encounter    2.  Multiple contusions        Disposition     PATIENT REFERRED TO:  Karin Gaucher, MD  [address] [place] [address], [address] [place] [address] Road  [phone]    Call   for follow up      DISCHARGE MEDICATIONS:  Current Discharge Medication List          [address] [place] [address]  01/30/20 6014

## 2020-02-10 NOTE — TELEPHONE ENCOUNTER
Spoke to Chantel Miguel at [address] [place] [address] - I explained that the pt hasn't been seen by our office since 11/2018 he was seen in the hospital and this was a medication that was changed to 3.125mg bid - I explained that it was sent to the pharmacy and has 3 refills attached - its enough to hold him until being seen in our office - pt needs to follow up with us to continue getting this medication     Chantel Miguel explained that the pt was still taking the 12.125mg bid so she will get with him and explain that he needs to take the 3.125 as instructed 01/2020 at the hospital and to call the pharmacy to get the new refill - she is also letting him know he needs to be seen in our office as well as a HS follow up

## 2020-02-10 NOTE — TELEPHONE ENCOUNTER
Am [facility] asking for direction clarification for pt's carvedilol.  Please call to advise

## 2020-02-27 ENCOUNTER — NURSE ONLY (OUTPATIENT)
Dept: CARDIOLOGY CLINIC | Age: 60
End: 2020-02-27
Payer: COMMERCIAL

## 2020-02-27 ENCOUNTER — OFFICE VISIT (OUTPATIENT)
Dept: CARDIOLOGY CLINIC | Age: 60
End: 2020-02-27
Payer: COMMERCIAL

## 2020-02-27 VITALS
SYSTOLIC BLOOD PRESSURE: 99 MMHG | HEART RATE: 71 BPM | HEIGHT: 72 IN | BODY MASS INDEX: 27.77 KG/M2 | DIASTOLIC BLOOD PRESSURE: 61 MMHG | WEIGHT: 205 LBS

## 2020-02-27 PROCEDURE — 93283 PRGRMG EVAL IMPLANTABLE DFB: CPT | Performed by: INTERNAL MEDICINE

## 2020-02-27 PROCEDURE — 99214 OFFICE O/P EST MOD 30 MIN: CPT | Performed by: NURSE PRACTITIONER

## 2020-02-27 RX ORDER — GABAPENTIN 300 MG/1
300 CAPSULE ORAL 2 TIMES DAILY
COMMUNITY
Start: 2020-02-24

## 2020-02-27 RX ORDER — TRAZODONE HYDROCHLORIDE 50 MG/1
50 TABLET ORAL NIGHTLY
COMMUNITY
Start: 2020-02-07 | End: 2020-07-09

## 2020-02-27 NOTE — PROGRESS NOTES
[facility]     Outpatient Follow Up Note    CHIEF COMPLAINT / HPI:  Follow Up secondary to CAD/ HTN/ cardiomyopathy, and hyperlipidemia     Hospital Course: The patient is a 62year old white male with pmh significant for CAD, cardiomyopathy,hypercholesterolemia, meth and cocaine abuse and non compliance with medicine. The patient was found down at home by his step dad on the floor with right sided paralysis, unknown the timing of when he was last at his baseline, was not candidate for TPA. His CT was consistent with CVA. The patient was admitted for further evaluation and management. Neurology was consulted. Pt/Ot and speech therapy consulted. Social Work was consulted for discharge planning. Acute rehab was recommended by therapy. The patient reported he was depressed. He was started on prozac. The patient suffered from some agitation and irritability and was started on lorazepam im and prn xanax. He was also started on seroquel. The patient improved and did get accepted into acute rehab and he was discharge in stable condition. Luis Fernando Barker is 62 y.o. male who presents today for a routine follow up related to the above mentioned issues. Subjective:   Since the time of discharge, the patient admits their symptoms have improved   Patient has an history of CAD/MI/CABG/Stents/Mural thrombus / s/p with stents in 2015 and angio in 2016 with out interventions. On 11/14/16 patient had a LHC which showed non- obstructive CAD with patent stents. At today's office visit patient is doing well. He denies any chest pain, palpitations, SOB, dizziness, or edema. He is using a walker due to recent CVA and right side paralysis. His father is at today's OV. With regard to medication therapy the patient has been compliant with prescribed regimen. They have tolerated therapy to date.      Past Medical History:   Diagnosis Date    CAD (coronary artery disease)     MI CABG x5    Chronic kidney disease     Collapsed lung     Bilat    GERD (gastroesophageal reflux disease)     Hematuria     Hyperlipidemia     Hypertension     ICD (implantable cardioverter-defibrillator) in place     Mural thrombus of left ventricle     Tortuous aorta (HCC)      Social History:    Social History     Tobacco Use   Smoking Status Current Every Day Smoker    Packs/day: 0.50    Years: 30.00    Pack years: 15.00    Types: Cigarettes   Smokeless Tobacco Never Used   Tobacco Comment    trying to quit/currently < 1PPD     Current Medications:  Current Outpatient Medications   Medication Sig Dispense Refill    traZODone (DESYREL) 50 MG tablet Take 50 mg by mouth nightly      gabapentin (NEURONTIN) 300 MG capsule Take 300 mg by mouth 2 times daily.  carvedilol (COREG) 3.125 MG tablet Take 1 tablet by mouth 2 times daily (with meals) 60 tablet 3    clopidogrel (PLAVIX) 75 MG tablet Take 1 tablet by mouth daily 30 tablet 0    ASPIRIN LOW DOSE 81 MG EC tablet TAKE 1 TABLET BY MOUTH DAILY 30 tablet 0    atorvastatin (LIPITOR) 80 MG tablet Take 1 tablet by mouth daily 90 tablet 3    nicotine (NICODERM CQ) 14 MG/24HR Place 1 patch onto the skin daily (Patient not taking: Reported on 2/27/2020) 30 patch 3    FLUoxetine (PROZAC) 20 MG capsule Take 1 capsule by mouth daily (Patient not taking: Reported on 2/27/2020) 30 capsule 0    QUEtiapine (SEROQUEL) 50 MG tablet Take 1 tablet by mouth nightly (Patient not taking: Reported on 2/27/2020) 30 tablet 0    pantoprazole (PROTONIX) 40 MG tablet TAKE 1 TABLET BY MOUTH DAILY (Patient not taking: Reported on 2/27/2020) 60 tablet 0     No current facility-administered medications for this visit. REVIEW OF SYSTEMS:   CONSTITUTIONAL: No major weight gain or loss, fatigue, weakness, night sweats or fever. There's been no change in energy level, sleep pattern, or activity level. HEENT: No new vision difficulties or ringing in the ears.   RESPIRATORY: No new SOB, PND, orthopnea or cough. CARDIOVASCULAR: See HPI  GI: No nausea, vomiting, diarrhea, constipation, abdominal pain or changes in bowel habits. : No urinary frequency, urgency, incontinence hematuria or dysuria. SKIN: No cyanosis or skin lesions. MUSCULOSKELETAL: No new muscle or joint pain. NEUROLOGICAL: No syncope or TIA-like symptoms. PSYCHIATRIC: No anxiety, pain, insomnia or depression    Objective:   PHYSICAL EXAM:        VITALS:    Vitals:    02/27/20 1114   BP: 99/61   Pulse: 71           CONSTITUTIONAL: Cooperative, no apparent distress, and appears well nourished / developed  NEUROLOGIC:  Awake and orientated to person, place and time. PSYCH: Calm affect. SKIN: Warm and dry. HEENT: Sclera non-icteric, normocephalic, neck supple, no elevation of JVP, normal carotid pulses with no bruits and thyroid normal size. LUNGS:  No increased work of breathing and clear to auscultation, no crackles or wheezing. CARDIOVASCULAR:  Regular rate and rhythm with no murmurs, gallops, rubs, or abnormal heart sounds, normal PMI. The apical impulses not displaced. Heart tones are crisp and normal. Cervical veins are not engorged                 JVP less than 8 cm H2O                                                                              The carotid upstroke is normal in amplitude and contour without delay or bruit    ABDOMEN:  Normal bowel sounds, non-distended and non-tender to palpation   EXT: No edema, no calf tenderness. Pulses are present bilaterally.  Right side paralysis     DATA:    Lab Results   Component Value Date    ALT 14 01/03/2020    AST 16 01/03/2020    ALKPHOS 82 01/03/2020    BILITOT 0.7 01/03/2020     Lab Results   Component Value Date    CREATININE 1.0 01/30/2020    BUN 17 01/30/2020     01/30/2020    K 4.1 01/30/2020     01/30/2020    CO2 27 01/30/2020     Lab Results   Component Value Date    TSH 1.00 10/14/2019     Lab Results   Component Value Date    WBC 5.8 01/30/2020 HGB 14.1 2020    HCT 42.4 2020    MCV 94.1 2020     2020     No components found for: CHLPL  Lab Results   Component Value Date    TRIG 170 (H) 2020    TRIG 204 (H) 10/14/2019    TRIG 100 2019     Lab Results   Component Value Date    HDL 39 (L) 2020    HDL 43 10/14/2019    HDL 47 2019     Lab Results   Component Value Date    LDLCALC 152 (H) 2020    LDLCALC 75 10/14/2019    LDLCALC 106 (H) 2019     Lab Results   Component Value Date    LABVLDL 34 2020    LABVLDL 41 10/14/2019    LABVLDL 20 2019     Radiology Review:  Pertinent images / reports were reviewed as a part of this visit and reveals the followin/24/18: Myoview stress test :    Conclusions    Summary  There are moderate-large fixed anterior and fixed inferior lateral defects  of severe intensity c/w scar. There is no ischemia. There is severe LV dysfunction with EF=28%. This is similar to study in 2013. Procedure:   [facility]  iFR RCA - 0.96        Coronary Findings   Vessel Ostial Proximal Mid Distal Special   LM 0% 0% 0% 0%     LAD 0% 0% 0% 0% D1-100% with L-L karey   RI   50% sup br         Cx 0% 0% 0% 0% Widely patent stent   RCA 0% 0% 40%, 60% 0% iFR negative         Last ECHO: 1/3/2020   Summary   -Left ventricular cavity size is mildly dilated. -There is mild asymetric left ventricular hypertrophy.   -Overall left ventricular systolic function appears moderately reduced with   an ejection fraction in the 35% range.   -There is severe hypokinesis of the basal and inferoseptal walls. -Grade I diastolic dysfunction with normal LV filling pressures. E/e\"=9.2.   -Trivial mitral regurgitation.   -Aortic valve appears sclerotic but opens adequately.   -The left atrium is mildly dilated.   -A bubble study was performed and fails to show evidence of shunting.   -Pacer / ICD wire is visualized in the right heart.       ECHO:   Left Ventricle   Normal left results. All questions and concerns were addressed to the patient/family. Alternatives to  treatment were discussed. The patient  Currently is smoking. The risks related to smoking were reviewed with the patient. Recommend maintaining a smoke-free lifestyle. Products available for smoking cessation were discussed. Thank you for allowing to us to participate in the care of [address] [place] [address]

## 2020-02-28 ENCOUNTER — TELEPHONE (OUTPATIENT)
Dept: CARDIOLOGY CLINIC | Age: 60
End: 2020-02-28

## 2020-03-23 ENCOUNTER — HOSPITAL ENCOUNTER (OUTPATIENT)
Dept: PHYSICAL THERAPY | Age: 60
Setting detail: THERAPIES SERIES
Discharge: HOME | End: 2020-03-23
Payer: COMMERCIAL

## 2020-05-27 ENCOUNTER — NURSE ONLY (OUTPATIENT)
Dept: CARDIOLOGY CLINIC | Age: 60
End: 2020-05-27
Payer: COMMERCIAL

## 2020-05-27 ENCOUNTER — TELEPHONE (OUTPATIENT)
Dept: CARDIOLOGY CLINIC | Age: 60
End: 2020-05-27

## 2020-05-27 PROCEDURE — 93283 PRGRMG EVAL IMPLANTABLE DFB: CPT | Performed by: INTERNAL MEDICINE

## 2020-06-01 ENCOUNTER — HOSPITAL ENCOUNTER (OUTPATIENT)
Dept: PHYSICAL THERAPY | Age: 60
Setting detail: THERAPIES SERIES
Discharge: HOME | End: 2020-06-01
Payer: COMMERCIAL

## 2020-06-01 PROCEDURE — 97112 NEUROMUSCULAR REEDUCATION: CPT

## 2020-06-01 PROCEDURE — 97162 PT EVAL MOD COMPLEX 30 MIN: CPT

## 2020-06-01 PROCEDURE — 97110 THERAPEUTIC EXERCISES: CPT

## 2020-06-01 NOTE — PLAN OF CARE
Ashwini, [facility], [address]  Phone: [phone]   Fax: [phone]     Physical Therapy Certification    Dear Referring Practitioner: Domo Rodriguez MD,    We had the pleasure of evaluating the following patient for physical therapy services at [address] [place] [address]. A summary of our findings can be found in the initial assessment below. This includes our plan of care. If you have any questions or concerns regarding these findings, please do not hesitate to contact me at the office phone number checked above. Thank you for the referral.       Physician Signature:_______________________________Date:__________________  By signing above (or electronic signature), therapists plan is approved by physician      Patient: Lilly Crowder   : 1960   MRN: [id number]  Referring Physician: Referring Practitioner: Domo Rodriguez MD      Evaluation Date: 2020      Medical Diagnosis Information:  Diagnosis: I63.89 - Other cerebral infarction   Treatment Diagnosis: Weakness in R LE, impaired balance and gait, difficulty with stairs, decreased safety awareness                                         Insurance information: PT Insurance Information: [place] required through [employer]     Precautions/ Contra-indications: hx of cardiac comorbidities, will be getting a new pacemaker and defibrillator soon  Latex Allergy:  [x]NO      []YES  Preferred Language for Healthcare:   [x]English       []other:    SUBJECTIVE:   Pt suffered a CVA on 2020. He got up to go to the restroom in the middle of the night and it is suspected he had the stroke then. His dad found him sometime the next day. Patient was taken to [facility] ED. He was admitted and then went to ARU (about 2 weeks). And then went to a SNF. When he was [address] home, he then had home care (PT, OT, SLP). Pt states this lasted about a month.  Home care ended around to/and or personal factors that will affect rehab potential:              []Age  []Sex    [x]Smoker              [x]Motivation/Lack of Motivation                        [x]Co-Morbidities              [x]Cognitive Function, education/learning barriers              []Environmental, home barriers              []profession/work barriers  []past PT/medical experience  [x]other: pt lacking safety awareness    Falls Risk Assessment (30 days):   [x] Falls Risk assessed and no intervention required. [] Falls Risk assessed and Patient requires intervention due to being higher risk   TUG score (>12s at risk):     [] Falls education provided, including       ASSESSMENT: Pt is a 62year old male referred to physical therapy after sustaining a CVA in January 2020. Pt received therapy in the acute hospital setting, acute rehab unit, in a SNF and also as home health. Pt is now presenting to outpatient therapy services to address his deficits in R LE strength, balance, and gait. Based on his Tinetti score, this patient is considered a high fall risk. Pt is also lacking safety awareness. This pt may benefit from skilled therapy to address the above issues and improve the patient's functional mobility.        Functional Impairments:     []Noted scapular/hip hypomobility   []Noted scapular/hip hypermobility   [x]Assistance required with functional mobility/gait for safety   [x]Decreased core/proximal hip strength and neuromuscular control    [x]Decreased UE/LE functional strength    []Abnormal reflexes/sensation/myotomal/dermatomal deficits  []Hypertonic UE/LE   []Hypotonic UE/LE  []Dislocated//Hypermobile Glenohumeral joint  [x]Impaired balance/coordination/proprioceptive control    []other:      Functional Activity Limitations (from functional questionnaire and intake)   []Reduced ability to tolerate prolonged functional positions   [x]Reduced ability or difficulty with changes of positions or transfers between positions   []Reduced ability to maintain good posture and demonstrate good body mechanics with sitting, bending, and lifting   []Reduced ability to sleep   [x] Reduced ability or tolerance with driving and/or computer work   [x]Reduced ability to perform lifting, reaching, carrying tasks   [x]Reduced ability to squat   [x]Reduced ability to forward bend   [x]Reduced ability to ambulate prolonged functional periods/distances/surfaces   [x]Reduced ability to ascend/descend stairs   []other:       Participation Restrictions   [x]Reduced participation in self care activities   [x]Reduced participation in home management activities   []Reduced participation in work activities   [x]Reduced participation in social activities. []Reduced participation in sport/recreational activities.     Classification:   [x]Signs/symptoms consistent with Primary prevention/risk reduction for loss of balance and falls    []Signs/symptoms consistent with Impaired neuromotor development   [x]Signs/symptoms consistent with Impaired motor function and sensory integrity associated w/non-progressive disorders of CNS - Congenital/Aquired in Infancy or Childhood or Acquired in Adolescence or adulthood   []Signs/symptoms consistent with Impaired motor function and sensory integrity associate w/progressive disorders of the CNS     []Signs/symptoms consistent with Impaired motor function and sensory integrity associated w/acute or chronic polyneuropathies   []Signs/symptoms consistent with Impaired motor function, peripheral nerve integrity, and sendory integrity associated w/non-progressive disorders of spinal cord   []other:      Prognosis/Rehab Potential:   Tolerance of evaluation/treatment:    []Excellent     []Excellent   []Good     []Good   [x]Fair      [x]Fair   []Poor      []Poor      Physical Therapy Evaluation Complexity Justification  [x] A history of present problem with:  [] no personal factors and/or comorbidities that impact the plan

## 2020-06-01 NOTE — FLOWSHEET NOTE
[] LE / Core stability: LE, hip, and core control in self care, mobility, lifting, ambulation and eccentric single leg control. [] UE / Shoulder complex: cervical, scapular, scapulothoracic and UE control with self care, reaching, carrying, lifting, house/yardwork, driving, computer work.   [] (12837) Therapist is in constant attendance of 2 or more patients providing skilled therapy interventions, but not providing any significant amount of measurable one-on-one time to either patient, for improvements in  [] LE / Core stability: LE, hip, and core control in self care, mobility, lifting, ambulation and eccentric single leg control. [] UE / Shoulder complex: cervical, scapular, scapulothoracic and UE control with self care, reaching, carrying, lifting, house/yardwork, driving, computer work.      Home Exercise Program:    [] (59719) Reviewed/Progressed HEP activities related to strengthening, flexibility, endurance, ROM of   [] LE / Core stability: core, hip and LE for functional self-care, mobility, lifting and ambulation/stair navigation   [] UE / Shoulder complex: cervical, postural, scapular, scapulothoracic and UE control with self care, reaching, carrying, lifting, house/yardwork, driving, computer work  [] (69600)Reviewed/Progressed HEP activities related to improving balance, coordination, kinesthetic sense, posture, motor skill, proprioception of   [] LE: core, hip and LE for self care, mobility, lifting, and ambulation/stair navigation    [] UE / Shoulder complex: cervical, postural,  scapular, scapulothoracic and UE control with self care, reaching, carrying, lifting, house/yardwork, driving, computer work    Manual Treatments:  PROM / STM / Oscillations-Mobs:  G-I, II, III, IV (PA's, Inf., Post.)  [] (28981) Provided manual therapy to mobilize shoulder complex, hip, LE, and/or cervicothoracic/LS spine soft tissue/joints for the purpose of modulating pain, promoting relaxation,  increasing ROM, care initiated [] Hold pending MD visit [] Discharge    Electronically signed by: Cheyanne Melgoza PT, DPT    Note: If patient does not return for scheduled/ recommended follow up visits, his note will serve as a discharge from care along with most recent update on progress.

## 2020-06-09 ENCOUNTER — HOSPITAL ENCOUNTER (OUTPATIENT)
Dept: OCCUPATIONAL THERAPY | Age: 60
Setting detail: THERAPIES SERIES
Discharge: HOME | End: 2020-06-09
Payer: COMMERCIAL

## 2020-06-09 PROCEDURE — 97166 OT EVAL MOD COMPLEX 45 MIN: CPT

## 2020-06-09 PROCEDURE — 97022 WHIRLPOOL THERAPY: CPT

## 2020-06-09 PROCEDURE — 97530 THERAPEUTIC ACTIVITIES: CPT

## 2020-06-09 NOTE — PROGRESS NOTES
[] OT Re-eval (73371)  [x] EVAL (MOD) 10162   [] EVAL (HIGH) 06089       [] Mitzy (61662) x     [] YZTQS(80554)  [] NMR (11616) x     [] Estim (attended) (15561)   [] Manual (43176) x     [] US (20309)  [] TA (48148) x     [] Paraffin (28718)  [] ADL  (18610) x    [] Splint/L code:    [] Estim (unattended) ([id number])  [] Fluidotherapy (49766)  [] Other:    GOALS:  GOALS:  Patient stated goal: drive again, improve coordination right ue to be able to play pool, drums and guitar  []? Progressing: []? Met: []? Not Met: []? Adjusted     Therapist goals for Patient:   Short Term Goals: To be achieved in: 30 days  1. Pt will demonstrate good coordination to play pool 7/9/20  []? Progressing: []? Met: []? Not Met: []? Adjusted  2. Pt will demonstrate good cognitive processing to return to driving 3/9/84  []? Progressing: []? Met: []? Not Met: []? Adjusted     Long Term Goals: To be achieved by cristobal  1. Pt will will report a QuickDASH Symptom Severity Scale score of 50% or less indicating increased safety and functional independence in desired occupational pursuits by discharge. []? Progressing: []? Met: []? Not Met: []? Adjusted  Electronically signed by:  Eliot Pham OT    Progression Towards Functional goals:  [] Patient is progressing as expected towards functional goals listed. [] Progression is slowed due to complexities listed. [] Progression has been slowed due to co-morbidities.   [x] Plan just implemented, too soon to assess goals progression  [] All goals are met  [] Other:     ASSESSMENT:  See eval    Treatment/Activity Tolerance:  [x] Patient tolerated treatment well [] Patient limited by fatique  [] Patient limited by pain  [] Patient limited by other medical complications  [] Other:     Prognosis: [x] Good [] Fair  [] Poor    Patient Requires Follow-up: [x] Yes  [] No    PLAN: See eval  [] Continue per plan of care [] Alter current plan (see comments)  [x] Plan of care initiated [] Hold pending MD visit [] Discharge    Electronically signed by: Burton Gaffney    Note: If patient does not return for scheduled/ recommended follow up visits, this note will serve as a discharge from care along with most recent update on progress.

## 2020-06-09 NOTE — PROGRESS NOTES
[place] [address] [facility], [address]  Phone: [phone]   Fax: [phone]                                                     Kirt Lor Dear [address]. [facility]         Practitioner Signature:_______________________________Date:__________________  By signing above (or electronic signature), therapists plan is approved by the referring practicioner      Patient: Reynold Kay   : 1960   MRN: [id number]  Referring Practicioner:       Medical Diagnosis Information:CVA                                                   Insurance information:   anthem 20 visits a year each discipline    Precautions/ Contra-indications:    Latex Allergy:  []NO      []YES  Preferred Language for Healthcare:   [x]English       []other:  OCCUPATIONAL PROFILE  Reason for Seeking OT Services and Patient Goals: patient reports he hurts his whole right side  Personal Interests and Values:working on [employer]s, playing basket ball, sports, shooting pool,     Occupational History (Life Experiences Including Prior Level of Function):lives alone one story home    Performance Patterns (Routines, Roles, Rituals, & Habits): patient likes to play guitar, drums, driving his corvette however he is not doing any of that at  This time.      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      Palpation     Bandages/Dressings/Incisions:      ROM WFL: [x]Yes []No (if checked, see below)     PROM AROM    L R L R   Shoulder Flexion        Shoulder Abduction        Shoulder External Rotation        Shoulder Internal Rotation        Elbow Flexion        Elbow Extension        Pronation        Supination        Wrist Flexion        Wrist Extension        Radial Deviation        Ulnar Deviation       CMC Abduction       5th Digit MCP Extension-Flexion       4th Digit MCP Extension-Flexion       3rd Digit MCP Extension-Flexion       2nd Digit MCP Extension-Flexion       1st Digit MCP Extension-Flexion       5th Digit PIP Extension- Flexion       4th Digit PIP Extension-Flexion       3rd Digit PIP Extension-Flexion       2nd Digit PIP Extension-Flexion       1st Digit IP Extension-Flexion       5th Digit DIP Extension-Flexion       4th Digit DIP Extension-Flexion       3rd Digit DIP Extension-Flexion       2nd Digit DIP Extension-Flexion       Composite Flexion (Tip of 3rd Digit to Distal Palmar Crease (cm))         Joint mobility:    [x]Normal    []Hypo   []Hyper    Strength WFL: []Yes [x]No (if checked, see below)    Strength (0-5) Left Right    Shoulder Flex  4-   Shoulder Abd     Shoulder ER  4-   Shoulder IR     Biceps     Triceps     Pronation      Supination   4-   Wrist Flex     Wrist Ext     Wrist Radial Deviation         Orthopaedic Special Tests Positive  Negative  NT Comments    Shoulder        Empty Can              Elbow        Cozen's       Tinel's               Hand/Wrist       Finkelstein's       Tinel's       Phalen's       Froment's       Giles Sign       [place] Deformity       [place] Neck Deformity       [place]berden's Nodes (DIP)       Alfredito's Nodes (PIP)       Mallet Finger       Grind Test Susan Nunes)                      Hand Assessment Left  Right  Comments    9 Hole Peg Test (s)       Strength (lbs)      Lateral Pinch Strength (lbs)      Palmar Pinch Strength (lbs)      Tip Pinch Strength (lbs)      Opposition (Y/N)      Functional Outcome Measures:                      QuickDASH Total Score: 38                   Functional Mobility/Transfers: supervision, independent     Posture:  Decreased ability to maintain base of support, mild ataxia    Synergy/Muscle tone: mild hypotonicity    Sensation: : impaired burning sensation in hand and whole right side, impaired proprioception    Coordination: impaired coordination     Visual Acuity: wears glasses    Perception:wnl    MVPT:  30/36    Trail making A: 36 seconds    Trail making B: 2 minutes 50 seconds, unable to finish or identify missed letter or number due to impaired visual attention    Visual field cut:    Cognition: impaired self awareness, insight                  [x] Patient history, allergies, meds reviewed. Medical chart reviewed. See intake form. Review Of Systems (ROS):  [x]Performed Review of systems (Integumentary, CardioPulmonary, Neurological) by intake and observation. Intake form has been scanned into medical record. Patient has been instructed to contact their primary care physician regarding ROS issues if not already being addressed at this time.       Co-morbidities/Complexities (which will affect course of rehabilitation):   []None           Arthritic conditions   []Rheumatoid arthritis (M05.9)  []Osteoarthritis (M19.91)   Cardiovascular conditions   [x]Hypertension (I10)  [x]Hyperlipidemia (E78.5)  []Angina pectoris (I20)  [x]Atherosclerosis (I70)   Musculoskeletal conditions   []Disc pathology   []Congenital spine pathologies   []Prior surgical intervention  []Osteoporosis (M81.8)  []Osteopenia (M85.8)   Endocrine conditions   []Hypothyroid (E03.9)  []Hyperthyroid Gastrointestinal conditions   []Constipation (K59.90)   Metabolic conditions   []Morbid obesity (E66.01)  [x]Diabetes type 1(E10.65) or 2 (E11.65)   [x]Neuropathy (G60.9)     Pulmonary conditions []Asthma (J45)  []Coughing   []COPD (J44.9)   Psychological Disorders  []Anxiety (F41.9)  []Depression (F32.9)   []Other:   [x]Other:    Hx of drug abuse       Barriers to/and or personal factors that will affect rehab potential:              []Age  []Sex    []Smoker              [x]Motivation/Lack of Motivation                        [x]Co-Morbidities              [x]Cognitive Function, education/learning barriers              []Environmental, home barriers              []profession/work barriers  []past PT/medical experience  []other:  Justification:  Patient will benefit from skilled therapy intervention to improve ue function, cognitive function and independence with ADL and IADL     Falls Risk Assessment (30 days):    [] Falls risk assessed and no intervention required. [x] Falls risk assessed (via clinical reasoning) and patient requires intervention due to being higher risk for falls  [x] Falls education provided, including pateint will need supervision in therapy environment    ASSESSMENT: The pt has chief complaints of decreased sensation, coordination and strength. These symptoms as well as client factor and performance skill deficits create barriers to the patient engaging in desired occupational pursuits. Therefore, the patient is in need of skilled occupational therapy services to mitigate functional deficits in order to allow the patient to return to baseline, prevent further decline, and/or compensate for decreased functional abilities.       Functional Impairments and Activity Limitations (from functional questionnaire, intake, and interview)    [x]Reduced participation in functional mobility   [x]Reduced cognition   [x]Reduced participation in high level IADLs   [x]Reduced participation ADLs   [x]Reduced endurance  [x]Reduced fine motor control   []Reduced ROM   [x]Reduced sensation   []Reduced participation ADLs   [x]Reduced coordination  [x]Reduced strength   [x]Reduced balance   []Reduced

## 2020-06-10 ENCOUNTER — HOSPITAL ENCOUNTER (OUTPATIENT)
Dept: PHYSICAL THERAPY | Age: 60
Setting detail: THERAPIES SERIES
Discharge: HOME | End: 2020-06-10
Payer: COMMERCIAL

## 2020-06-10 PROCEDURE — 97110 THERAPEUTIC EXERCISES: CPT

## 2020-06-10 PROCEDURE — 97112 NEUROMUSCULAR REEDUCATION: CPT

## 2020-06-10 NOTE — FLOWSHEET NOTE
awareness     Interventions/Exercises:   Therapeutic Activities (87356) /  Functional Tasks Resistance / level Sets/sec Reps Notes                                                                  Neuromuscular Re-ed (58451) /  Gait Training (12811)       //Bars  BOSU  · Fwd step-ups  · Squats  · Lunges     Blue  Blk  Blue      10 B  10  10 B    Mat table:  Quadruped  · Hip ext    Tall kneeling  1/2 kneeling    Prone  · Plank on elbows  · Quad sets          10\", 45\"        2\", 2x10\"     10 B              20 B                 -unable to complete 1st attempt          Gait w/SPC                     Therapeutic Exercises (50451)       Bike 3.0 7 mins     IB/HR  2x30\" 10                                Manual Intervention (09242)       Prone  · Quad stretch    3x20\" R                                            Pt. Education:  6/1:  -patient educated on diagnosis, prognosis and expectations for rehab  -all patient questions were answered    HEP instruction:  6/1:  -patient provided with written and illustrated instructions for HEP     NMR and Therapeutic Activities:    [x] (99501 or 22222) Provided verbal/tactile cueing for activities related to improving balance, coordination, kinesthetic sense, posture, motor skill, proprioception and motor activation to allow for proper function of   [x] LE / Core, hip and LE with self care and ADLs  [] UE / Shoulder complex: cervical, postural, scapular, scapulothoracic and UE control with self care, carrying, lifting, driving, computer work.   [] (52362) Gait Re-education- Provided training and instruction to the patient for proper LE, core and hip recruitment, positioning, and eccentric body weight control with ambulation re-education, including ascending & descending stairs     Home Management Training / Self Care:  [] (38741) Provided self-care/home management training related to activities of daily living and compensatory training, and/or use of adaptive equipment for improvement with: ADLs and compensatory training, meal preparation, safety procedures and instruction in use of adaptive equipment, including bathing, grooming, dressing, personal hygiene, basic household cleaning and chores. Therapeutic Exercise and NMR EXR  [x] (25998) Provided verbal/tactile cueing for activities related to strengthening, flexibility, endurance, ROM for improvements in  [x] LE / Core stability: LE, hip, and core control with self care, mobility, lifting, ambulation. [] UE / Shoulder complex: cervical, postural, scapular, scapulothoracic and UE control with self care, reaching, carrying, lifting, house/yardwork, driving, computer work.  [] (89101) Provided verbal/tactile cueing for activities related to improving balance, coordination, kinesthetic sense, posture, motor skill, proprioception to assist with   [] LE / Core stability: LE, hip, and core control in self care, mobility, lifting, ambulation and eccentric single leg control. [] UE / Shoulder complex: cervical, scapular, scapulothoracic and UE control with self care, reaching, carrying, lifting, house/yardwork, driving, computer work.   [] (69354) Therapist is in constant attendance of 2 or more patients providing skilled therapy interventions, but not providing any significant amount of measurable one-on-one time to either patient, for improvements in  [] LE / Core stability: LE, hip, and core control in self care, mobility, lifting, ambulation and eccentric single leg control. [] UE / Shoulder complex: cervical, scapular, scapulothoracic and UE control with self care, reaching, carrying, lifting, house/yardwork, driving, computer work.      Home Exercise Program:    [] (89166) Reviewed/Progressed HEP activities related to strengthening, flexibility, endurance, ROM of   [] LE / Core stability: core, hip and LE for functional self-care, mobility, lifting and ambulation/stair navigation   [] UE / Shoulder complex: cervical, postural, scapular, scapulothoracic and UE control with self care, reaching, carrying, lifting, house/yardwork, driving, computer work  [] (41776)Reviewed/Progressed HEP activities related to improving balance, coordination, kinesthetic sense, posture, motor skill, proprioception of   [] LE: core, hip and LE for self care, mobility, lifting, and ambulation/stair navigation    [] UE / Shoulder complex: cervical, postural,  scapular, scapulothoracic and UE control with self care, reaching, carrying, lifting, house/yardwork, driving, computer work    Manual Treatments:  PROM / STM / Oscillations-Mobs:  G-I, II, III, IV (PA's, Inf., Post.)  [] (68947) Provided manual therapy to mobilize shoulder complex, hip, LE, and/or cervicothoracic/LS spine soft tissue/joints for the purpose of modulating pain, promoting relaxation,  increasing ROM, reducing/eliminating soft tissue swelling/inflammation/restriction, improving soft tissue extensibility and allowing for proper ROM for normal function with   [] LE / Core stability: self care, mobility, lifting and ambulation. [] UE / Shoulder complex: self care, reaching, carrying, lifting, house/yardwork, driving, computer work. Modalities:  [] (91148) Vasopneumatic compression: Utilized vasopneumatic compression to decrease edema / swelling for the purpose of improving mobility and quad tone / recruitment which will allow for increased overall function including but not limited to self-care, transfers, ambulation, and ascending / descending stairs.        Modalities:      Charges:  Timed Code Treatment Minutes: 55   Total Treatment Minutes: 55     [] EVAL - LOW (58966)   [] EVAL - MOD (29438)  [] EVAL - HIGH (73682)  [] RE-EVAL (02910)  [x] TE (20532) x 2      [] Ionto  [x] NMR (34403) x  2    [] Vaso  [] Manual (78085) x      [] Ultrasound  [] TA x       [] Mech Traction (34281)  [] Gait Training x     [] ES (un) (62913):   [] Aquatic therapy x   [] Other:   [] Group:     GOALS:   Patient stated goal: improve use of R arm and leg,  []? Progressing: []? Met: []? Not Met: []? Adjusted     Therapist goals for Patient:   Short Term Goals: To be achieved in: 2 weeks  1. Independent in HEP and progression per patient tolerance, in order to prevent re-injury. []? Progressing: []? Met: []? Not Met: []? Adjusted  2. Patient will have a decrease in pain to facilitate improvement in movement, function, and ADLs as indicated by Functional Deficits. []? Progressing: []? Met: []? Not Met: []? Adjusted     Long Term Goals: To be achieved in: 6 weeks  1. Patient to demonstrate TUG score <12 seconds to demonstrate improved fall risk to assist with reaching prior level of function. []? Progressing: []? Met: []? Not Met: []? Adjusted  2. Patient will demonstrate increased Tinetti score to >18 to decrease risk for falls. []? Progressing: []? Met: []? Not Met: []? Adjusted  3. Patient will demonstrate an increase in strength to good shoulder & hip complex, and core activation to allow for proper functional mobility as indicated by patients Functional Deficits. []? Progressing: []? Met: []? Not Met: []? Adjusted  4. Patient will return to functional activities including 1 flight of stairs with use of HR and reciprocal pattern without increased symptoms or restriction. []? Progressing: []? Met: []? Not Met: []? Adjusted  5. Patient will improve gait to reduce toe drag and general endurance to perform community ambulation without an AD.   []? Progressing: []? Met: []? Not Met: []? Adjusted         Overall Progression Towards Functional goals/ Treatment Progress Update:  [] Patient is progressing as expected towards functional goals listed. [] Progression is slowed due to complexities/Impairments listed. [] Progression has been slowed due to co-morbidities.   [x] Plan just implemented, too soon to assess goals progression <30days   [] Goals require adjustment due to lack of progress  [] Patient is not progressing as

## 2020-06-11 ENCOUNTER — HOSPITAL ENCOUNTER (OUTPATIENT)
Dept: OCCUPATIONAL THERAPY | Age: 60
Setting detail: THERAPIES SERIES
Discharge: HOME | End: 2020-06-11
Payer: COMMERCIAL

## 2020-06-11 PROCEDURE — 97537 COMMUNITY/WORK REINTEGRATION: CPT

## 2020-06-11 PROCEDURE — 97165 OT EVAL LOW COMPLEX 30 MIN: CPT

## 2020-06-11 NOTE — PROGRESS NOTES
Occupational Therapy  Name: Mary Molina  1960  Date: 6/11/2020  1:05 PM      Time In: 1pm  Time Out: 3pm  Diagnosis: CVA Jan 2, 2002  Seizure free for 1 year: yes    Hearing Aids: no  Glasses/contacts: reading only  Mobility Status: straight cane  Is client able to transfer into car: yes  License #[id number]   Restrictions on License: Commercial License:  U1 (medical restriction, yearly testing required per client) K- intrastate only  Expiration date: 11/08/2023   Last time client drove: Miko Riddles drove friend to [employer] store  Car Make/Model and transmission type: [address]; 2005 Chevy Half ton , automatic for both  Driving Habits: Frequency: daily  Night: yes  Snow: yes  Highway: ? yes  [employer] in last 5 years: license revoked 5 years ago from imprisonment, had to retest to obtain it again. Accidents in last 5 years: no  Explain:    VISION:  Acuity: ([facility] law =20/40 night driving; 79/10 day driving): __17/89______WBIIHAN corrective lenses  Peripheral field: ([address] requires 79? visual field on both sides of a fixation point for a non-restricted license or 39? on the other side)  INTACT    Color Vision:  INTACT      Saccades: (ability to rapidly change fixation from one point in the visual field to another)    INTACT   Pursuits: (the continued fixation of a moving object)    INTACT   Depth Perception:    INTACT   Motor Free Visual Perception Test (MVPT):  (Measures visual discrimination, visual memory, visual closure, visual organization, and figure ground skills) Tested at [facility] 30/36 with difficulty with visual attention      slightly IMPAIRED    COGNITION:  Trail Making Test Parts A & B (involve scanning, speed of mental processing and visual motor sequencing.   Trail Making Part B involves switching cognitive sets too)  Trail Making Part A:   ___37.09________seconds (Norm for Age/Education level:__31.32_______seconds)   Trail Making Part B:   __110.97_________seconds (Norm for

## 2020-06-15 ENCOUNTER — HOSPITAL ENCOUNTER (OUTPATIENT)
Dept: PHYSICAL THERAPY | Age: 60
Setting detail: THERAPIES SERIES
Discharge: HOME | End: 2020-06-15
Payer: COMMERCIAL

## 2020-06-15 PROCEDURE — 97112 NEUROMUSCULAR REEDUCATION: CPT

## 2020-06-15 PROCEDURE — 97110 THERAPEUTIC EXERCISES: CPT

## 2020-06-15 NOTE — FLOWSHEET NOTE
step-ups     Blue    Blue  Blue      10 B    10 B  10 B    Mat table:  Quadruped  · Hip ext    Tall kneeling  1/2 kneeling    Prone  · Plank on elbows  · Quad sets                                -unable to complete 1st attempt          Gait w/SPC                     Therapeutic Exercises (96550)       Bike 3.0 8 mins     IB/HR  2x30\" 10    SLR at CC 1.5 pl 1 15 R/L B HR support used                        Manual Intervention (53155)       Prone  · Quad stretch                                                Pt. Education:  6/1:  -patient educated on diagnosis, prognosis and expectations for rehab  -all patient questions were answered    HEP instruction:  6/1:  -patient provided with written and illustrated instructions for HEP     NMR and Therapeutic Activities:    [x] (72341 or 35604) Provided verbal/tactile cueing for activities related to improving balance, coordination, kinesthetic sense, posture, motor skill, proprioception and motor activation to allow for proper function of   [x] LE / Core, hip and LE with self care and ADLs  [] UE / Shoulder complex: cervical, postural, scapular, scapulothoracic and UE control with self care, carrying, lifting, driving, computer work.   [] (84881) Gait Re-education- Provided training and instruction to the patient for proper LE, core and hip recruitment, positioning, and eccentric body weight control with ambulation re-education, including ascending & descending stairs     Home Management Training / Self Care:  [] (39498) Provided self-care/home management training related to activities of daily living and compensatory training, and/or use of adaptive equipment for improvement with: ADLs and compensatory training, meal preparation, safety procedures and instruction in use of adaptive equipment, including bathing, grooming, dressing, personal hygiene, basic household cleaning and chores.      Therapeutic Exercise and NMR EXR  [x] (20185) Provided verbal/tactile cueing for activities related to strengthening, flexibility, endurance, ROM for improvements in  [x] LE / Core stability: LE, hip, and core control with self care, mobility, lifting, ambulation. [] UE / Shoulder complex: cervical, postural, scapular, scapulothoracic and UE control with self care, reaching, carrying, lifting, house/yardwork, driving, computer work.  [] (08919) Provided verbal/tactile cueing for activities related to improving balance, coordination, kinesthetic sense, posture, motor skill, proprioception to assist with   [] LE / Core stability: LE, hip, and core control in self care, mobility, lifting, ambulation and eccentric single leg control. [] UE / Shoulder complex: cervical, scapular, scapulothoracic and UE control with self care, reaching, carrying, lifting, house/yardwork, driving, computer work.   [] (82282) Therapist is in constant attendance of 2 or more patients providing skilled therapy interventions, but not providing any significant amount of measurable one-on-one time to either patient, for improvements in  [] LE / Core stability: LE, hip, and core control in self care, mobility, lifting, ambulation and eccentric single leg control. [] UE / Shoulder complex: cervical, scapular, scapulothoracic and UE control with self care, reaching, carrying, lifting, house/yardwork, driving, computer work.      Home Exercise Program:    [] (69640) Reviewed/Progressed HEP activities related to strengthening, flexibility, endurance, ROM of   [] LE / Core stability: core, hip and LE for functional self-care, mobility, lifting and ambulation/stair navigation   [] UE / Shoulder complex: cervical, postural, scapular, scapulothoracic and UE control with self care, reaching, carrying, lifting, house/yardwork, driving, computer work  [] (30937)Reviewed/Progressed HEP activities related to improving balance, coordination, kinesthetic sense, posture, motor skill, proprioception of   [] LE: core, hip and LE for self care, mobility, lifting, and ambulation/stair navigation    [] UE / Shoulder complex: cervical, postural,  scapular, scapulothoracic and UE control with self care, reaching, carrying, lifting, house/yardwork, driving, computer work    Manual Treatments:  PROM / STM / Oscillations-Mobs:  G-I, II, III, IV (PA's, Inf., Post.)  [] (50877) Provided manual therapy to mobilize shoulder complex, hip, LE, and/or cervicothoracic/LS spine soft tissue/joints for the purpose of modulating pain, promoting relaxation,  increasing ROM, reducing/eliminating soft tissue swelling/inflammation/restriction, improving soft tissue extensibility and allowing for proper ROM for normal function with   [] LE / Core stability: self care, mobility, lifting and ambulation. [] UE / Shoulder complex: self care, reaching, carrying, lifting, house/yardwork, driving, computer work. Modalities:  [] (53642) Vasopneumatic compression: Utilized vasopneumatic compression to decrease edema / swelling for the purpose of improving mobility and quad tone / recruitment which will allow for increased overall function including but not limited to self-care, transfers, ambulation, and ascending / descending stairs. Modalities:      Charges:  Timed Code Treatment Minutes: 44   Total Treatment Minutes: 44     [] EVAL - LOW (87303)   [] EVAL - MOD (73670)  [] EVAL - HIGH (93399)  [] RE-EVAL (04863)  [x] TE (23304) x 2      [] Ionto  [x] NMR (98543) x  1    [] Vaso  [] Manual (80627) x      [] Ultrasound  [] TA x       [] Mech Traction (75649)  [] Gait Training x     [] ES (un) (92459):   [] Aquatic therapy x   [] Other:   [] Group:     GOALS:   Patient stated goal: improve use of R arm and leg,  []? Progressing: []? Met: []? Not Met: []? Adjusted     Therapist goals for Patient:   Short Term Goals: To be achieved in: 2 weeks  1. Independent in HEP and progression per patient tolerance, in order to prevent re-injury. []? Progressing: []? Met: []?  Not

## 2020-06-16 ENCOUNTER — APPOINTMENT (OUTPATIENT)
Dept: SPEECH THERAPY | Age: 60
End: 2020-06-16
Payer: COMMERCIAL

## 2020-06-17 ENCOUNTER — HOSPITAL ENCOUNTER (OUTPATIENT)
Dept: PHYSICAL THERAPY | Age: 60
Setting detail: THERAPIES SERIES
Discharge: HOME | End: 2020-06-17
Payer: COMMERCIAL

## 2020-06-23 ENCOUNTER — HOSPITAL ENCOUNTER (OUTPATIENT)
Dept: PHYSICAL THERAPY | Age: 60
Setting detail: THERAPIES SERIES
Discharge: HOME | End: 2020-06-23
Payer: COMMERCIAL

## 2020-06-23 ENCOUNTER — HOSPITAL ENCOUNTER (OUTPATIENT)
Dept: OCCUPATIONAL THERAPY | Age: 60
Setting detail: THERAPIES SERIES
Discharge: HOME | End: 2020-06-23
Payer: COMMERCIAL

## 2020-07-09 ENCOUNTER — NURSE ONLY (OUTPATIENT)
Dept: CARDIOLOGY CLINIC | Age: 60
End: 2020-07-09
Payer: COMMERCIAL

## 2020-07-09 ENCOUNTER — OFFICE VISIT (OUTPATIENT)
Dept: CARDIOLOGY CLINIC | Age: 60
End: 2020-07-09
Payer: COMMERCIAL

## 2020-07-09 VITALS
SYSTOLIC BLOOD PRESSURE: 105 MMHG | HEIGHT: 72 IN | RESPIRATION RATE: 20 BRPM | WEIGHT: 202 LBS | DIASTOLIC BLOOD PRESSURE: 74 MMHG | BODY MASS INDEX: 27.36 KG/M2 | HEART RATE: 85 BPM

## 2020-07-09 PROCEDURE — 99215 OFFICE O/P EST HI 40 MIN: CPT | Performed by: INTERNAL MEDICINE

## 2020-07-09 PROCEDURE — 93283 PRGRMG EVAL IMPLANTABLE DFB: CPT | Performed by: INTERNAL MEDICINE

## 2020-07-09 RX ORDER — ESCITALOPRAM OXALATE 10 MG/1
TABLET ORAL
COMMUNITY
Start: 2020-06-08

## 2020-07-09 RX ORDER — SACUBITRIL AND VALSARTAN 49; 51 MG/1; MG/1
TABLET, FILM COATED ORAL
COMMUNITY
Start: 2020-06-12

## 2020-07-09 RX ORDER — TRAZODONE HYDROCHLORIDE 50 MG/1
50 TABLET ORAL NIGHTLY
COMMUNITY

## 2020-07-09 NOTE — LETTER
[facility]    7/9/20  Lanie Elm  1960  EP Procedures     Pacemaker implant (single/dual)  EP Study    ICD implant (single/dual)  Atrial flutter ablation (YAHAIRA Y/N)    Biv implant ICD  Tilt Table    Biv implant PPM  Atrial fibrillation ablation (YAHAIRA Yes)   xxx Generator Change (ICD)  SVT ablation    Lead revision (RV/LA/RA) (<1 month)  VT ablation      Lead extraction +/- upgrade (BiV/PPM/ICD)  VT Ischemic/ non-ischemic    Loop implant/ removal  VT RVOT    Cardioversion  VT Left sided    YAHAIRA  AVN ablation     Equipment    xxx Medtronic   JAMEL Mapping System    [place]  Carto Mapping System    [employer]  CryoAblation    Biotronik  Laser Lead Extraction     EP Procedures Scheduling Request    # hours Requested   Scheduled  Date:   Specific Day  Completed    Anesthesia  F/u Date:   CT surgery backup      Overnight stay      Location [facility]       Pre-Procedure Labs / Imaging     PT/INR  Type & cross    CBC  Units PRBC    BMP/Mg  Units FFP    Venogram  CXR    Echo  Cardiac CTA for Pulmonary vein mapping     RN INITIALS: RA    Patient Instructions  Do not eat or drink after midnight the night prior to procedure  Dx:CM/ICD LASHONDA campoverde will schedule [phone]

## 2020-07-09 NOTE — PROGRESS NOTES
Remaining battery life is LASHONDA 2.62V    Pt seen in clinic today for cardiac device interrogation. Their device is a MDT 2 chamber ICD  Based on threshold, impedance, and intrinsic sensing tests run today, the device appears to be functioning normally. AP 5.79%                  0.05%      Episodes: 2 episodes NSVT - 2 seconds longest     Pt was informed of findings today and general questions have been answered with regard to device. Home monitoring hardware is transmitting on schedule. Pt to see Dr. Jerod Sharma in clinic today following their device check.

## 2020-07-09 NOTE — PROGRESS NOTES
[address]   Electrophysiology Follow Up  Date: 7/9/2020      Chief Complaint   Patient presents with    Follow-up     Discuss GEN change    Cardiomyopathy        HPI: Veronica Olson is a 62 y.o. pmh significant for CAD, cardiomyopathy s/p ICD per ,hypercholesterolemia, meth and cocaine abuse and non compliance with medicine. The patient was found down at home by his step dad on the floor with right sided paralysis, unknown the timing of when he was last at his baseline, was not candidate for TPA.  His CT was consistent with CVA    His device is at [facility]. No cardiac complaints  Has burning sensation on Rt side of body since CVA    Past Medical History:   Diagnosis Date    CAD (coronary artery disease)     MI CABG x5    Chronic kidney disease     Collapsed lung     Bilat    GERD (gastroesophageal reflux disease)     Hematuria     Hyperlipidemia     Hypertension     ICD (implantable cardioverter-defibrillator) in place     Mural thrombus of left ventricle     Tortuous aorta (HCC)         Past Surgical History:   Procedure Laterality Date    ARM DEBRIDEMENT  10-26-10    incision and drainage bilateral arms    CARDIAC SURGERY      three stents    CHOLECYSTECTOMY, OPEN      COLON SURGERY      COLONOSCOPY  07/18/2016    COLONOSCOPY  11/20/2019    COLONOSCOPY POLYPECTOMY SNARE/COLD BIOPSY performed by Galdino Anne MD at [address] [place] [address] Drive Left 2013    defibrillator    UPPER GASTROINTESTINAL ENDOSCOPY  05/01/2017    esophageal erosions, esophageal dilation, biopsy    UPPER GASTROINTESTINAL ENDOSCOPY  06/27/2017    dilated       Allergies:  No Known Allergies    Social History:   reports that he has been smoking cigarettes. He has a 15.00 pack-year smoking history. He has never used smokeless tobacco. He reports current drug use. Drug: Cocaine. He reports that he does not drink alcohol.      Family History:  family history includes Alcohol Abuse in his father; 2020    TSH 1.00 10/14/2019    CREATININE 1.2 2020    CREATININE 1.0 2020    AST 16 2020    ALT 14 2020       EC20  Sinus rhythm    Echo: 2020    Summary   -Left ventricular cavity size is mildly dilated. -There is mild asymetric left ventricular hypertrophy.   -Overall left ventricular systolic function appears moderately reduced with   an ejection fraction in the 35% range.   -There is severe hypokinesis of the basal and inferoseptal walls. -Grade I diastolic dysfunction with normal LV filling pressures. E/e\"=9.2.   -Trivial mitral regurgitation.   -Aortic valve appears sclerotic but opens adequately.   -The left atrium is mildly dilated.   -A bubble study was performed and fails to show evidence of shunting.   -Pacer / ICD wire is visualized in the right heart. Stress: 18      Summary    There are moderate-large fixed anterior and fixed inferior lateral defects    of severe intensity c/w scar.    There is no ischemia.    There is severe LV dysfunction with EF=28%.    This is similar to study in 2013.        Recommendation    Discussed with pt.   Maria Teresa Faustin will f/u with primary cardiologist to review. Medication:  Current Outpatient Medications   Medication Sig Dispense Refill    escitalopram (LEXAPRO) 10 MG tablet TK 1 T PO QD      ENTRESTO 49-51 MG per tablet TK 1 T PO BID      traZODone (DESYREL) 50 MG tablet Take 50 mg by mouth nightly      carvedilol (COREG) 3.125 MG tablet Take 1 tablet by mouth 2 times daily 180 tablet 3    gabapentin (NEURONTIN) 300 MG capsule Take 300 mg by mouth 2 times daily.  pantoprazole (PROTONIX) 40 MG tablet TAKE 1 TABLET BY MOUTH DAILY 60 tablet 0    ASPIRIN LOW DOSE 81 MG EC tablet TAKE 1 TABLET BY MOUTH DAILY 30 tablet 0    atorvastatin (LIPITOR) 80 MG tablet Take 1 tablet by mouth daily 90 tablet 3     No current facility-administered medications for this visit.         Assessment and plan:   Cardiomyopathy, EF 35%  On meds  S/p ICD  The CIED was interrogated and programmed and I supervised and reviewed all the data. All findings and changes are in device interrogation sheat and reflect my personal interpretation and changes and is scanned to [place]. LASHONDA aguilar, will schedule Gen Change    CVA  Right sided weakness  Complaints of nerve pain on the right side    HTN  Vitals:    07/09/20 1558   BP: 105/74   Pulse: 85   Resp: 20     -Home BP monitoring encourage with a BP goal <130/80    Coronary artery disease/ cardiomyopathy     S/p CABG, AICD 4/13 11/16 4/13 CX stent patent, D1 100%, LIMA- OM patient but occluded to LAD, ramus severe, RCA 40-60%  Cx stent patent, D1 100%, LIMA-OM patent but occluded to LAD, ramus severe     Last Myoview stress test: 7/224/18: EF 28%, showed no ischemia     Last ECHO: 1/3/2020: EF 35%    Implantable cardioverter defibrillator(ICD)    The CIED was interrogated and programmed and I supervised and reviewed all the data. All findings and changes are in device interrogation sheat and reflect my personal interpretation and changes and is scanned to Epic. I independently reviewed device check interrogation     Thank you for allowing me to participate in the care of [address] [place] [address]. Further evaluation will be based upon the patient's clinical course and testing results. All questions and concerns were addressed to the patient/family. Alternatives to my treatment were discussed. I have discussed the above stated plan and the patient verbalized understanding and agreed with the plan. NOTE: This report was transcribed using voice recognition software. Every effort was made to ensure accuracy, however, inadvertent computerized transcription errors may be present. Dimitrios Brooks MD, Maci Lopez [address] [place] [address]   Office: [phone]     Scribe attestation:  This note was scribed in the presence of Dimitrios Brooks MD by Rigo Shay RN    I, Dr. Dimitrios Brooks personally performed the services described in this documentation as scribed by RN in my presence, and it is both accurate and complete.

## 2020-07-21 ENCOUNTER — OFFICE VISIT (OUTPATIENT)
Dept: PRIMARY CARE CLINIC | Age: 60
End: 2020-07-21
Payer: COMMERCIAL

## 2020-07-21 PROCEDURE — 99211 OFF/OP EST MAY X REQ PHY/QHP: CPT | Performed by: NURSE PRACTITIONER

## 2020-07-21 NOTE — PATIENT INSTRUCTIONS
Steps to help prevent the spread of COVID-19 if you are sick  SOURCE - [email or web address]-[email or web address].info/. html     Stay home except to get medical care   ; Stay home: People who are mildly ill with COVID-19 are able to isolate at home during their illness. You should restrict activities outside your home, except for getting medical care.   ; Avoid public areas: Do not go to work, school, or public areas.   ; Avoid public transportation: Avoid using public transportation, ride-sharing, or taxis.  ; Separate yourself from other people and animals in your home   ; Stay away from others: As much as possible, you should stay in a specific room and away from other people in your home. Also, you should use a separate bathroom, if available.   ; Limit contact with pets & animals: You should restrict contact with pets and other animals while you are sick with COVID-19, just like you would around other people. Although there have not been reports of pets or other animals becoming sick with COVID-19, it is still recommended that people sick with COVID-19 limit contact with animals until more information is known about the virus. ; When possible, have another member of your household care for your animals while you are sick. If you are sick with COVID-19, avoid contact with your pet, including petting, snuggling, being kissed or licked, and sharing food. If you must care for your pet or be around animals while you are sick, wash your hands before and after you interact with pets and wear a facemask. See COVID-19 and Animals for more information. Other considerations   The ill person should eat/be fed in their room if possible. Non-disposable  items used should be handled with gloves and washed with hot water or in a . Clean hands after handling used  items.  If possible, dedicate a lined trash can for the ill person.  Use gloves when removing garbage bags, handling, and disposing of trash. Wash hands after handling or disposing of trash.  Consider consulting with your local health department about trash disposal guidance if available. Information for Household Members and Caregivers of Someone who is Sick   Call ahead before visiting your doctor   Call ahead: If you have a medical appointment, call the healthcare provider and tell them that you have or may have COVID-19. This will help the healthcare provider's office take steps to keep other people from getting infected or exposed. Wear a facemask if you are sick   ; If you are sick: You should wear a facemask when you are around other people (e.g., sharing a room or vehicle) or pets and before you enter a healthcare provider's office. ; If you are caring for others: If the person who is sick is not able to wear a facemask (for example, because it causes trouble breathing), then people who live with the person who is sick should not stay in the same room with them, or they should wear a facemask if they enter a room with the person who is sick. Cover your coughs and sneezes   ; Cover: Cover your mouth and nose with a tissue when you cough or sneeze.   ; Dispose: Throw used tissues in a lined trash can.   ; Wash hands: Immediately wash your hands with soap and water for at least 20 seconds or, if soap and water are not available, clean your hands with an alcohol-based hand  that contains at least 60% alcohol. Clean your hands often   ;  Wash hands: Wash your hands often with soap and water for at least 20 seconds, especially after blowing your nose, coughing, or sneezing; going to the bathroom; and before eating or preparing food.   ; Hand : If soap and water are not readily available, use an alcohol-based hand  with at least 60% alcohol, covering all surfaces of your hands and rubbing them together until they feel dry.   ; Soap and water: Soap and water are the best option if hands are visibly dirty.   ; Avoid touching: Avoid touching your eyes, nose, and mouth with unwashed hands. Handwashing Tips   ; Wet your hands with clean, running water (warm or cold), turn off the tap, and apply soap.  ; Lather your hands by rubbing them together with the soap. Lather the backs of your hands, between your fingers, and under your nails. ; Scrub your hands for at least 20 seconds. Need a timer? Hum the [place] from beginning to end twice.  ; Rinse your hands well under clean, running water.  ; Dry your hands using a clean towel or air dry them. Avoid sharing personal household items   ; Do not share: You should not share dishes, drinking glasses, cups, eating utensils, towels, or bedding with other people or pets in your home.   ; Wash thoroughly after use: After using these items, they should be washed thoroughly with soap and water. Clean all high-touch surfaces everyday   ; Clean and disinfect: Practice routine cleaning of high touch surfaces.  ; High touch surfaces include counters, tabletops, doorknobs, bathroom fixtures, toilets, phones, keyboards, tablets, and bedside tables.  ; Disinfect areas with bodily fluids: Also, clean any surfaces that may have blood, stool, or body fluids on them.   ; Household : Use a household cleaning spray or wipe, according to the label instructions. Labels contain instructions for safe and effective use of the cleaning product including precautions you should take when applying the product, such as wearing gloves and making sure you have good ventilation during use of the product.     Monitor your symptoms   Seek medical attention: Seek prompt medical attention if your illness is worsening     (e.g., difficulty breathing).   ; Call your doctor: Before seeking care, call your healthcare provider and tell them that you have, or are being evaluated for, COVID-19.   ; Wear a facemask when sick: Put on a facemask before you enter the facility. These steps will help the healthcare provider's office to keep other people in the office or waiting room from getting infected or exposed. ; Alert health department: Ask your healthcare provider to call the local or state health department. Persons who are placed under active monitoring or facilitated self-monitoring should follow instructions provided by their local health department or occupational health professionals, as appropriate.  ; Call 911 if you have a medical emergency: If you have a medical emergency and need to call 911, notify the dispatch personnel that you have, or are being evaluated for COVID-19. If possible, put on a facemask before emergency medical services arrive. Thank you for enrolling in [address] [facility]. Please follow the instructions below to securely access your online medical record. [employer] allows you to send messages to your doctor, view your test results, renew your prescriptions, schedule appointments, and more. How Do I Sign Up? 1. In your Internet browser, go to [email or web address]  2. Click on the Sign Up Now link in the Sign In box. You will see the New Member Sign Up page. 3. Enter your [employer] Access Code exactly as it appears below. You will not need to use this code after youve completed the sign-up process. If you do not sign up before the expiration date, you must request a new code. MyChart Access Code: Activation code not generated  Current [employer] Status: Patient Declined    4. Enter your Social Security Number ([id number]) and Date of Birth (mm/dd/yyyy) as indicated and click Submit. You will be taken to the next sign-up page. 5. Create a [employer] ID. This will be your [employer] login ID and cannot be changed, so think of one that is secure and easy to remember. 6. Create a [employer] password. You can change your password at any time. 7. Enter your Password Reset Question and Answer.  This can be used at a later time if you forget your password. 8. Enter your e-mail address. You will receive e-mail notification when new information is available in [address] 9. Click Sign Up. You can now view your medical record. Additional Information  If you have questions, please contact your physician practice where you receive care. Remember, MyChart is NOT to be used for urgent needs. For medical emergencies, dial 911.

## 2020-07-27 LAB
SARS-COV-2: NOT DETECTED
SOURCE: NORMAL

## 2020-07-28 ENCOUNTER — HOSPITAL ENCOUNTER (OUTPATIENT)
Dept: CARDIAC CATH/INVASIVE PROCEDURES | Age: 60
Discharge: HOME | End: 2020-07-28
Attending: INTERNAL MEDICINE | Admitting: INTERNAL MEDICINE
Payer: COMMERCIAL

## 2020-07-28 VITALS
SYSTOLIC BLOOD PRESSURE: 112 MMHG | WEIGHT: 202 LBS | TEMPERATURE: 98 F | OXYGEN SATURATION: 99 % | HEART RATE: 71 BPM | BODY MASS INDEX: 27.36 KG/M2 | DIASTOLIC BLOOD PRESSURE: 73 MMHG | HEIGHT: 72 IN

## 2020-07-28 LAB
ANION GAP SERPL CALCULATED.3IONS-SCNC: 13 MMOL/L (ref 3–16)
BUN BLDV-MCNC: 32 MG/DL (ref 7–20)
CALCIUM SERPL-MCNC: 8.8 MG/DL (ref 8.3–10.6)
CHLORIDE BLD-SCNC: 101 MMOL/L (ref 99–110)
CO2: 26 MMOL/L (ref 21–32)
CREAT SERPL-MCNC: 1.5 MG/DL (ref 0.8–1.3)
GFR AFRICAN AMERICAN: 57
GFR NON-AFRICAN AMERICAN: 47
GLUCOSE BLD-MCNC: 118 MG/DL (ref 70–99)
HCT VFR BLD CALC: 42.1 % (ref 40.5–52.5)
HEMOGLOBIN: 14 G/DL (ref 13.5–17.5)
MCH RBC QN AUTO: 31 PG (ref 26–34)
MCHC RBC AUTO-ENTMCNC: 33.3 G/DL (ref 31–36)
MCV RBC AUTO: 93.1 FL (ref 80–100)
PDW BLD-RTO: 14.2 % (ref 12.4–15.4)
PLATELET # BLD: 196 K/UL (ref 135–450)
PMV BLD AUTO: 9.1 FL (ref 5–10.5)
POTASSIUM SERPL-SCNC: 3.6 MMOL/L (ref 3.5–5.1)
RBC # BLD: 4.52 M/UL (ref 4.2–5.9)
SODIUM BLD-SCNC: 140 MMOL/L (ref 136–145)
WBC # BLD: 8.6 K/UL (ref 4–11)

## 2020-07-28 PROCEDURE — 2780000010 HC IMPLANT OTHER

## 2020-07-28 PROCEDURE — 6360000002 HC RX W HCPCS

## 2020-07-28 PROCEDURE — 80048 BASIC METABOLIC PNL TOTAL CA: CPT

## 2020-07-28 PROCEDURE — 33263 RMVL & RPLCMT DFB GEN 2 LEAD: CPT | Performed by: INTERNAL MEDICINE

## 2020-07-28 PROCEDURE — 2500000003 HC RX 250 WO HCPCS

## 2020-07-28 PROCEDURE — 93010 ELECTROCARDIOGRAM REPORT: CPT | Performed by: INTERNAL MEDICINE

## 2020-07-28 PROCEDURE — 99153 MOD SED SAME PHYS/QHP EA: CPT

## 2020-07-28 PROCEDURE — 33263 RMVL & RPLCMT DFB GEN 2 LEAD: CPT

## 2020-07-28 PROCEDURE — 85027 COMPLETE CBC AUTOMATED: CPT

## 2020-07-28 PROCEDURE — 93005 ELECTROCARDIOGRAM TRACING: CPT | Performed by: INTERNAL MEDICINE

## 2020-07-28 PROCEDURE — 36415 COLL VENOUS BLD VENIPUNCTURE: CPT

## 2020-07-28 PROCEDURE — 99152 MOD SED SAME PHYS/QHP 5/>YRS: CPT

## 2020-07-28 PROCEDURE — C1721 AICD, DUAL CHAMBER: HCPCS

## 2020-07-28 PROCEDURE — 2580000003 HC RX 258

## 2020-07-28 NOTE — PROCEDURES
[address]     Electrophysiology Procedure Note       Date of Procedure: 7/28/2020  Patient's Name: Ninoska Dunbar  YOB: 1960   Medical Record Number: [id number]  Referring Physician: Lauren Carias MD  Procedure Performed by: Lauren Carias MD    Procedures performed:     ·    · Replacement of a MRI compatible  dual  chamber  ICD  · IV sedation. · Programming and analysis of the device      Indication of the procedure:     Ninoska Dunbar is a 62 y.o. male with cardiomyopathy, ICD at [facility]        Details of procedure: The patient was brought to the electrophysiology laboratory in stable condition. The patient was in a fasting and non-sedated state. The risks, benefits and alternatives of the procedure were discussed with the patient  . The risks including, but not limited to, the risks of vascular injury, bleeding, infection, device malfunction, lead dislodgement, radiation exposure, injury to cardiac and surrounding structures (including pneumothorax), stroke, myocardial infarction and death were discussed in detail. Patient opted to proceed with the device implantation. Written informed consent was signed and placed in the chart. Prophylactic antibiotic was given. An independent trained observer pushed medications at my direction. We monitored the patient's level of consciousness and vital signs/physiologic status throughout the procedure duration (see start and stop times below). Sedation:  2 mg Versed, 100 mcg Fentanyl  Sedation start: 0800  Sedation stop: 0845      The patient was prepped and draped in a sterile fashion. A timeout protocol was completed to identify the patient and the procedure being performed. Pre-sedation evaluation and airway assessment (Mallampatti classification, class llI) was completed. IV sedation was provided with IV Versed, Fentanyl. An incision was made in the left pectoral area after administration of lidocaine.  Using electrocautery and blunt dissection,  pocket was opened. Device was replaced. The pocket was irrigated with saline solution. The pulse generator was sutured inside the pocket. The pocket was then closed in three separate subcutaneous layers using  3-0 Vicryl and subcuticular layer using 4-0 Vicryl . The skin was covered with pressure dressing. Steristrips was used on the skin. Antibiotic envelope was used. All sponge and needle counts were reported as correct at the end of the procedure. The patient tolerated the procedure well and there were no complications. Post-sedation evaluation was completed. Patient was transported to the holding area in stable condition.      Blood HFLL<74 cc  No complication  Lead and device information:         Plan:     The patient will   have usual post-implant care,

## 2020-07-28 NOTE — H&P
[address]   Electrophysiology         Chief Complaint   Patient presents with    Follow-up     Discuss GEN change    Cardiomyopathy        HPI: Sven Murry is a 62 y.o. pmh significant for CAD, cardiomyopathy s/p ICD per ,hypercholesterolemia, meth and cocaine abuse and non compliance with medicine. The patient was found down at home by his step dad on the floor with right sided paralysis, unknown the timing of when he was last at his baseline, was not candidate for TPA.  His CT was consistent with CVA    His device is at [facility]. No cardiac complaints  Has burning sensation on Rt side of body since CVA    Past Medical History:   Diagnosis Date    CAD (coronary artery disease)     MI CABG x5    Chronic kidney disease     Collapsed lung     Bilat    GERD (gastroesophageal reflux disease)     Hematuria     Hyperlipidemia     Hypertension     ICD (implantable cardioverter-defibrillator) in place     Mural thrombus of left ventricle     Tortuous aorta (HCC)         Past Surgical History:   Procedure Laterality Date    ARM DEBRIDEMENT  10-26-10    incision and drainage bilateral arms    CARDIAC SURGERY      three stents    CHOLECYSTECTOMY, OPEN      COLON SURGERY      COLONOSCOPY  07/18/2016    COLONOSCOPY  11/20/2019    COLONOSCOPY POLYPECTOMY SNARE/COLD BIOPSY performed by Markie Gonzalez MD at [address] [place] [address] Drive Left 2013    defibrillator    UPPER GASTROINTESTINAL ENDOSCOPY  05/01/2017    esophageal erosions, esophageal dilation, biopsy    UPPER GASTROINTESTINAL ENDOSCOPY  06/27/2017    dilated       Allergies:  No Known Allergies    Social History:   reports that he has been smoking cigarettes. He has a 15.00 pack-year smoking history. He has never used smokeless tobacco. He reports current drug use. Drug: Cocaine. He reports that he does not drink alcohol.      Family History:  family history includes Alcohol Abuse in his father; Asthma in his mother; Diabetes in his brother; Heart Attack in his paternal uncle. Reviewed. Denies family history of sudden cardiac death, arrhythmia, premature CAD    Review of System:  All other systems reviewed and are negative except for that noted above. Pertinent negatives are:   General: negative for fever, chills   Ophthalmic ROS: negative for - eye pain or loss of vision  ENT ROS: negative for - headaches, sore throat   Respiratory: negative for - cough, sputum  Cardiovascular: Reviewed in HPI  Gastrointestinal: negative for - abdominal pain, diarrhea, N/V  Hematology: negative for - bleeding, blood clots, bruising or jaundice  Genito-Urinary:  negative for - Dysuria or incontinence  Musculoskeletal: negative for - Joint swelling, muscle pain  Neurological: negative for - confusion, dizziness, headaches   Psychiatric: No anxiety, no depression. Dermatological: negative for - rash    Physical Examination:  Vitals:    07/09/20 1558   BP: 105/74   Pulse: 85   Resp: 20      Wt Readings from Last 3 Encounters:   07/09/20 202 lb (91.6 kg)   02/27/20 205 lb (93 kg)   01/22/20 201 lb 8 oz (91.4 kg)       · Constitutional: Oriented. No distress. · Head: Normocephalic and atraumatic. · Mouth/Throat: Oropharynx is clear and moist.   · Eyes: Conjunctivae normal. EOM are normal.   · Neck: Neck supple. No rigidity. No JVD present. · Cardiovascular: Normal rate, regular rhythm, S1&S2. · Pulmonary/Chest: Bilateral respiratory sounds. No wheezes, No rhonchi. · Abdominal: Soft. Bowel sounds present. No distension, No tenderness. · Musculoskeletal: No tenderness. No edema    · Lymphadenopathy: Has no cervical adenopathy. · Neurological: Alert and oriented. Cranial nerve appears intact, Rt side  deficit   · Skin: Skin is warm and dry. No rash noted. · Psychiatric: Has a normal behavior       Labs, diagnostic and imaging results reviewed. Reviewed.    Lab Results   Component Value Date    TSH 0.69 05/12/2020    TSH 1.00 10/14/2019    CREATININE 1.2 2020    CREATININE 1.0 2020    AST 16 2020    ALT 14 2020       EC20  Sinus rhythm    Echo: 2020    Summary   -Left ventricular cavity size is mildly dilated. -There is mild asymetric left ventricular hypertrophy.   -Overall left ventricular systolic function appears moderately reduced with   an ejection fraction in the 35% range.   -There is severe hypokinesis of the basal and inferoseptal walls. -Grade I diastolic dysfunction with normal LV filling pressures. E/e\"=9.2.   -Trivial mitral regurgitation.   -Aortic valve appears sclerotic but opens adequately.   -The left atrium is mildly dilated.   -A bubble study was performed and fails to show evidence of shunting.   -Pacer / ICD wire is visualized in the right heart. Stress: 18      Summary    There are moderate-large fixed anterior and fixed inferior lateral defects    of severe intensity c/w scar.    There is no ischemia.    There is severe LV dysfunction with EF=28%.    This is similar to study in 2013.        Recommendation    Discussed with pt.   [facility] will f/u with primary cardiologist to review. Medication:  Current Outpatient Medications   Medication Sig Dispense Refill    escitalopram (LEXAPRO) 10 MG tablet TK 1 T PO QD      ENTRESTO 49-51 MG per tablet TK 1 T PO BID      traZODone (DESYREL) 50 MG tablet Take 50 mg by mouth nightly      carvedilol (COREG) 3.125 MG tablet Take 1 tablet by mouth 2 times daily 180 tablet 3    gabapentin (NEURONTIN) 300 MG capsule Take 300 mg by mouth 2 times daily.  pantoprazole (PROTONIX) 40 MG tablet TAKE 1 TABLET BY MOUTH DAILY 60 tablet 0    ASPIRIN LOW DOSE 81 MG EC tablet TAKE 1 TABLET BY MOUTH DAILY 30 tablet 0    atorvastatin (LIPITOR) 80 MG tablet Take 1 tablet by mouth daily 90 tablet 3     No current facility-administered medications for this visit.         Assessment and plan:   Cardiomyopathy, EF 35%  On meds  S/p ICD  The CIED was interrogated and programmed and I supervised and reviewed all the data. All findings and changes are in device interrogation sheat and reflect my personal interpretation and changes and is scanned to Epic. LASHONDA reached,  Gen Change    CVA  Right sided weakness  Complaints of nerve pain on the right side    HTN  Vitals:    07/09/20 1558   BP: 105/74   Pulse: 85   Resp: 20     -Home BP monitoring encourage with a BP goal <130/80    Coronary artery disease/ cardiomyopathy     S/p CABG, AICD 4/13 11/16 4/13 CX stent patent, D1 100%, LIMA- OM patient but occluded to LAD, ramus severe, RCA 40-60%  Cx stent patent, D1 100%, LIMA-OM patent but occluded to LAD, ramus severe     Last Myoview stress test: 7/224/18: EF 28%, showed no ischemia     Last ECHO: 1/3/2020: EF 35%    Implantable cardioverter defibrillator(ICD)    The CIED was interrogated and programmed and I supervised and reviewed all the data. All findings and changes are in device interrogation sheat and reflect my personal interpretation and changes and is scanned to Epic.      Plan  ICD replacement

## 2020-07-29 LAB
EKG ATRIAL RATE: 71 BPM
EKG DIAGNOSIS: NORMAL
EKG P AXIS: 27 DEGREES
EKG P-R INTERVAL: 128 MS
EKG Q-T INTERVAL: 394 MS
EKG QRS DURATION: 106 MS
EKG QTC CALCULATION (BAZETT): 428 MS
EKG R AXIS: -20 DEGREES
EKG T AXIS: 145 DEGREES
EKG VENTRICULAR RATE: 71 BPM

## 2020-08-04 ENCOUNTER — NURSE ONLY (OUTPATIENT)
Dept: CARDIOLOGY CLINIC | Age: 60
End: 2020-08-04
Payer: COMMERCIAL

## 2020-08-04 PROCEDURE — 93283 PRGRMG EVAL IMPLANTABLE DFB: CPT | Performed by: INTERNAL MEDICINE

## 2020-08-04 NOTE — PROGRESS NOTES
Patient comes in for programming evaluation for his defibrillator. All sensing and pacing parameters are within normal range. Battery life 10.8 years. AP 6%,  <0.1%. No episodes noted. Today we lower the output in both chambers to preserve battery life. Incision is healing nicely. Please see interrogation for more detail. Patient will follow up in 3 months in office or remotely.

## 2020-08-31 ENCOUNTER — TELEPHONE (OUTPATIENT)
Dept: CARDIOLOGY CLINIC | Age: 60
End: 2020-08-31

## 2020-08-31 NOTE — TELEPHONE ENCOUNTER
Pt father calling they just got off the phone with [employer] they helped them transmit his device. They want to know if we received it?  Pls call to advise Thank you

## 2020-10-05 PROBLEM — R07.9 CHEST PAIN: Status: RESOLVED | Noted: 2017-04-29 | Resolved: 2020-10-05

## 2020-10-06 NOTE — PROGRESS NOTES
[address]   Electrophysiology  RAFAEL Dinh-CNP  Attending EP: Dr. Sheree Schilder    Date: 10/28/2020  I had the privilege of visiting Wil Hester in the office. Chief Complaint:   Chief Complaint   Patient presents with    Coronary Artery Disease     History of Present Illness: History obtained from patient and medical record. Wil Hester is 62 y.o. male with a past medical history of cardiomyopathy s/p AICD, CAD, HLD, CVA, and drug abuse.     -Interval history: Today, Wil Hester is being seen for follow up. His dad is present for the visit. He is in sinus rhythm on EKG today. His device interrogation shows normal function. AP 1.3%,  <0.1%. No arrhythmias noted. His blood pressure is stable, 108/68. He does not routinely monitor his BP at home. His optivol is elevated. He reports he takes a water pill daily, but none are listed in the chart. They will check when they get home on the name. He has noticed his right foot has been swollen with mild SOB with activity in the last month. He does admit to drinking too much mountain dew at home. He continues to smoke cigarettes daily. Denies having chest pain, palpitations, shortness of breath, orthopnea/PND, cough, or dizziness at the time of this visit. With regard to medication therapy the patient has been compliant with prescribed regimen. They have tolerated therapy to date. Allergies:  No Known Allergies    Home Medications:  Prior to Visit Medications    Medication Sig Taking? Authorizing Provider   escitalopram (LEXAPRO) 10 MG tablet TK 1 T PO QD Yes Historical Provider, MD   ENTRESTO 49-51 MG per tablet TK 1 T PO BID Yes Historical Provider, MD   traZODone (DESYREL) 50 MG tablet Take 50 mg by mouth nightly Yes Historical Provider, MD   carvedilol (COREG) 3.125 MG tablet Take 1 tablet by mouth 2 times daily Yes Lazarus Griffon, APRN - CNP   gabapentin (NEURONTIN) 300 MG capsule Take 300 mg by mouth 2 times daily.  Yes Historical Provider, MD   pantoprazole (PROTONIX) 40 MG tablet TAKE 1 TABLET BY MOUTH DAILY Yes Pattie Pike MD   ASPIRIN LOW DOSE 81 MG EC tablet TAKE 1 TABLET BY MOUTH DAILY Yes RAFAEL Hays CNP   atorvastatin (LIPITOR) 80 MG tablet Take 1 tablet by mouth daily Yes RAFAEL Hays - CNP      Past Medical History:  Past Medical History:   Diagnosis Date    CAD (coronary artery disease)     MI CABG x5    Chronic kidney disease     Collapsed lung     Bilat    GERD (gastroesophageal reflux disease)     Hematuria     Hyperlipidemia     Hypertension     ICD (implantable cardioverter-defibrillator) in place     Mural thrombus of left ventricle     Tortuous aorta (HCC)      Past Surgical History:    has a past surgical history that includes Colon surgery; Cholecystectomy, open; Arm Debridement (10-26-10); Cardiac surgery; Colonoscopy (07/18/2016); pacemaker placement (Left, 2013); Upper gastrointestinal endoscopy (05/01/2017); Upper gastrointestinal endoscopy (06/27/2017); and Colonoscopy (11/20/2019). Social History:  Reviewed. reports that he has been smoking cigarettes. He has a 15.00 pack-year smoking history. He has never used smokeless tobacco. He reports current drug use. Drug: Cocaine. He reports that he does not drink alcohol. Family History:  Reviewed. family history includes Alcohol Abuse in his father; Asthma in his mother; Diabetes in his brother; Heart Attack in his paternal uncle.      Review of System:  · Constitutional: Negative for fever, night sweats, chills, weight changes, or weakness  · Skin: Negative for rash, dry skin, pruritus, bruising, bleeding, blood clots, or changes in skin pigment  · HEENT: Negative for vision changes, ringing in the ears, sore throat, dysphagia, or swollen lymph nodes  · Respiratory: Reviewed in HPI  · Cardiovascular: Reviewed in HPI  · Gastrointestinal: Negative for abdominal pain, N/V/D, constipation, or black/tarry K 3.6 07/28/2020     07/28/2020    CO2 26 07/28/2020     Estimated Creatinine Clearance: 57 mL/min (A) (based on SCr of 1.5 mg/dL (H)). Thyroid:   Lab Results   Component Value Date    TSH 0.69 05/12/2020     Lipid Panel:   Lab Results   Component Value Date    CHOL 158 05/12/2020    HDL 42 05/12/2020    HDL 45 02/19/2012    TRIG 86 05/12/2020     LFTs:  Lab Results   Component Value Date    ALT 14 01/03/2020    AST 16 01/03/2020    ALKPHOS 82 01/03/2020    BILITOT 0.7 01/03/2020     Coags:   Lab Results   Component Value Date    PROTIME 12.2 01/30/2020    INR 1.05 01/30/2020    APTT 34.7 07/18/2016       ECG: 10/28/2020  - NSR, rate 76, QTc 386    Echo: 1/20   -Left ventricular cavity size is mildly dilated. -There is mild asymetric left ventricular hypertrophy.   -Overall left ventricular systolic function appears moderately reduced with an ejection fraction in the 35% range.   -There is severe hypokinesis of the basal and inferoseptal walls. -Grade I diastolic dysfunction with normal LV filling pressures. E/e\"=9.2.   -Trivial mitral regurgitation.   -Aortic valve appears sclerotic but opens adequately.   -The left atrium is mildly dilated.   -A bubble study was performed and fails to show evidence of shunting.   -Pacer / ICD wire is visualized in the right heart. GXT: 7/18  There are moderate-large fixed anterior and fixed inferior lateral defects     of severe intensity c/w scar.     There is no ischemia.     There is severe LV dysfunction with EF=28%.     This is similar to study in 2013.           Assessment:    1. Ischemic Cardiomyopathy  - S/p single chamber [employer] AICD (4/13); s/p gen change (7/20)  - I reviewed device interrogation today. Device functioning normally.   ~ A-paced 1.3% V-paced <0.1%  ~ 10.7 years left on battery life expectancy  - Discussed with patient  - Follow up with device clinic as scheduled    2.  Chronic systolic heart failure (NYHA Class II)  - Appears slightly decompensated   ~ EF 35% per echo (1/20)  - Continue with coreg 3.125 mg BID, entresto 49-51 mg BID  - Reports on diuretic ? Will have them call back with name    - Aggressive medical therapy with risk factor modification  - Discussed with patient importance of monitoring weight, low sodium diet and fluid restriction      - Check BMP/BNP today    ~ Will decide on diuretic changes based on labs    3. HTN  - Controlled: Goal <130/80  - Continue current medications  - Encouraged to monitor and log BP readings at home, then bring log to next visit  - Discussed importance of low sodium diet, weight control and exercise    4. CAD  - S/p CABG (4/13)  - Stable  - No complaints of angina  - Continue ASA, ARNI, BB, and statin    5. CVA   - On ASA, statin    6. CKD   - Elevated in July   - Recheck today   - Encourage adequate PO intake    Plan:  1. Continue current medications  2. Call office back with name of water pill  3. Check labs today  4. Reduce cigarette usage    F/U: Follow-up with NPKA in 3 months and EP in 6 months  -Follow up with device clinic as scheduled  -Call [address] at [phone] with any questions    Diet & Exercise:   The patient is counseled to follow a low salt diet to assure blood pressure remains controlled for cardiovascular risk factor modification   The patient is counseled to avoid excess caffeine, and energy drinks as this may exacerbated ectopy and arrhythmia   The patient is counseled to lose weight to control cardiovascular risk factors   Exercise program discussed: To improve overall cardiovascular health, the patient is instructed to increase cardiovascular related activities with a goal of 150 min/week of moderate level activity or 10,000 steps per day. Encouraged to perform as much activity as tolerated    Quality Metrics  1. Tobacco Cessation Counseling: Yes  The risks related to smoking were reviewed with the patient. Recommend maintaining a smoke-free lifestyle. Products available for smoking cessation were discussed. Tobacco cessation counseling completed. 2.   Retake of BP if >140/90: N/A   3. Documentation to PCP: Note sent to PCP office visit  4. CAD patient on anti-platelet: Yes   5. CAD patient on STATIN therapy: Yes   6. Patient with history of CHF and atrial fibrillation on anticoagulation: N/A      I have addressed the patient's cardiac risk factors and adjusted pharmacologic treatment as needed. In addition, I have reinforced the need for patient directed risk factor modification. I independently reviewed the device check interrogation and ECG    All questions and concerns were addressed with the patient. Alternatives to treatment were discussed. Thank you for allowing to us to participate in the care of [address] [place] [address]     Linda Padilla, APRN-CNP  [address]   Office: [phone]

## 2020-10-28 ENCOUNTER — NURSE ONLY (OUTPATIENT)
Dept: CARDIOLOGY CLINIC | Age: 60
End: 2020-10-28
Payer: COMMERCIAL

## 2020-10-28 ENCOUNTER — OFFICE VISIT (OUTPATIENT)
Dept: CARDIOLOGY CLINIC | Age: 60
End: 2020-10-28
Payer: COMMERCIAL

## 2020-10-28 ENCOUNTER — HOSPITAL ENCOUNTER (OUTPATIENT)
Age: 60
Discharge: HOME | End: 2020-10-28
Payer: COMMERCIAL

## 2020-10-28 ENCOUNTER — TELEPHONE (OUTPATIENT)
Dept: CARDIOLOGY CLINIC | Age: 60
End: 2020-10-28

## 2020-10-28 VITALS
OXYGEN SATURATION: 99 % | HEART RATE: 77 BPM | HEIGHT: 72 IN | BODY MASS INDEX: 27.48 KG/M2 | SYSTOLIC BLOOD PRESSURE: 108 MMHG | WEIGHT: 202.9 LBS | DIASTOLIC BLOOD PRESSURE: 68 MMHG

## 2020-10-28 PROBLEM — Z86.73 HISTORY OF CVA IN ADULTHOOD: Status: ACTIVE | Noted: 2020-10-28

## 2020-10-28 PROBLEM — I63.9 ACUTE CEREBROVASCULAR ACCIDENT (CVA) (HCC): Status: RESOLVED | Noted: 2020-01-03 | Resolved: 2020-10-28

## 2020-10-28 PROBLEM — I63.9 ACUTE ISCHEMIC STROKE (HCC): Status: RESOLVED | Noted: 2020-01-09 | Resolved: 2020-10-28

## 2020-10-28 PROBLEM — N18.2 STAGE 2 CHRONIC KIDNEY DISEASE: Status: ACTIVE | Noted: 2020-10-28

## 2020-10-28 LAB
ANION GAP SERPL CALCULATED.3IONS-SCNC: 11 MMOL/L (ref 3–16)
BUN BLDV-MCNC: 15 MG/DL (ref 7–20)
CALCIUM SERPL-MCNC: 8.9 MG/DL (ref 8.3–10.6)
CHLORIDE BLD-SCNC: 102 MMOL/L (ref 99–110)
CO2: 25 MMOL/L (ref 21–32)
CREAT SERPL-MCNC: 1 MG/DL (ref 0.8–1.3)
GFR AFRICAN AMERICAN: >60
GFR NON-AFRICAN AMERICAN: >60
GLUCOSE BLD-MCNC: 88 MG/DL (ref 70–99)
POTASSIUM SERPL-SCNC: 4.2 MMOL/L (ref 3.5–5.1)
PRO-BNP: 220 PG/ML (ref 0–124)
SODIUM BLD-SCNC: 138 MMOL/L (ref 136–145)

## 2020-10-28 PROCEDURE — 83880 ASSAY OF NATRIURETIC PEPTIDE: CPT

## 2020-10-28 PROCEDURE — 80048 BASIC METABOLIC PNL TOTAL CA: CPT

## 2020-10-28 PROCEDURE — 36415 COLL VENOUS BLD VENIPUNCTURE: CPT

## 2020-10-28 PROCEDURE — 99214 OFFICE O/P EST MOD 30 MIN: CPT | Performed by: NURSE PRACTITIONER

## 2020-10-28 PROCEDURE — 93283 PRGRMG EVAL IMPLANTABLE DFB: CPT | Performed by: INTERNAL MEDICINE

## 2020-10-28 PROCEDURE — 93000 ELECTROCARDIOGRAM COMPLETE: CPT | Performed by: NURSE PRACTITIONER

## 2020-10-28 RX ORDER — FUROSEMIDE 40 MG/1
40 TABLET ORAL DAILY
Qty: 90 TABLET | Refills: 1 | Status: SHIPPED
Start: 2020-10-28

## 2020-10-28 NOTE — PROGRESS NOTES
Patient comes in for programming evaluation for his defibrillator. All sensing and pacing parameters are within normal range. Battery life 10.7 years  AP 1.3%.  <0.1%. No episodes noted. Patient remains on Coreg. No changes need to be made at this time. Please see interrogation for more detail. Optivol is elevated. -Possible OptiVol fluid accumulation: 11-Oct-2020 -- ongoing. Patient reports swelling on right side (also the side of his stroke), denies SOB. Patient will see NPSR today and follow up in 3 months in office or remotely.

## 2020-10-28 NOTE — TELEPHONE ENCOUNTER
Pt was in to see NPSR today and was told to call with the name of the medication pt was taking. He is taking furosemide 40 mg once a day.  Pls call to advise Thank you

## 2020-10-28 NOTE — PATIENT INSTRUCTIONS
1. Continue current medications  2. Call office back with name of water pill  3. Check labs today  4.  Reduce cigarette usage

## 2021-02-22 ENCOUNTER — NURSE ONLY (OUTPATIENT)
Dept: CARDIOLOGY CLINIC | Age: 61
End: 2021-02-22
Payer: MEDICARE

## 2021-02-22 DIAGNOSIS — Z95.810 AUTOMATIC IMPLANTABLE CARDIOVERTER-DEFIBRILLATOR IN SITU: ICD-10-CM

## 2021-02-22 DIAGNOSIS — I42.0 DILATED CARDIOMYOPATHY (HCC): ICD-10-CM

## 2021-02-22 PROCEDURE — 93296 REM INTERROG EVL PM/IDS: CPT | Performed by: INTERNAL MEDICINE

## 2021-02-22 PROCEDURE — 93295 DEV INTERROG REMOTE 1/2/MLT: CPT | Performed by: INTERNAL MEDICINE

## 2021-02-22 NOTE — LETTER
[address] [phone]  [address] [facility] [phone]    Pacemaker/Defibrillator Clinic          02/22/21        Denise Combs  [address] [address]        Dear Denise Combs    This letter is to inform you that we received the transmission from your monitor at home that checks your implanted heart device. The next date your monitor will automatically transmit will be 5-25-21. If your report needs attention we will notify you. Your device and monitor are wireless and most transmit cellularly, but please periodically check your monitor is still plugged in to the electrical outlet. If you still use the telephone land line to send please ensure the connection to the phone estela is secure. This will help to ensure successful automatic transmissions in the future. Also, the monitor needs to be close to you while sleeping at night. Please be aware that the remote device transmission sites are periodically monitored only during regular business hours during which simultaneous in-office device clinics are being run. If your transmission requires attention, we will contact you as soon as possible. Thank you.             [address]

## 2021-02-22 NOTE — PROGRESS NOTES
We received remote transmission from patient's monitor at home. Transmission shows normal sensing and pacing function. Noted NSVT. EP physician will review. See interrogation under cardiology tab in the [address] [facility] [address] field for more details. Optivol is within normal range.

## 2021-03-18 ENCOUNTER — IMMUNIZATION (OUTPATIENT)
Dept: PRIMARY CARE CLINIC | Age: 61
End: 2021-03-18
Payer: MEDICARE

## 2021-03-18 PROCEDURE — 91301 COVID-19, MODERNA VACCINE 100MCG/0.5ML DOSE: CPT

## 2021-03-18 PROCEDURE — 0011A COVID-19, MODERNA VACCINE 100MCG/0.5ML DOSE: CPT

## 2021-04-15 ENCOUNTER — IMMUNIZATION (OUTPATIENT)
Dept: PRIMARY CARE CLINIC | Age: 61
End: 2021-04-15
Payer: MEDICARE

## 2021-04-15 PROCEDURE — 0012A COVID-19, MODERNA VACCINE 100MCG/0.5ML DOSE: CPT | Performed by: FAMILY MEDICINE

## 2021-04-15 PROCEDURE — 91301 COVID-19, MODERNA VACCINE 100MCG/0.5ML DOSE: CPT | Performed by: FAMILY MEDICINE

## 2021-04-23 DIAGNOSIS — I25.110 CORONARY ARTERY DISEASE INVOLVING NATIVE HEART WITH UNSTABLE ANGINA PECTORIS, UNSPECIFIED VESSEL OR LESION TYPE (HCC): ICD-10-CM

## 2021-04-23 DIAGNOSIS — E78.00 HYPERCHOLESTEREMIA: ICD-10-CM

## 2021-04-23 DIAGNOSIS — I25.5 ISCHEMIC CARDIOMYOPATHY: ICD-10-CM

## 2021-04-23 DIAGNOSIS — Z72.0 TOBACCO ABUSE: ICD-10-CM

## 2021-04-23 DIAGNOSIS — I10 ESSENTIAL HYPERTENSION: ICD-10-CM

## 2021-04-23 RX ORDER — CARVEDILOL 3.12 MG/1
3.12 TABLET ORAL 2 TIMES DAILY
Qty: 180 TABLET | Refills: 3 | Status: SHIPPED | OUTPATIENT
Start: 2021-04-23

## 2021-04-23 NOTE — TELEPHONE ENCOUNTER
Received refill request for carvedilol from [facility] pharmacy. Last ov:10/28/2020 NPSR    Last Refill: #180 with 3 refills 4/13/2020    Next appointment:no appt scheduled.

## 2021-05-11 ENCOUNTER — HOSPITAL ENCOUNTER (OUTPATIENT)
Dept: NON INVASIVE DIAGNOSTICS | Age: 61
Discharge: HOME | End: 2021-05-11
Payer: MEDICARE

## 2021-05-11 DIAGNOSIS — I50.23 ACUTE ON CHRONIC SYSTOLIC HEART FAILURE (HCC): ICD-10-CM

## 2021-05-11 LAB
LV EF: 28 %
LVEF MODALITY: NORMAL

## 2021-05-11 PROCEDURE — 93306 TTE W/DOPPLER COMPLETE: CPT

## 2021-05-11 PROCEDURE — 93356 MYOCRD STRAIN IMG SPCKL TRCK: CPT

## 2021-05-25 ENCOUNTER — NURSE ONLY (OUTPATIENT)
Dept: CARDIOLOGY CLINIC | Age: 61
End: 2021-05-25
Payer: MEDICARE

## 2021-05-25 DIAGNOSIS — Z95.810 AUTOMATIC IMPLANTABLE CARDIOVERTER-DEFIBRILLATOR IN SITU: ICD-10-CM

## 2021-05-25 DIAGNOSIS — I42.0 DILATED CARDIOMYOPATHY (HCC): ICD-10-CM

## 2021-05-25 PROCEDURE — 93296 REM INTERROG EVL PM/IDS: CPT | Performed by: INTERNAL MEDICINE

## 2021-05-25 PROCEDURE — 93295 DEV INTERROG REMOTE 1/2/MLT: CPT | Performed by: INTERNAL MEDICINE

## 2021-05-25 NOTE — LETTER
[address] [facility] [phone]  [address] [facility] [phone]    Pacemaker/Defibrillator Clinic    05/25/21      Sis Herrera  [address]  [facility] [address]      Dear Sis Herrera    This letter is to inform you that we received the transmission from your monitor at home that checks your implanted heart device. The next date your monitor will automatically transmit will be 8-24-21. If your report needs attention we will notify you. Your device and monitor are wireless and most transmit cellularly, but please periodically check your monitor is still plugged in to the electrical outlet. If you still use the telephone land line to send please ensure the connection to the phone estela is secure. This will help to ensure successful automatic transmissions in the future. Also, the monitor needs to be close to you while sleeping at night. Please be aware that the remote device transmission sites are periodically monitored only during regular business hours during which simultaneous in-office device clinics are being run. If your transmission requires attention, we will contact you as soon as possible. **PLEASE NOTE** that our [facility] policy and processes are changing to ensure a more seamless approach for all parties involved, allowing more time for our nurses to address patient issues and concerns. We will no longer be sending letters for NORMAL remote transmissions. You will be contacted by phone if your transmission requires attention (as previously done), and letters will only be sent regarding monitor disconnections or missed transmissions if you are unable to be reached by phone. Please do not be alarmed by this new process, as we will continue to contact you if your transmission report requires attention. This will be your final \"remote received\" letter.   From this point forward, the [facility] will be utilizing the no news is good news approach. As always, please feel free to contact your nurse with any questions or concerns. Thank you.     [address]

## 2021-07-15 ENCOUNTER — HOSPITAL ENCOUNTER (OUTPATIENT)
Dept: VASCULAR LAB | Age: 61
Discharge: HOME | End: 2021-07-15
Payer: MEDICARE

## 2021-07-15 DIAGNOSIS — R22.31 LOCALIZED SWELLING, MASS AND LUMP, UPPER LIMB, RIGHT: ICD-10-CM

## 2021-07-15 PROCEDURE — 93971 EXTREMITY STUDY: CPT

## 2021-08-24 ENCOUNTER — NURSE ONLY (OUTPATIENT)
Dept: CARDIOLOGY CLINIC | Age: 61
End: 2021-08-24
Payer: MEDICARE

## 2021-08-24 DIAGNOSIS — Z95.810 AUTOMATIC IMPLANTABLE CARDIOVERTER-DEFIBRILLATOR IN SITU: ICD-10-CM

## 2021-08-24 DIAGNOSIS — I42.0 DILATED CARDIOMYOPATHY (HCC): ICD-10-CM

## 2021-08-24 PROCEDURE — 93295 DEV INTERROG REMOTE 1/2/MLT: CPT | Performed by: INTERNAL MEDICINE

## 2021-08-24 PROCEDURE — 93296 REM INTERROG EVL PM/IDS: CPT | Performed by: INTERNAL MEDICINE

## 2021-08-24 NOTE — PROGRESS NOTES
We received remote transmission from patient's monitor at home. Transmission shows normal sensing and pacing function. Noted NSVT. Pt is on Coreg. EP physician will review. See interrogation under cardiology tab in the [address] [place] [address] [address] field for more details. Optivol is within normal range.

## 2021-11-23 ENCOUNTER — NURSE ONLY (OUTPATIENT)
Dept: CARDIOLOGY CLINIC | Age: 61
End: 2021-11-23
Payer: MEDICARE

## 2021-11-23 DIAGNOSIS — Z95.810 AUTOMATIC IMPLANTABLE CARDIOVERTER-DEFIBRILLATOR IN SITU: ICD-10-CM

## 2021-11-23 DIAGNOSIS — I42.0 DILATED CARDIOMYOPATHY (HCC): ICD-10-CM

## 2021-11-23 PROCEDURE — 93295 DEV INTERROG REMOTE 1/2/MLT: CPT | Performed by: INTERNAL MEDICINE

## 2021-11-23 PROCEDURE — 93296 REM INTERROG EVL PM/IDS: CPT | Performed by: INTERNAL MEDICINE

## 2021-11-23 NOTE — PROGRESS NOTES
We received remote transmission from patient's monitor at home. Transmission shows normal sensing and pacing function. Noted NSVT. Pt is on Coreg. EP physician will review. See interrogation under cardiology tab in the [address] [place] [address] [address] field for more details. Optivol is within normal range.

## 2022-01-13 ENCOUNTER — HOSPITAL ENCOUNTER (OUTPATIENT)
Dept: GENERAL RADIOLOGY | Age: 62
Discharge: HOME | End: 2022-01-13
Payer: MEDICARE

## 2022-01-13 ENCOUNTER — HOSPITAL ENCOUNTER (OUTPATIENT)
Age: 62
Discharge: HOME | End: 2022-01-13
Payer: MEDICARE

## 2022-01-13 DIAGNOSIS — M25.511 RIGHT SHOULDER PAIN, UNSPECIFIED CHRONICITY: ICD-10-CM

## 2022-01-13 PROCEDURE — 73030 X-RAY EXAM OF SHOULDER: CPT

## 2022-02-22 ENCOUNTER — NURSE ONLY (OUTPATIENT)
Dept: CARDIOLOGY CLINIC | Age: 62
End: 2022-02-22
Payer: MEDICARE

## 2022-02-22 DIAGNOSIS — Z95.810 AUTOMATIC IMPLANTABLE CARDIOVERTER-DEFIBRILLATOR IN SITU: ICD-10-CM

## 2022-02-22 DIAGNOSIS — I42.0 DILATED CARDIOMYOPATHY (HCC): ICD-10-CM

## 2022-02-22 PROCEDURE — 93295 DEV INTERROG REMOTE 1/2/MLT: CPT | Performed by: INTERNAL MEDICINE

## 2022-02-22 PROCEDURE — 93296 REM INTERROG EVL PM/IDS: CPT | Performed by: INTERNAL MEDICINE

## 2022-02-22 NOTE — PROGRESS NOTES
We received remote transmission from patient's monitor at home. Transmission shows normal sensing and pacing function. Noted NSVT. Pt is on Coreg. EP physician will review. See interrogation under cardiology tab in the [address] [place] [address] [address] field for more details. Optivol is within normal range.

## 2022-04-29 ENCOUNTER — HOSPITAL ENCOUNTER (OUTPATIENT)
Dept: NON INVASIVE DIAGNOSTICS | Age: 62
Discharge: HOME | End: 2022-04-29
Payer: MEDICARE

## 2022-04-29 DIAGNOSIS — R06.02 SHORTNESS OF BREATH: ICD-10-CM

## 2022-04-29 LAB
LV EF: 28 %
LVEF MODALITY: NORMAL

## 2022-04-29 PROCEDURE — 6360000004 HC RX CONTRAST MEDICATION: Performed by: INTERNAL MEDICINE

## 2022-04-29 PROCEDURE — C8929 TTE W OR WO FOL WCON,DOPPLER: HCPCS

## 2022-04-29 RX ADMIN — PERFLUTREN 1.65 MG: 6.52 INJECTION, SUSPENSION INTRAVENOUS at 14:41

## 2022-05-24 ENCOUNTER — NURSE ONLY (OUTPATIENT)
Dept: CARDIOLOGY CLINIC | Age: 62
End: 2022-05-24
Payer: MEDICARE

## 2022-05-24 DIAGNOSIS — I42.0 DILATED CARDIOMYOPATHY (HCC): ICD-10-CM

## 2022-05-24 DIAGNOSIS — Z95.810 AUTOMATIC IMPLANTABLE CARDIOVERTER-DEFIBRILLATOR IN SITU: ICD-10-CM

## 2022-05-25 PROCEDURE — 93295 DEV INTERROG REMOTE 1/2/MLT: CPT | Performed by: INTERNAL MEDICINE

## 2022-05-25 PROCEDURE — 93296 REM INTERROG EVL PM/IDS: CPT | Performed by: INTERNAL MEDICINE

## 2022-05-25 NOTE — PROGRESS NOTES
We received remote transmission from patient's monitor at home. Transmission shows normal sensing and pacing function. Noted AT. EP physician will review. See interrogation under cardiology tab in the [address] [facility] [address] field for more details. Optivol is within normal range.

## 2022-06-06 ENCOUNTER — HOSPITAL ENCOUNTER (OUTPATIENT)
Age: 62
Discharge: HOME | End: 2022-06-06
Payer: MEDICARE

## 2022-06-06 ENCOUNTER — HOSPITAL ENCOUNTER (OUTPATIENT)
Dept: GENERAL RADIOLOGY | Age: 62
Discharge: HOME | End: 2022-06-06
Payer: MEDICARE

## 2022-06-06 DIAGNOSIS — R52 PAIN: ICD-10-CM

## 2022-06-06 PROCEDURE — 72100 X-RAY EXAM L-S SPINE 2/3 VWS: CPT

## 2022-08-23 ENCOUNTER — NURSE ONLY (OUTPATIENT)
Dept: CARDIOLOGY CLINIC | Age: 62
End: 2022-08-23
Payer: MEDICARE

## 2022-08-23 DIAGNOSIS — I42.0 DILATED CARDIOMYOPATHY (HCC): Primary | ICD-10-CM

## 2022-08-23 DIAGNOSIS — Z95.810 AUTOMATIC IMPLANTABLE CARDIOVERTER-DEFIBRILLATOR IN SITU: ICD-10-CM

## 2022-08-23 PROCEDURE — 93296 REM INTERROG EVL PM/IDS: CPT | Performed by: INTERNAL MEDICINE

## 2022-08-23 PROCEDURE — 93295 DEV INTERROG REMOTE 1/2/MLT: CPT | Performed by: INTERNAL MEDICINE

## 2022-08-23 NOTE — PROGRESS NOTES
We received remote transmission from patient's monitor at home. Transmission shows normal sensing and pacing function. Noted NSVT and AT. Pt is on Coreg. EP physician will review. See interrogation under cardiology tab in the [address] [place] [address] [address] field for more details. Total  < 0.1% (MVP On)  AS-VS 99.4%  AS- < 0.1%  AP-VS 0.5%    Optivol is within normal range. End of 91-day monitoring period 8-23-22.

## 2023-12-01 NOTE — PLAN OF CARE
Problem: Falls - Risk of:  Goal: Will remain free from falls  Description  Will remain free from falls  1/12/2020 2006 by Latasha Jacome RN  Outcome: Ongoing  1/12/2020 1133 by Mervat Liu RN  Outcome: Ongoing  Goal: Absence of physical injury  Description  Absence of physical injury  1/12/2020 2006 by Latasha Jacome RN  Outcome: Ongoing  1/12/2020 1133 by Mervat Liu RN  Outcome: Ongoing     Problem: SAFETY  Goal: LTG - Patient will demonstrate safety requirements appropriate to situation/environment  1/12/2020 1133 by Mervat Liu RN  Outcome: Ongoing  Goal: STG - Patient uses call light consistently to request assistance with transfers  1/12/2020 1133 by Mervat Liu RN  Outcome: Ongoing     Problem: IP BOWEL ELIMINATION  Goal: LTG - patient will have regular and routine bowel evacuation  1/12/2020 1133 by Mervat Liu RN  Outcome: Ongoing  Goal: STG - patient will be accident free  1/12/2020 1133 by Mervat Liu RN  Outcome: Ongoing     Problem: IP BLADDER/VOIDING  Goal: LTG - patient will achieve acceptable level of continence  1/12/2020 1133 by Mervat Liu RN  Outcome: Ongoing  Goal: STG - patient will be able to empty bladder  1/12/2020 1133 by Mervat iLu RN  Outcome: Ongoing     Problem: SKIN INTEGRITY  Goal: LTG - Patient will demonstrate appropriate pressure relief techniques  1/12/2020 1133 by Mervat Liu RN  Outcome: Ongoing  Goal: STG - patient will maintain good skin integrity  1/12/2020 1133 by Mervat Liu RN  Outcome: Ongoing     Problem: Pain:  Description  Pain management should include both nonpharmacologic and pharmacologic interventions.   Goal: Pain level will decrease  Description  Pain level will decrease  1/12/2020 1133 by Mervat Liu RN  Outcome: Ongoing  Goal: Control of acute pain  Description  Control of acute pain  1/12/2020 1133 by Mervat Liu RN  Outcome: Ongoing  Goal: Control of chronic pain  Description  Control of chronic no

## (undated) DEVICE — BW-412T DISP COMBO CLEANING BRUSH: Brand: SINGLE USE COMBINATION CLEANING BRUSH

## (undated) DEVICE — SOLUTION IV IRRIG WATER 500ML POUR BRL ST 2F7113

## (undated) DEVICE — SET VLV 3 PC AWS DISPOSABLE GRDIAN SCOPEVALET

## (undated) DEVICE — GOWN AURORA NONREINF LG: Brand: MEDLINE INDUSTRIES, INC.

## (undated) DEVICE — 60 ML SYRINGE,REGULAR TIP: Brand: MONOJECT

## (undated) DEVICE — Device: Brand: DISPOSABLE ELECTROSURGICAL SNARE

## (undated) DEVICE — TRAP SPEC RETRV CLR PLAS POLYP IN LN SUCT QUIK CTCH

## (undated) DEVICE — PROCEDURE KIT ENDOSCP CUST